# Patient Record
Sex: FEMALE | Race: WHITE | NOT HISPANIC OR LATINO | Employment: FULL TIME | URBAN - METROPOLITAN AREA
[De-identification: names, ages, dates, MRNs, and addresses within clinical notes are randomized per-mention and may not be internally consistent; named-entity substitution may affect disease eponyms.]

---

## 2018-03-12 ENCOUNTER — TRANSCRIBE ORDERS (OUTPATIENT)
Dept: ADMINISTRATIVE | Facility: HOSPITAL | Age: 53
End: 2018-03-12

## 2018-03-12 DIAGNOSIS — Z12.31 VISIT FOR SCREENING MAMMOGRAM: Primary | ICD-10-CM

## 2018-05-31 ENCOUNTER — HOSPITAL ENCOUNTER (OUTPATIENT)
Dept: MAMMOGRAPHY | Facility: HOSPITAL | Age: 53
Discharge: HOME | End: 2018-05-31
Attending: OBSTETRICS & GYNECOLOGY | Admitting: OBSTETRICS & GYNECOLOGY

## 2018-05-31 DIAGNOSIS — Z12.31 VISIT FOR SCREENING MAMMOGRAM: ICD-10-CM

## 2018-05-31 PROCEDURE — 77063 BREAST TOMOSYNTHESIS BI: CPT

## 2018-05-31 PROCEDURE — 77067 SCR MAMMO BI INCL CAD: CPT | Performed by: RADIOLOGY

## 2018-05-31 PROCEDURE — 77063 BREAST TOMOSYNTHESIS BI: CPT | Performed by: RADIOLOGY

## 2018-05-31 PROCEDURE — 77067 SCR MAMMO BI INCL CAD: CPT

## 2018-06-12 ENCOUNTER — OFFICE VISIT (OUTPATIENT)
Dept: INTERNAL MEDICINE | Facility: CLINIC | Age: 53
End: 2018-06-12

## 2018-06-12 VITALS
HEIGHT: 67 IN | TEMPERATURE: 98.4 F | OXYGEN SATURATION: 98 % | DIASTOLIC BLOOD PRESSURE: 98 MMHG | HEART RATE: 71 BPM | WEIGHT: 248 LBS | BODY MASS INDEX: 38.92 KG/M2 | SYSTOLIC BLOOD PRESSURE: 152 MMHG

## 2018-06-12 DIAGNOSIS — R03.0 BLOOD PRESSURE ELEVATED WITHOUT HISTORY OF HTN: ICD-10-CM

## 2018-06-12 DIAGNOSIS — E78.1 HIGH BLOOD TRIGLYCERIDES: ICD-10-CM

## 2018-06-12 DIAGNOSIS — Z83.71 FAMILY HISTORY OF POLYPS IN THE COLON: ICD-10-CM

## 2018-06-12 DIAGNOSIS — E55.9 VITAMIN D DEFICIENCY: ICD-10-CM

## 2018-06-12 DIAGNOSIS — Z12.11 SCREENING FOR COLON CANCER: Primary | ICD-10-CM

## 2018-06-12 PROBLEM — Z83.719 FAMILY HISTORY OF POLYPS IN THE COLON: Status: ACTIVE | Noted: 2018-06-12

## 2018-06-12 LAB
25(OH)D3 SERPL-MCNC: 17.1 NG/ML
ALBUMIN SERPL-MCNC: 4.38 G/DL (ref 3.2–4.8)
ALBUMIN/GLOB SERPL: 1.7 G/DL (ref 1.5–2.5)
ALP SERPL-CCNC: 97 U/L (ref 25–100)
ALT SERPL W P-5'-P-CCNC: 23 U/L (ref 7–40)
ANION GAP SERPL CALCULATED.3IONS-SCNC: 7 MMOL/L (ref 3–11)
ARTICHOKE IGE QN: 95 MG/DL (ref 0–130)
AST SERPL-CCNC: 18 U/L (ref 0–33)
BASOPHILS # BLD AUTO: 0.04 10*3/MM3 (ref 0–0.2)
BASOPHILS NFR BLD AUTO: 0.5 % (ref 0–1)
BILIRUB SERPL-MCNC: 0.5 MG/DL (ref 0.3–1.2)
BUN BLD-MCNC: 13 MG/DL (ref 9–23)
BUN/CREAT SERPL: 18.8 (ref 7–25)
CALCIUM SPEC-SCNC: 9.4 MG/DL (ref 8.7–10.4)
CHLORIDE SERPL-SCNC: 103 MMOL/L (ref 99–109)
CHOLEST SERPL-MCNC: 248 MG/DL (ref 0–200)
CO2 SERPL-SCNC: 29 MMOL/L (ref 20–31)
CREAT BLD-MCNC: 0.69 MG/DL (ref 0.6–1.3)
DEPRECATED RDW RBC AUTO: 40.9 FL (ref 37–54)
EOSINOPHIL # BLD AUTO: 0.19 10*3/MM3 (ref 0–0.3)
EOSINOPHIL NFR BLD AUTO: 2.3 % (ref 0–3)
ERYTHROCYTE [DISTWIDTH] IN BLOOD BY AUTOMATED COUNT: 13.5 % (ref 11.3–14.5)
GFR SERPL CREATININE-BSD FRML MDRD: 89 ML/MIN/1.73
GLOBULIN UR ELPH-MCNC: 2.5 GM/DL
GLUCOSE BLD-MCNC: 92 MG/DL (ref 70–100)
HCT VFR BLD AUTO: 43.3 % (ref 34.5–44)
HDLC SERPL-MCNC: 65 MG/DL (ref 40–60)
HGB BLD-MCNC: 14.2 G/DL (ref 11.5–15.5)
IMM GRANULOCYTES # BLD: 0.01 10*3/MM3 (ref 0–0.03)
IMM GRANULOCYTES NFR BLD: 0.1 % (ref 0–0.6)
LYMPHOCYTES # BLD AUTO: 1.85 10*3/MM3 (ref 0.6–4.8)
LYMPHOCYTES NFR BLD AUTO: 22.3 % (ref 24–44)
MCH RBC QN AUTO: 27.5 PG (ref 27–31)
MCHC RBC AUTO-ENTMCNC: 32.8 G/DL (ref 32–36)
MCV RBC AUTO: 83.9 FL (ref 80–99)
MONOCYTES # BLD AUTO: 0.43 10*3/MM3 (ref 0–1)
MONOCYTES NFR BLD AUTO: 5.2 % (ref 0–12)
NEUTROPHILS # BLD AUTO: 5.78 10*3/MM3 (ref 1.5–8.3)
NEUTROPHILS NFR BLD AUTO: 69.7 % (ref 41–71)
PLATELET # BLD AUTO: 211 10*3/MM3 (ref 150–450)
PMV BLD AUTO: 11.2 FL (ref 6–12)
POTASSIUM BLD-SCNC: 4.8 MMOL/L (ref 3.5–5.5)
PROT SERPL-MCNC: 6.9 G/DL (ref 5.7–8.2)
RBC # BLD AUTO: 5.16 10*6/MM3 (ref 3.89–5.14)
SODIUM BLD-SCNC: 139 MMOL/L (ref 132–146)
TRIGL SERPL-MCNC: 219 MG/DL (ref 0–150)
TSH SERPL DL<=0.05 MIU/L-ACNC: 1.74 MIU/ML (ref 0.35–5.35)
WBC NRBC COR # BLD: 8.29 10*3/MM3 (ref 3.5–10.8)

## 2018-06-12 PROCEDURE — 80061 LIPID PANEL: CPT | Performed by: FAMILY MEDICINE

## 2018-06-12 PROCEDURE — 80053 COMPREHEN METABOLIC PANEL: CPT | Performed by: FAMILY MEDICINE

## 2018-06-12 PROCEDURE — 85025 COMPLETE CBC W/AUTO DIFF WBC: CPT | Performed by: FAMILY MEDICINE

## 2018-06-12 PROCEDURE — 82306 VITAMIN D 25 HYDROXY: CPT | Performed by: FAMILY MEDICINE

## 2018-06-12 PROCEDURE — 99213 OFFICE O/P EST LOW 20 MIN: CPT | Performed by: FAMILY MEDICINE

## 2018-06-12 PROCEDURE — 84443 ASSAY THYROID STIM HORMONE: CPT | Performed by: FAMILY MEDICINE

## 2018-06-12 NOTE — PROGRESS NOTES
"Subjective   Tiarra Becerra is a 53 y.o. female.     Chief Complaint   Patient presents with   • Establish Care     pt states she wants to discuss heart palpitations       Visit Vitals  /98   Pulse 71   Temp 98.4 °F (36.9 °C) (Temporal Artery )   Ht 170.2 cm (67\")   Wt 112 kg (248 lb)   LMP 01/02/2018   SpO2 98%   BMI 38.84 kg/m²         Hypertension   This is a new problem. The current episode started today. The problem is unchanged. The problem is uncontrolled. Associated symptoms include headaches, malaise/fatigue and palpitations. Pertinent negatives include no anxiety, blurred vision, chest pain, neck pain, orthopnea, peripheral edema, PND, shortness of breath or sweats. There are no associated agents to hypertension. Risk factors for coronary artery disease include family history, obesity and post-menopausal state. Current antihypertension treatment includes nothing. The current treatment provides no improvement. There is no history of angina, kidney disease, CAD/MI, CVA, heart failure, left ventricular hypertrophy, PVD or retinopathy. There is no history of sleep apnea or a thyroid problem.        The following portions of the patient's history were reviewed and updated as appropriate: allergies, current medications, past family history, past medical history, past social history, past surgical history and problem list.    Past Medical History:   Diagnosis Date   • Family history of polyps in the colon 6/12/2018    father      Past Surgical History:   Procedure Laterality Date   • TUBAL ABDOMINAL LIGATION  2006      Family History   Problem Relation Age of Onset   • Breast cancer Cousin         50's   • Arthritis Mother    • Heart attack Mother    • Cancer Father    • Heart attack Father    • Hypertension Father    • Stroke Father    • Diabetes Sister         type 1   • Diabetes Brother         type 1   • Stroke Paternal Grandmother    • Ovarian cancer Neg Hx       Social History     Social History   • " Marital status:      Spouse name: N/A   • Number of children: N/A   • Years of education: N/A     Occupational History   • Not on file.     Social History Main Topics   • Smoking status: Former Smoker     Packs/day: 0.75     Years: 12.00     Types: Cigarettes     Quit date: 1994   • Smokeless tobacco: Never Used   • Alcohol use No   • Drug use: No   • Sexual activity: Defer     Other Topics Concern   • Not on file     Social History Narrative   • No narrative on file        Review of Systems   Constitutional: Positive for fatigue and malaise/fatigue. Negative for chills, diaphoresis and fever.   HENT: Positive for rhinorrhea and sinus pressure. Negative for ear pain, nosebleeds, postnasal drip, sneezing and sore throat.    Eyes: Negative.  Negative for blurred vision, redness and itching.   Respiratory: Negative.  Negative for cough, shortness of breath and wheezing.    Cardiovascular: Positive for palpitations. Negative for chest pain, orthopnea, leg swelling and PND.   Gastrointestinal: Negative.  Negative for abdominal pain, constipation, diarrhea, nausea and vomiting.   Endocrine: Negative.  Negative for cold intolerance and heat intolerance.   Genitourinary: Negative.  Negative for dysuria, frequency, hematuria and urgency.   Musculoskeletal: Positive for arthralgias (knees, left hip). Negative for back pain and neck pain.   Skin: Negative.  Negative for color change and rash.   Allergic/Immunologic: Positive for environmental allergies.   Neurological: Positive for headaches. Negative for dizziness, syncope and light-headedness.   Hematological: Negative.  Negative for adenopathy. Does not bruise/bleed easily.   Psychiatric/Behavioral: Negative.  Negative for dysphoric mood. The patient is not nervous/anxious.        Objective   Physical Exam   Constitutional: She is oriented to person, place, and time. She appears well-developed.   HENT:   Head: Normocephalic.   Right Ear: External ear normal.   Left  Ear: External ear normal.   Nose: Nose normal.   Eyes: Conjunctivae, EOM and lids are normal. Pupils are equal, round, and reactive to light.   Neck: Trachea normal and normal range of motion. Neck supple. Carotid bruit is not present. No thyroid mass and no thyromegaly present.   Cardiovascular: Normal rate and regular rhythm.    No murmur heard.  Pulmonary/Chest: Effort normal and breath sounds normal. No respiratory distress. She has no decreased breath sounds. She has no wheezes. She has no rhonchi. She has no rales. She exhibits no tenderness.   Abdominal: Soft. Bowel sounds are normal. There is no tenderness.   Musculoskeletal: Normal range of motion.   Neurological: She is alert and oriented to person, place, and time.   Skin: Skin is warm and dry.   Psychiatric: She has a normal mood and affect. Her behavior is normal.   Nursing note and vitals reviewed.      Assessment/Plan   Tiarra was seen today for establish care.    Diagnoses and all orders for this visit:    Screening for colon cancer  -     Ambulatory Referral For Screening Colonoscopy    Family history of polyps in the colon  -     Ambulatory Referral For Screening Colonoscopy    High blood triglycerides  -     Lipid Panel    Blood pressure elevated without history of HTN  -     Comprehensive Metabolic Panel  -     Lipid Panel  -     CBC & Differential  -     TSH    Vitamin D deficiency  -     Vitamin D 25 Hydroxy            Handout Dash diet           Current Outpatient Prescriptions:   •  cetirizine (ZyrTEC) 10 MG tablet, Take 10 mg by mouth daily., Disp: , Rfl:   •  Cholecalciferol (VITAMIN D-3) 1000 UNITS capsule, Take 1,000 Units by mouth daily., Disp: , Rfl:   •  estradiol (ESTRACE) 0.5 MG tablet, Take 0.5 mg by mouth daily., Disp: , Rfl:   •  progesterone (PROMETRIUM) 200 MG capsule, Take 200 mg by mouth daily., Disp: , Rfl:     Return in about 4 weeks (around 7/10/2018), or if symptoms worsen or fail to improve, for Recheck BP with nurse 1-2  weeks.

## 2018-06-12 NOTE — PATIENT INSTRUCTIONS
Exercising to Stay Healthy  Exercising regularly is important. It has many health benefits, such as:  · Improving your overall fitness, flexibility, and endurance.  · Increasing your bone density.  · Helping with weight control.  · Decreasing your body fat.  · Increasing your muscle strength.  · Reducing stress and tension.  · Improving your overall health.  In order to become healthy and stay healthy, it is recommended that you do moderate-intensity and vigorous-intensity exercise. You can tell that you are exercising at a moderate intensity if you have a higher heart rate and faster breathing, but you are still able to hold a conversation. You can tell that you are exercising at a vigorous intensity if you are breathing much harder and faster and cannot hold a conversation while exercising.  How often should I exercise?  Choose an activity that you enjoy and set realistic goals. Your health care provider can help you to make an activity plan that works for you. Exercise regularly as directed by your health care provider. This may include:  · Doing resistance training twice each week, such as:  ¨ Push-ups.  ¨ Sit-ups.  ¨ Lifting weights.  ¨ Using resistance bands.  · Doing a given intensity of exercise for a given amount of time. Choose from these options:  ¨ 150 minutes of moderate-intensity exercise every week.  ¨ 75 minutes of vigorous-intensity exercise every week.  ¨ A mix of moderate-intensity and vigorous-intensity exercise every week.  Children, pregnant women, people who are out of shape, people who are overweight, and older adults may need to consult a health care provider for individual recommendations. If you have any sort of medical condition, be sure to consult your health care provider before starting a new exercise program.  What are some exercise ideas?  Some moderate-intensity exercise ideas include:  · Walking at a rate of 1 mile in 15 minutes.  · Biking.  · Hiking.  · Golfing.  · Dancing.  Some  "vigorous-intensity exercise ideas include:  · Walking at a rate of at least 4.5 miles per hour.  · Jogging or running at a rate of 5 miles per hour.  · Biking at a rate of at least 10 miles per hour.  · Lap swimming.  · Roller-skating or in-line skating.  · Cross-country skiing.  · Vigorous competitive sports, such as football, basketball, and soccer.  · Jumping rope.  · Aerobic dancing.  What are some everyday activities that can help me to get exercise?  · Yard work, such as:  ¨ Pushing a .  ¨ Raking and bagging leaves.  · Washing and waxing your car.  · Pushing a stroller.  · Shoveling snow.  · Gardening.  · Washing windows or floors.  How can I be more active in my day-to-day activities?  · Use the stairs instead of the elevator.  · Take a walk during your lunch break.  · If you drive, park your car farther away from work or school.  · If you take public transportation, get off one stop early and walk the rest of the way.  · Make all of your phone calls while standing up and walking around.  · Get up, stretch, and walk around every 30 minutes throughout the day.  What guidelines should I follow while exercising?  · Do not exercise so much that you hurt yourself, feel dizzy, or get very short of breath.  · Consult your health care provider before starting a new exercise program.  · Wear comfortable clothes and shoes with good support.  · Drink plenty of water while you exercise to prevent dehydration or heat stroke. Body water is lost during exercise and must be replaced.  · Work out until you breathe faster and your heart beats faster.  This information is not intended to replace advice given to you by your health care provider. Make sure you discuss any questions you have with your health care provider.  Document Released: 01/20/2012 Document Revised: 05/25/2017 Document Reviewed: 05/21/2015  [employer] Interactive Patient Education © 2017 [employer]  DASH Eating Plan  DASH stands for \"Dietary " "Approaches to Stop Hypertension.\" The DASH eating plan is a healthy eating plan that has been shown to reduce high blood pressure (hypertension). It may also reduce your risk for type 2 diabetes, heart disease, and stroke. The DASH eating plan may also help with weight loss.  What are tips for following this plan?  General guidelines   · Avoid eating more than 2,300 mg (milligrams) of salt (sodium) a day. If you have hypertension, you may need to reduce your sodium intake to 1,500 mg a day.  · Limit alcohol intake to no more than 1 drink a day for nonpregnant women and 2 drinks a day for men. One drink equals 12 oz of beer, 5 oz of wine, or 1½ oz of hard liquor.  · Work with your health care provider to maintain a healthy body weight or to lose weight. Ask what an ideal weight is for you.  · Get at least 30 minutes of exercise that causes your heart to beat faster (aerobic exercise) most days of the week. Activities may include walking, swimming, or biking.  · Work with your health care provider or diet and nutrition specialist (dietitian) to adjust your eating plan to your individual calorie needs.  Reading food labels   · Check food labels for the amount of sodium per serving. Choose foods with less than 5 percent of the Daily Value of sodium. Generally, foods with less than 300 mg of sodium per serving fit into this eating plan.  · To find whole grains, look for the word \"whole\" as the first word in the ingredient list.  Shopping   · Buy products labeled as \"low-sodium\" or \"no salt added.\"  · Buy fresh foods. Avoid canned foods and premade or frozen meals.  Cooking   · Avoid adding salt when cooking. Use salt-free seasonings or herbs instead of table salt or sea salt. Check with your health care provider or pharmacist before using salt substitutes.  · Do not hazel foods. Cook foods using healthy methods such as baking, boiling, grilling, and broiling instead.  · Cook with heart-healthy oils, such as olive, canola, " soybean, or sunflower oil.  Meal planning     · Eat a balanced diet that includes:  ¨ 5 or more servings of fruits and vegetables each day. At each meal, try to fill half of your plate with fruits and vegetables.  ¨ Up to 6-8 servings of whole grains each day.  ¨ Less than 6 oz of lean meat, poultry, or fish each day. A 3-oz serving of meat is about the same size as a deck of cards. One egg equals 1 oz.  ¨ 2 servings of low-fat dairy each day.  ¨ A serving of nuts, seeds, or beans 5 times each week.  ¨ Heart-healthy fats. Healthy fats called Omega-3 fatty acids are found in foods such as flaxseeds and coldwater fish, like sardines, salmon, and mackerel.  · Limit how much you eat of the following:  ¨ Canned or prepackaged foods.  ¨ Food that is high in trans fat, such as fried foods.  ¨ Food that is high in saturated fat, such as fatty meat.  ¨ Sweets, desserts, sugary drinks, and other foods with added sugar.  ¨ Full-fat dairy products.  · Do not salt foods before eating.  · Try to eat at least 2 vegetarian meals each week.  · Eat more home-cooked food and less restaurant, buffet, and fast food.  · When eating at a restaurant, ask that your food be prepared with less salt or no salt, if possible.  What foods are recommended?  The items listed may not be a complete list. Talk with your dietitian about what dietary choices are best for you.  Grains   Whole-grain or whole-wheat bread. Whole-grain or whole-wheat pasta. Brown rice. Oatmeal. Quinoa. Bulgur. Whole-grain and low-sodium cereals. Promise bread. Low-fat, low-sodium crackers. Whole-wheat flour tortillas.  Vegetables   Fresh or frozen vegetables (raw, steamed, roasted, or grilled). Low-sodium or reduced-sodium tomato and vegetable juice. Low-sodium or reduced-sodium tomato sauce and tomato paste. Low-sodium or reduced-sodium canned vegetables.  Fruits   All fresh, dried, or frozen fruit. Canned fruit in natural juice (without added sugar).  Meat and other protein  foods   Skinless chicken or turkey. Ground chicken or turkey. Pork with fat trimmed off. Fish and seafood. Egg whites. Dried beans, peas, or lentils. Unsalted nuts, nut butters, and seeds. Unsalted canned beans. Lean cuts of beef with fat trimmed off. Low-sodium, lean deli meat.  Dairy   Low-fat (1%) or fat-free (skim) milk. Fat-free, low-fat, or reduced-fat cheeses. Nonfat, low-sodium ricotta or cottage cheese. Low-fat or nonfat yogurt. Low-fat, low-sodium cheese.  Fats and oils   Soft margarine without trans fats. Vegetable oil. Low-fat, reduced-fat, or light mayonnaise and salad dressings (reduced-sodium). Canola, safflower, olive, soybean, and sunflower oils. Avocado.  Seasoning and other foods   Herbs. Spices. Seasoning mixes without salt. Unsalted popcorn and pretzels. Fat-free sweets.  What foods are not recommended?  The items listed may not be a complete list. Talk with your dietitian about what dietary choices are best for you.  Grains   Baked goods made with fat, such as croissants, muffins, or some breads. Dry pasta or rice meal packs.  Vegetables   Creamed or fried vegetables. Vegetables in a cheese sauce. Regular canned vegetables (not low-sodium or reduced-sodium). Regular canned tomato sauce and paste (not low-sodium or reduced-sodium). Regular tomato and vegetable juice (not low-sodium or reduced-sodium). Pickles. Olives.  Fruits   Canned fruit in a light or heavy syrup. Fried fruit. Fruit in cream or butter sauce.  Meat and other protein foods   Fatty cuts of meat. Ribs. Fried meat. Solis. Sausage. Bologna and other processed lunch meats. Salami. Fatback. Hotdogs. Bratwurst. Salted nuts and seeds. Canned beans with added salt. Canned or smoked fish. Whole eggs or egg yolks. Chicken or turkey with skin.  Dairy   Whole or 2% milk, cream, and half-and-half. Whole or full-fat cream cheese. Whole-fat or sweetened yogurt. Full-fat cheese. Nondairy creamers. Whipped toppings. Processed cheese and cheese  spreads.  Fats and oils   Butter. Stick margarine. Lard. Shortening. Ghee. Solis fat. Tropical oils, such as coconut, palm kernel, or palm oil.  Seasoning and other foods   Salted popcorn and pretzels. Onion salt, garlic salt, seasoned salt, table salt, and sea salt. [facility] sauce. Tartar sauce. Barbecue sauce. Teriyaki sauce. Soy sauce, including reduced-sodium. Steak sauce. Canned and packaged gravies. Fish sauce. Oyster sauce. Cocktail sauce. Horseradish that you find on the shelf. Ketchup. Mustard. Meat flavorings and tenderizers. Bouillon cubes. Hot sauce and Tabasco sauce. Premade or packaged marinades. Premade or packaged taco seasonings. Relishes. Regular salad dressings.  Where to find more information:  · National Heart, Lung, and Blood [place]: www.nhlbi.nih.gov  · American Heart Association: www.heart.org  Summary  · The DASH eating plan is a healthy eating plan that has been shown to reduce high blood pressure (hypertension). It may also reduce your risk for type 2 diabetes, heart disease, and stroke.  · With the DASH eating plan, you should limit salt (sodium) intake to 2,300 mg a day. If you have hypertension, you may need to reduce your sodium intake to 1,500 mg a day.  · When on the DASH eating plan, aim to eat more fresh fruits and vegetables, whole grains, lean proteins, low-fat dairy, and heart-healthy fats.  · Work with your health care provider or diet and nutrition specialist (dietitian) to adjust your eating plan to your individual calorie needs.  This information is not intended to replace advice given to you by your health care provider. Make sure you discuss any questions you have with your health care provider.  Document Released: 12/06/2012 Document Revised: 12/11/2017 Document Reviewed: 12/11/2017  [employer] Interactive Patient Education © 2017 [employer]

## 2018-06-26 ENCOUNTER — CLINICAL SUPPORT (OUTPATIENT)
Dept: INTERNAL MEDICINE | Facility: CLINIC | Age: 53
End: 2018-06-26

## 2018-06-26 VITALS — SYSTOLIC BLOOD PRESSURE: 140 MMHG | DIASTOLIC BLOOD PRESSURE: 96 MMHG

## 2018-06-27 ENCOUNTER — TELEPHONE (OUTPATIENT)
Dept: INTERNAL MEDICINE | Facility: CLINIC | Age: 53
End: 2018-06-27

## 2018-06-27 RX ORDER — LISINOPRIL 10 MG/1
10 TABLET ORAL DAILY
Qty: 30 TABLET | Refills: 1 | Status: SHIPPED | OUTPATIENT
Start: 2018-06-27 | End: 2018-08-06 | Stop reason: SDUPTHER

## 2018-06-27 NOTE — TELEPHONE ENCOUNTER
----- Message from Avani Mixon MD sent at 6/27/2018  8:06 AM EDT -----  Since it has stayed high, I will send in [id number] 1 a day to start. It looks like she has a f/u in July w/ Dr Cope too  ----- Message -----  From: Rakel Khan MA  Sent: 6/26/2018   1:29 PM  To: Avani Mixon MD    Pt of Dr Cope's present today for a BP check. It was 140/96. I was unsure if I needed to send a message on it or not, but I did want to let a provider know. Thanks!!

## 2018-08-06 ENCOUNTER — OFFICE VISIT (OUTPATIENT)
Dept: INTERNAL MEDICINE | Facility: CLINIC | Age: 53
End: 2018-08-06

## 2018-08-06 VITALS
HEART RATE: 71 BPM | BODY MASS INDEX: 38.3 KG/M2 | DIASTOLIC BLOOD PRESSURE: 82 MMHG | HEIGHT: 67 IN | WEIGHT: 244 LBS | SYSTOLIC BLOOD PRESSURE: 124 MMHG | OXYGEN SATURATION: 99 % | TEMPERATURE: 98.4 F

## 2018-08-06 DIAGNOSIS — I10 ESSENTIAL HYPERTENSION: Primary | ICD-10-CM

## 2018-08-06 DIAGNOSIS — E78.1 HIGH BLOOD TRIGLYCERIDES: ICD-10-CM

## 2018-08-06 DIAGNOSIS — Z83.71 FAMILY HISTORY OF POLYPS IN THE COLON: ICD-10-CM

## 2018-08-06 DIAGNOSIS — Z12.11 SCREEN FOR COLON CANCER: ICD-10-CM

## 2018-08-06 LAB
ALBUMIN SERPL-MCNC: 4.57 G/DL (ref 3.2–4.8)
ALBUMIN/GLOB SERPL: 1.9 G/DL (ref 1.5–2.5)
ALP SERPL-CCNC: 96 U/L (ref 25–100)
ALT SERPL W P-5'-P-CCNC: 29 U/L (ref 7–40)
ANION GAP SERPL CALCULATED.3IONS-SCNC: 8 MMOL/L (ref 3–11)
ARTICHOKE IGE QN: 72 MG/DL (ref 0–130)
AST SERPL-CCNC: 20 U/L (ref 0–33)
BILIRUB SERPL-MCNC: 0.5 MG/DL (ref 0.3–1.2)
BUN BLD-MCNC: 12 MG/DL (ref 9–23)
BUN/CREAT SERPL: 15.8 (ref 7–25)
CALCIUM SPEC-SCNC: 9.5 MG/DL (ref 8.7–10.4)
CHLORIDE SERPL-SCNC: 101 MMOL/L (ref 99–109)
CHOLEST SERPL-MCNC: 195 MG/DL (ref 0–200)
CO2 SERPL-SCNC: 31 MMOL/L (ref 20–31)
CREAT BLD-MCNC: 0.76 MG/DL (ref 0.6–1.3)
GFR SERPL CREATININE-BSD FRML MDRD: 79 ML/MIN/1.73
GLOBULIN UR ELPH-MCNC: 2.4 GM/DL
GLUCOSE BLD-MCNC: 91 MG/DL (ref 70–100)
HDLC SERPL-MCNC: 68 MG/DL (ref 40–60)
POTASSIUM BLD-SCNC: 4.2 MMOL/L (ref 3.5–5.5)
PROT SERPL-MCNC: 7 G/DL (ref 5.7–8.2)
SODIUM BLD-SCNC: 140 MMOL/L (ref 132–146)
TRIGL SERPL-MCNC: 231 MG/DL (ref 0–150)

## 2018-08-06 PROCEDURE — 80061 LIPID PANEL: CPT | Performed by: FAMILY MEDICINE

## 2018-08-06 PROCEDURE — 99214 OFFICE O/P EST MOD 30 MIN: CPT | Performed by: FAMILY MEDICINE

## 2018-08-06 PROCEDURE — 80053 COMPREHEN METABOLIC PANEL: CPT | Performed by: FAMILY MEDICINE

## 2018-08-06 RX ORDER — LISINOPRIL 10 MG/1
10 TABLET ORAL DAILY
Qty: 30 TABLET | Refills: 2 | Status: SHIPPED | OUTPATIENT
Start: 2018-08-06 | End: 2018-11-27

## 2018-08-06 NOTE — PROGRESS NOTES
"Subjective   Tiarra Becerra is a 53 y.o. female.     Chief Complaint   Patient presents with   • 4 week follow up   • Med Refill       Visit Vitals  /82   Pulse 71   Temp 98.4 °F (36.9 °C) (Temporal Artery )   Ht 170.2 cm (67\")   Wt 111 kg (244 lb)   LMP 01/02/2018   SpO2 99%   BMI 38.22 kg/m²         Hypertension   This is a chronic problem. The current episode started more than 1 month ago. The problem is unchanged. The problem is controlled. Pertinent negatives include no anxiety, blurred vision, chest pain, headaches, malaise/fatigue, neck pain, orthopnea, palpitations, peripheral edema, PND, shortness of breath or sweats. There are no associated agents to hypertension. Risk factors for coronary artery disease include dyslipidemia, post-menopausal state and obesity. Current antihypertension treatment includes ACE inhibitors. The current treatment provides significant improvement. There are no compliance problems.  There is no history of angina, kidney disease, CAD/MI, CVA, heart failure, left ventricular hypertrophy, PVD or retinopathy. There is no history of chronic renal disease, sleep apnea or a thyroid problem.   Hyperlipidemia   This is a chronic problem. The current episode started more than 1 month ago. Condition status: pended. Exacerbating diseases include obesity. She has no history of chronic renal disease, diabetes, hypothyroidism, liver disease or nephrotic syndrome. There are no known factors aggravating her hyperlipidemia. Pertinent negatives include no chest pain, focal sensory loss, focal weakness, leg pain or shortness of breath. Current antihyperlipidemic treatment includes diet change. Improvement on treatment: pending. There are no compliance problems.  Risk factors for coronary artery disease include dyslipidemia, hypertension, family history, obesity and post-menopausal.        The following portions of the patient's history were reviewed and updated as appropriate: allergies, current " medications, past family history, past medical history, past social history, past surgical history and problem list.    Past Medical History:   Diagnosis Date   • Essential hypertension 8/6/2018   • Family history of polyps in the colon 6/12/2018    father      Past Surgical History:   Procedure Laterality Date   • TUBAL ABDOMINAL LIGATION  2006      Family History   Problem Relation Age of Onset   • Breast cancer Cousin         50's   • Arthritis Mother    • Heart attack Mother    • Cancer Father    • Heart attack Father    • Hypertension Father    • Stroke Father    • Diabetes Sister         type 1   • Diabetes Brother         type 1   • Stroke Paternal Grandmother    • Ovarian cancer Neg Hx       Social History     Social History   • Marital status:      Spouse name: N/A   • Number of children: N/A   • Years of education: N/A     Occupational History   • Not on file.     Social History Main Topics   • Smoking status: Former Smoker     Packs/day: 0.75     Years: 12.00     Types: Cigarettes     Quit date: 1994   • Smokeless tobacco: Never Used   • Alcohol use No   • Drug use: No   • Sexual activity: Defer     Other Topics Concern   • Not on file     Social History Narrative   • No narrative on file        Review of Systems   Constitutional: Negative.  Negative for chills, diaphoresis, fatigue, fever and malaise/fatigue.   HENT: Negative.  Negative for ear pain, nosebleeds, postnasal drip, rhinorrhea, sinus pressure, sneezing and sore throat.    Eyes: Negative.  Negative for blurred vision, redness and itching.   Respiratory: Negative.  Negative for cough, shortness of breath and wheezing.    Cardiovascular: Negative.  Negative for chest pain, palpitations, orthopnea and PND.   Gastrointestinal: Negative.  Negative for abdominal pain, constipation, diarrhea, nausea and vomiting.   Endocrine: Negative.  Negative for cold intolerance and heat intolerance.   Genitourinary: Negative.  Negative for dysuria,  frequency, hematuria and urgency.   Musculoskeletal: Negative.  Negative for arthralgias, back pain and neck pain.   Skin: Negative.  Negative for color change and rash.   Allergic/Immunologic: Negative.  Negative for environmental allergies.   Neurological: Negative.  Negative for dizziness, focal weakness, syncope, weakness, light-headedness and headaches.   Hematological: Negative.  Negative for adenopathy. Does not bruise/bleed easily.   Psychiatric/Behavioral: Negative.  Negative for dysphoric mood. The patient is not nervous/anxious.        Objective   Physical Exam   Constitutional: She is oriented to person, place, and time. She appears well-developed.   HENT:   Head: Normocephalic.   Right Ear: External ear normal.   Left Ear: External ear normal.   Nose: Nose normal.   Eyes: Pupils are equal, round, and reactive to light. Conjunctivae, EOM and lids are normal.   Neck: Trachea normal and normal range of motion. Neck supple. Carotid bruit is not present. No thyroid mass and no thyromegaly present.   Cardiovascular: Normal rate and regular rhythm.    No murmur heard.  Pulmonary/Chest: Effort normal and breath sounds normal. No respiratory distress. She has no decreased breath sounds. She has no wheezes. She has no rhonchi. She has no rales. She exhibits no tenderness.   Abdominal: Soft. Bowel sounds are normal. There is no tenderness.   Musculoskeletal: Normal range of motion.   Neurological: She is alert and oriented to person, place, and time.   Skin: Skin is warm and dry.   Psychiatric: She has a normal mood and affect. Her behavior is normal.   Nursing note and vitals reviewed.      Assessment/Plan   Tiarra was seen today for 4 week follow up and med refill.    Diagnoses and all orders for this visit:    Essential hypertension  -     lisinopril (PRINIVIL,ZESTRIL) 10 MG tablet; Take 1 tablet by mouth Daily.  -     Comprehensive Metabolic Panel  -     Lipid Panel    High blood triglycerides  -      Comprehensive Metabolic Panel  -     Lipid Panel    Family history of polyps in the colon  -     Ambulatory Referral For Screening Colonoscopy    Screen for colon cancer  -     Ambulatory Referral For Screening Colonoscopy        Discussed Shingrix vaccine with pt,  that is currently available at the pharmacy.              Current Outpatient Prescriptions:   •  cetirizine (ZyrTEC) 10 MG tablet, Take 10 mg by mouth daily., Disp: , Rfl:   •  Cholecalciferol (VITAMIN D-3) 1000 UNITS capsule, Take 1,000 Units by mouth daily., Disp: , Rfl:   •  estradiol (ESTRACE) 0.5 MG tablet, Take 0.5 mg by mouth daily., Disp: , Rfl:   •  lisinopril (PRINIVIL,ZESTRIL) 10 MG tablet, Take 1 tablet by mouth Daily., Disp: 30 tablet, Rfl: 2  •  progesterone (PROMETRIUM) 200 MG capsule, Take 200 mg by mouth daily., Disp: , Rfl:     Return in about 3 months (around 11/6/2018), or if symptoms worsen or fail to improve, for Recheck, Annual.

## 2018-08-08 ENCOUNTER — TELEPHONE (OUTPATIENT)
Dept: INTERNAL MEDICINE | Facility: CLINIC | Age: 53
End: 2018-08-08

## 2018-08-08 NOTE — TELEPHONE ENCOUNTER
Pt notified of results. States she is not following diet or taking fish oil. PT want to try following low fat, low sugar, low alcohol diet and add OTC fish oil before adding additional medication.

## 2018-08-08 NOTE — TELEPHONE ENCOUNTER
----- Message -----  From: Fallon Cope MD  Sent: 8/6/2018  11:20 PM  To: Rebecca Rossi    Please call the patient regarding her abnormal result. Triglycerides staying high, if she is following diet(low fat, low sugar, low alcohol)  and taking fish oil we can add additional medication

## 2018-11-02 ENCOUNTER — APPOINTMENT (OUTPATIENT)
Dept: PREADMISSION TESTING | Facility: HOSPITAL | Age: 53
End: 2018-11-02

## 2018-11-02 LAB
B-HCG UR QL: NEGATIVE
BACTERIA UR QL AUTO: ABNORMAL /HPF
BILIRUB UR QL STRIP: NEGATIVE
CLARITY UR: CLEAR
COLOR UR: YELLOW
DEPRECATED RDW RBC AUTO: 39.6 FL (ref 37–54)
ERYTHROCYTE [DISTWIDTH] IN BLOOD BY AUTOMATED COUNT: 13.2 % (ref 11.3–14.5)
GLUCOSE UR STRIP-MCNC: NEGATIVE MG/DL
HCT VFR BLD AUTO: 39 % (ref 34.5–44)
HGB BLD-MCNC: 12.6 G/DL (ref 11.5–15.5)
HGB UR QL STRIP.AUTO: ABNORMAL
HYALINE CASTS UR QL AUTO: ABNORMAL /LPF
KETONES UR QL STRIP: NEGATIVE
LEUKOCYTE ESTERASE UR QL STRIP.AUTO: NEGATIVE
MCH RBC QN AUTO: 27 PG (ref 27–31)
MCHC RBC AUTO-ENTMCNC: 32.3 G/DL (ref 32–36)
MCV RBC AUTO: 83.5 FL (ref 80–99)
NITRITE UR QL STRIP: NEGATIVE
PH UR STRIP.AUTO: <=5 [PH] (ref 5–8)
PLATELET # BLD AUTO: 247 10*3/MM3 (ref 150–450)
PMV BLD AUTO: 10.6 FL (ref 6–12)
POTASSIUM BLD-SCNC: 3.7 MMOL/L (ref 3.5–5.5)
PROT UR QL STRIP: NEGATIVE
RBC # BLD AUTO: 4.67 10*6/MM3 (ref 3.89–5.14)
RBC # UR: ABNORMAL /HPF
REF LAB TEST METHOD: ABNORMAL
SP GR UR STRIP: 1.01 (ref 1–1.03)
SQUAMOUS #/AREA URNS HPF: ABNORMAL /HPF
T4 SERPL-MCNC: 8.4 MCG/DL (ref 4.7–11.4)
TSH SERPL DL<=0.05 MIU/L-ACNC: 1.99 MIU/ML (ref 0.35–5.35)
UROBILINOGEN UR QL STRIP: ABNORMAL
WBC NRBC COR # BLD: 8.14 10*3/MM3 (ref 3.5–10.8)
WBC UR QL AUTO: ABNORMAL /HPF

## 2018-11-02 PROCEDURE — 36415 COLL VENOUS BLD VENIPUNCTURE: CPT

## 2018-11-02 PROCEDURE — 84436 ASSAY OF TOTAL THYROXINE: CPT | Performed by: OBSTETRICS & GYNECOLOGY

## 2018-11-02 PROCEDURE — 84443 ASSAY THYROID STIM HORMONE: CPT | Performed by: OBSTETRICS & GYNECOLOGY

## 2018-11-02 PROCEDURE — 81025 URINE PREGNANCY TEST: CPT | Performed by: OBSTETRICS & GYNECOLOGY

## 2018-11-02 PROCEDURE — 85027 COMPLETE CBC AUTOMATED: CPT | Performed by: OBSTETRICS & GYNECOLOGY

## 2018-11-02 PROCEDURE — 93005 ELECTROCARDIOGRAM TRACING: CPT

## 2018-11-02 PROCEDURE — 81001 URINALYSIS AUTO W/SCOPE: CPT | Performed by: OBSTETRICS & GYNECOLOGY

## 2018-11-02 PROCEDURE — 84132 ASSAY OF SERUM POTASSIUM: CPT | Performed by: OBSTETRICS & GYNECOLOGY

## 2018-11-02 PROCEDURE — 93010 ELECTROCARDIOGRAM REPORT: CPT | Performed by: INTERNAL MEDICINE

## 2018-11-05 ENCOUNTER — LAB REQUISITION (OUTPATIENT)
Dept: LAB | Facility: HOSPITAL | Age: 53
End: 2018-11-05

## 2018-11-05 DIAGNOSIS — N92.0 EXCESSIVE AND FREQUENT MENSTRUATION WITH REGULAR CYCLE: ICD-10-CM

## 2018-11-05 PROCEDURE — 88305 TISSUE EXAM BY PATHOLOGIST: CPT | Performed by: OBSTETRICS & GYNECOLOGY

## 2018-11-07 LAB
CYTO UR: NORMAL
LAB AP CASE REPORT: NORMAL
LAB AP CLINICAL INFORMATION: NORMAL
LAB AP DIAGNOSIS COMMENT: NORMAL
PATH REPORT.FINAL DX SPEC: NORMAL
PATH REPORT.GROSS SPEC: NORMAL

## 2018-11-27 ENCOUNTER — TELEPHONE (OUTPATIENT)
Dept: INTERNAL MEDICINE | Facility: CLINIC | Age: 53
End: 2018-11-27

## 2018-11-27 DIAGNOSIS — I10 ESSENTIAL HYPERTENSION: ICD-10-CM

## 2018-11-27 RX ORDER — LISINOPRIL 10 MG/1
10 TABLET ORAL DAILY
Qty: 14 TABLET | Refills: 0 | Status: SHIPPED | OUTPATIENT
Start: 2018-11-27 | End: 2018-12-07

## 2018-11-28 ENCOUNTER — HOSPITAL ENCOUNTER (OUTPATIENT)
Dept: GENERAL RADIOLOGY | Facility: HOSPITAL | Age: 53
Discharge: HOME | End: 2018-11-28
Admitting: OBSTETRICS & GYNECOLOGY

## 2018-11-28 ENCOUNTER — OFFICE VISIT (OUTPATIENT)
Dept: GYNECOLOGIC ONCOLOGY | Facility: CLINIC | Age: 53
End: 2018-11-28

## 2018-11-28 ENCOUNTER — PREP FOR SURGERY (OUTPATIENT)
Dept: GYNECOLOGIC ONCOLOGY | Facility: CLINIC | Age: 53
End: 2018-11-28

## 2018-11-28 ENCOUNTER — APPOINTMENT (OUTPATIENT)
Dept: PREADMISSION TESTING | Facility: HOSPITAL | Age: 53
End: 2018-11-28

## 2018-11-28 ENCOUNTER — PATIENT EDUCATION (SURGERY INSTRUCTIONS) (OUTPATIENT)
Dept: GYNECOLOGIC ONCOLOGY | Facility: CLINIC | Age: 53
End: 2018-11-28

## 2018-11-28 VITALS
SYSTOLIC BLOOD PRESSURE: 149 MMHG | HEART RATE: 73 BPM | OXYGEN SATURATION: 97 % | TEMPERATURE: 98.1 F | RESPIRATION RATE: 18 BRPM | WEIGHT: 243 LBS | HEIGHT: 67 IN | BODY MASS INDEX: 38.14 KG/M2 | DIASTOLIC BLOOD PRESSURE: 84 MMHG

## 2018-11-28 VITALS — WEIGHT: 244.05 LBS | BODY MASS INDEX: 38.3 KG/M2 | HEIGHT: 67 IN

## 2018-11-28 DIAGNOSIS — C54.1 ENDOMETRIAL CANCER (HCC): ICD-10-CM

## 2018-11-28 DIAGNOSIS — C54.1 ENDOMETRIAL CANCER (HCC): Primary | ICD-10-CM

## 2018-11-28 LAB
ABO GROUP BLD: NORMAL
ALBUMIN SERPL-MCNC: 4.62 G/DL (ref 3.2–4.8)
ALBUMIN/GLOB SERPL: 2 G/DL (ref 1.5–2.5)
ALP SERPL-CCNC: 99 U/L (ref 25–100)
ALT SERPL W P-5'-P-CCNC: 25 U/L (ref 7–40)
ANION GAP SERPL CALCULATED.3IONS-SCNC: 1 MMOL/L (ref 3–11)
AST SERPL-CCNC: 21 U/L (ref 0–33)
BASOPHILS # BLD AUTO: 0.03 10*3/MM3 (ref 0–0.2)
BASOPHILS NFR BLD AUTO: 0.4 % (ref 0–1)
BILIRUB SERPL-MCNC: 0.4 MG/DL (ref 0.3–1.2)
BLD GP AB SCN SERPL QL: NEGATIVE
BUN BLD-MCNC: 13 MG/DL (ref 9–23)
BUN/CREAT SERPL: 18.8 (ref 7–25)
CALCIUM SPEC-SCNC: 9.4 MG/DL (ref 8.7–10.4)
CHLORIDE SERPL-SCNC: 107 MMOL/L (ref 99–109)
CO2 SERPL-SCNC: 30 MMOL/L (ref 20–31)
CREAT BLD-MCNC: 0.69 MG/DL (ref 0.6–1.3)
DEPRECATED RDW RBC AUTO: 38.6 FL (ref 37–54)
EOSINOPHIL # BLD AUTO: 0.27 10*3/MM3 (ref 0–0.3)
EOSINOPHIL NFR BLD AUTO: 3.6 % (ref 0–3)
ERYTHROCYTE [DISTWIDTH] IN BLOOD BY AUTOMATED COUNT: 12.8 % (ref 11.3–14.5)
GFR SERPL CREATININE-BSD FRML MDRD: 89 ML/MIN/1.73
GLOBULIN UR ELPH-MCNC: 2.3 GM/DL
GLUCOSE BLD-MCNC: 93 MG/DL (ref 70–100)
HCT VFR BLD AUTO: 39.7 % (ref 34.5–44)
HGB BLD-MCNC: 12.5 G/DL (ref 11.5–15.5)
IMM GRANULOCYTES # BLD: 0.01 10*3/MM3 (ref 0–0.03)
IMM GRANULOCYTES NFR BLD: 0.1 % (ref 0–0.6)
LYMPHOCYTES # BLD AUTO: 1.58 10*3/MM3 (ref 0.6–4.8)
LYMPHOCYTES NFR BLD AUTO: 20.9 % (ref 24–44)
MCH RBC QN AUTO: 25.9 PG (ref 27–31)
MCHC RBC AUTO-ENTMCNC: 31.5 G/DL (ref 32–36)
MCV RBC AUTO: 82.4 FL (ref 80–99)
MONOCYTES # BLD AUTO: 0.28 10*3/MM3 (ref 0–1)
MONOCYTES NFR BLD AUTO: 3.7 % (ref 0–12)
NEUTROPHILS # BLD AUTO: 5.41 10*3/MM3 (ref 1.5–8.3)
NEUTROPHILS NFR BLD AUTO: 71.4 % (ref 41–71)
PLATELET # BLD AUTO: 265 10*3/MM3 (ref 150–450)
PMV BLD AUTO: 10 FL (ref 6–12)
POTASSIUM BLD-SCNC: 4.5 MMOL/L (ref 3.5–5.5)
PROT SERPL-MCNC: 6.9 G/DL (ref 5.7–8.2)
RBC # BLD AUTO: 4.82 10*6/MM3 (ref 3.89–5.14)
RH BLD: NEGATIVE
SODIUM BLD-SCNC: 138 MMOL/L (ref 132–146)
T&S EXPIRATION DATE: NORMAL
WBC NRBC COR # BLD: 7.57 10*3/MM3 (ref 3.5–10.8)

## 2018-11-28 PROCEDURE — 86901 BLOOD TYPING SEROLOGIC RH(D): CPT | Performed by: OBSTETRICS & GYNECOLOGY

## 2018-11-28 PROCEDURE — 85025 COMPLETE CBC W/AUTO DIFF WBC: CPT | Performed by: OBSTETRICS & GYNECOLOGY

## 2018-11-28 PROCEDURE — 80053 COMPREHEN METABOLIC PANEL: CPT | Performed by: OBSTETRICS & GYNECOLOGY

## 2018-11-28 PROCEDURE — 36415 COLL VENOUS BLD VENIPUNCTURE: CPT

## 2018-11-28 PROCEDURE — 86900 BLOOD TYPING SEROLOGIC ABO: CPT | Performed by: OBSTETRICS & GYNECOLOGY

## 2018-11-28 PROCEDURE — 71046 X-RAY EXAM CHEST 2 VIEWS: CPT

## 2018-11-28 PROCEDURE — 86850 RBC ANTIBODY SCREEN: CPT | Performed by: OBSTETRICS & GYNECOLOGY

## 2018-11-28 PROCEDURE — 99205 OFFICE O/P NEW HI 60 MIN: CPT | Performed by: OBSTETRICS & GYNECOLOGY

## 2018-11-28 RX ORDER — CELECOXIB 100 MG/1
200 CAPSULE ORAL ONCE
Status: CANCELLED | OUTPATIENT
Start: 2018-11-28

## 2018-11-28 RX ORDER — PREGABALIN 25 MG/1
150 CAPSULE ORAL ONCE
Status: CANCELLED | OUTPATIENT
Start: 2018-11-28

## 2018-11-28 RX ORDER — SODIUM CHLORIDE, SODIUM LACTATE, POTASSIUM CHLORIDE, CALCIUM CHLORIDE 600; 310; 30; 20 MG/100ML; MG/100ML; MG/100ML; MG/100ML
100 INJECTION, SOLUTION INTRAVENOUS CONTINUOUS
Status: CANCELLED | OUTPATIENT
Start: 2018-11-28

## 2018-11-28 RX ORDER — ACETAMINOPHEN 325 MG/1
650 TABLET ORAL ONCE
Status: CANCELLED | OUTPATIENT
Start: 2018-11-28

## 2018-11-28 NOTE — PATIENT INSTRUCTIONS
Gynecologic Oncology  Inpatient Pre-op Patient Education  *See checked boxes for your instructions*    Patient Name:  Tiarra Becerra  [id number]  1965    Surgeon:  Dr. Jaffe    Appointment  [x]  1. Your surgery has been scheduled on 11-30-18. You will need to be at the second floor surgery registration of the [facility] hospital on that day at 6:00 am.  Enter the campus grounds through entrance #2, park in [facility], walk up the hill into the hospital and follow that freedman until you see elevators on right, use that elevator to go to the 2nd floor, when the door opens, you will see an arrow to direct you to registration.     [x] 2.  You have a pre-admission testing (PAT) appointment for labs and possibly chest xray and EKG, on 11-28-18 at .  You will need to be at hospital registration on the first floor, 10 minutes before that time.     [x] 3.  The hospital registration department is located in the long hallway between the [facility] and [address] buildings.     The Day(s) Before Surgery  [x] 1. On 11-29-18, the day prior to surgery, eat lightly.  No solid food after midnight on 11-29-18, including NO MILK, CREAM, OR ORANGE JUICE.  You may have sips of clear fluids up until two-three hours prior to your arrival to the hospital on the morning of surgery.     [] 2.  You may need to use a stool softener, the day prior to surgery to help with existing constipation and to clean out your bowels.  You can purchase Miralax over-the-counter at the pharmacy and follow the directions on the back.  (Do not do this step unless the box is checked).   [x] 3.  Do not take vitamins or full dose aspirin one week before surgery.  If you normally take a blood thinning medication such as Warfarin, Eliquis, or Xarelto, we will give you specific instructions regarding these medications and we may need to talk with your other doctors.   [x] 4.  On the morning of your surgery, you may likely take your routine prescription medications with a sip of  water as reviewed with you by your surgeon.  Bring your home medications with you to the hospital as we may need to reference these.  In particular be sure to bring any inhalers.     Post-surgery Instructions  [x] 1.  The length of stay for your type of surgery is typically one hospital night, however it is also possible that you could be discharged home in the evening on the same day depending on the nature of your surgery.  All rooms are private, so family member may stay with you.     [x] 2.  Do not take your own home prescription medication while you are in the hospital unless otherwise instructed.  These will be provided to you.         Comments:  Please remove all finger nail polish.

## 2018-11-28 NOTE — PROGRESS NOTES
Tiarra Becerra  [id number]  1965      Reason for visit: newly diagnosed endometrial cancer, h/o hypertension, obesity    Consultation:  Patient is being seen at the request of Dr. Ammy Davis     History of present illness:  The patient is a 53 y.o. year old female who presents today for treatment and evaluation of the above issues.    52yo  who had been amenorrheic for 8 months and began to have vaginal bleeding in 2018. She had her annual well-woman examination 10/2018. TVUS was performed and showed a large polyp or fibroid in the endocercial canal measruing 3.8 x 3.4 x 3.8 cm. She underwent an operative hysteroscopy with Myosure. Pathology results were significant for FIGO Grade 1, endometrioid adenocarcinoma arising in surrounding complex atypical hyperplasia, with areas of definite myoinnvasion. She no longer has any vaginal bleeding.     Her hypertension is being addressed by Dr. Fallon Cope with Lisinopril. Her BP is 149/89 today.     Her BMI is 38.2 kg/m2. She does not have healthy diet/exercise habits, and lives a relatively sedentary lifestyle.     For new patients, PFSH intake form from 18 was reviewed and confirmed today.    OBGYN History:  She is a .  She did use HRT (Estrace/Progesterone), but this has been discontinued. She does have a history of abnormal pap smears.      Oncologic History:   No history exists.         Past Medical History:   Diagnosis Date   • Endometrial cancer (CMS/HCC) 2018   • Essential hypertension 2018   • Family history of polyps in the colon 2018    father       Past Surgical History:   Procedure Laterality Date   • D&C HYSTEROSCOPY MYOSURE  2018   • TUBAL ABDOMINAL LIGATION         MEDICATIONS: The current medication list was reviewed with the patient and updated in the EMR this date per the Medical Assistant. Medication dosages and frequencies were confirmed to be accurate.      Allergies:  has No Known  Allergies.    Social History:   Social History     Socioeconomic History   • Marital status:      Spouse name: Not on file   • Number of children: Not on file   • Years of education: Not on file   • Highest education level: Not on file   Social Needs   • Financial resource strain: Not on file   • Food insecurity - worry: Not on file   • Food insecurity - inability: Not on file   • Transportation needs - medical: Not on file   • Transportation needs - non-medical: Not on file   Occupational History   • Not on file   Tobacco Use   • Smoking status: Former Smoker     Packs/day: 0.75     Years: 12.00     Pack years: 9.00     Types: Cigarettes     Last attempt to quit:      Years since quittin.9   • Smokeless tobacco: Never Used   Substance and Sexual Activity   • Alcohol use: No   • Drug use: No   • Sexual activity: Yes     Partners: Male   Other Topics Concern   • Not on file   Social History Narrative   • Not on file       Family History:    Family History   Problem Relation Age of Onset   • Breast cancer Cousin 55        50's   • Arthritis Mother    • Heart attack Mother    • Cancer Father         prostate   • Heart attack Father    • Hypertension Father    • Stroke Father    • Diabetes Sister         type 1   • Diabetes Brother         type 1   • Stroke Paternal Grandmother    • Ovarian cancer Neg Hx        Health Maintenance:    Health Maintenance   Topic Date Due   • COLONOSCOPY  2016   • ANNUAL PHYSICAL  2017   • PAP SMEAR  2018   • ZOSTER VACCINE (2 of 2) 2018   • LIPID PANEL  2019   • MAMMOGRAM  2020   • TDAP/TD VACCINES (2 - Td) 2021   • HEPATITIS C SCREENING  Completed   • INFLUENZA VACCINE  Completed         Review of Systems   Constitutional: Positive for fatigue. Negative for activity change, appetite change, chills, diaphoresis, fever and unexpected weight change.   HENT: Negative for congestion, ear pain, hearing loss and sore throat.    Eyes:  "Negative for photophobia, discharge, redness, itching and visual disturbance.   Respiratory: Negative for apnea, cough, shortness of breath and wheezing.    Cardiovascular: Negative for chest pain, palpitations and leg swelling.   Gastrointestinal: Positive for abdominal pain (heartburn) and diarrhea. Negative for abdominal distention, blood in stool, constipation, nausea and vomiting.   Endocrine: Negative for cold intolerance and heat intolerance.   Genitourinary: Positive for difficulty urinating (incontinence, nocturia). Negative for dyspareunia, dysuria, frequency, genital sores, hematuria, pelvic pain, urgency, vaginal bleeding, vaginal discharge and vaginal pain.   Musculoskeletal: Negative for arthralgias, back pain, gait problem and joint swelling.   Skin: Negative for rash and wound.   Allergic/Immunologic: Negative for food allergies and immunocompromised state.   Neurological: Negative for dizziness, seizures, syncope, weakness, light-headedness, numbness and headaches.   Hematological: Negative for adenopathy. Does not bruise/bleed easily.   Psychiatric/Behavioral: Negative for confusion, dysphoric mood and sleep disturbance. The patient is not nervous/anxious.          Vitals:    11/28/18 0940   BP: 149/84   Pulse: 73   Resp: 18   Temp: 98.1 °F (36.7 °C)   TempSrc: Temporal   SpO2: 97%   Weight: 110 kg (243 lb)   Height: 170.2 cm (67\")     Body mass index is 38.06 kg/m².    Wt Readings from Last 3 Encounters:   11/28/18 110 kg (243 lb)   08/06/18 111 kg (244 lb)   06/12/18 112 kg (248 lb)       GENERAL: Alert, well-appearing female appearing her stated age who is in no apparent distress.   HEENT: Sclera anicteric. Head normocephalic, atraumatic. Mucus membranes moist.   NECK: Trachea midline, supple, without masses.  No thyromegaly.   BREASTS: Deferred  CARDIOVASCULAR: Normal rate, regular rhythm, no murmurs, rubs, or gallops.  No peripheral edema.  RESPIRATORY: Clear to auscultation bilaterally, " normal respiratory effort  BACK:  No CVA tenderness, no vertebral tenderness on palpation  GASTROINTESTINAL:  Abdomen is soft, non-tender, non-distended, no rebound or guarding, no masses, or hernias. No HSM.  Obese abdomen.  SKIN:  Warm, dry, well-perfused.  All visible areas intact.  No rashes, lesions, ulcers.  PSYCHIATRIC: AO x3, with appropriate affect, normal thought processes.  NEUROLOGIC: No focal deficits.  Moves extremities well.  MUSCULOSKELETAL: Normal gait and station.   EXTREMITIES:   No cyanosis, clubbing, symmetric.  LYMPHATICS:  No cervical or inguinal adenopathy noted.     PELVIC exam:    Vagina and vulva are normal;  no discharge is noted.  Cervix normal without lesions. Uterus anteverted and mobile, normal in size and shape without tenderness.  Adnexa normal in size without masses or tenderness. Pap Smear - is up to date. Exam chaperoned by female assistant.    ECOG PS 0    PROCEDURES:  none    Diagnostic Data:      Xr Chest 2 View    Result Date: 11/28/2018  No acute cardiopulmonary process.  D:  11/28/2018 E:  11/28/2018  This report was finalized on 11/28/2018 2:08 PM by Dr. Shamir Hopson.        Lab Results   Component Value Date    WBC 7.57 11/28/2018    HGB 12.5 11/28/2018    HCT 39.7 11/28/2018    MCV 82.4 11/28/2018     11/28/2018    NEUTROABS 5.41 11/28/2018    GLUCOSE 93 11/28/2018    BUN 13 11/28/2018    CREATININE 0.69 11/28/2018    EGFRIFNONA 89 11/28/2018     11/28/2018    K 4.5 11/28/2018     11/28/2018    CO2 30.0 11/28/2018    CALCIUM 9.4 11/28/2018    ALBUMIN 4.62 11/28/2018    AST 21 11/28/2018    ALT 25 11/28/2018    BILITOT 0.4 11/28/2018     No results found for:       Assessment/Plan   This is a 53 y.o. woman with newly diagnosed FIGO Gr 1, endometrioid adenocarcinoma of the uterus; HTN and obesity.     1. Endometrial cancer  - FIGO Grade 1, endometrioid, with areas of myoinvasion  - Patient was consented for robotic assisted total laparoscopic  hysterectomy, bilateral salpingo-oophorectomy, possible lymph node dissection.    Risks and benefits of surgery were discussed.  This included, but was not limited to, infection and bleeding like when the skin is cut; damage to surrounding structures; and incisional complications.  Risk of DVT was addressed for major surgeries.  Standard of care efforts to minimize these risks were reviewed.  Typical hospital stay and recovery were discussed as well as post-procedure precautions.  Surgical implications of chronic illnesses on recovery and surgical outcome were reviewed. Risks and benefits of lymph node dissection were further discussed.  This included lymphocyst, hematoma, lymphedema, vascular injury, and nerve injury.  Patient verbalized understanding of the plan including the risks and benefits.  Appropriate perioperative testing including laboratory evaluation, EKG as clinically indicated, chest x-ray as clinically indicated, and preadmission evaluation were all ordered as a part of this patient's care.  2. HTN  - continues enalapril  3. Obesity  - BMI = 38  - complicates all aspects of care  - discussed cancer-related cancers, and obesity related health effects. Will continue to  on healthy lifestyle changes.     No orders of the defined types were placed in this encounter.    FOLLOW UP: surgery    Note: Speech recognition transcription software was used to dictate portions of this document.  An attempt at proofreading has been made though minor errors in transcription may still be present.  Please do not hesitate to call our office with any questions.    Electronically Signed by: Cheng Hu MD  Date: 11/28/2018         I personally spent 60 minutes face-to-face with the patient of which >50% was spent performing counseling/coordiantion of care.    Patient was seen and examined with Dr. Hu,  resident, who performed portions of the examination and documentation for this patient's care  under my direct supervision.  I agree with the above documentation and plan.    April Jaffe MD  11/28/18  5:14 PM

## 2018-11-29 ENCOUNTER — ANESTHESIA EVENT (OUTPATIENT)
Dept: PERIOP | Facility: HOSPITAL | Age: 53
End: 2018-11-29

## 2018-11-29 RX ORDER — FAMOTIDINE 10 MG/ML
20 INJECTION, SOLUTION INTRAVENOUS ONCE
Status: CANCELLED | OUTPATIENT
Start: 2018-11-29 | End: 2018-11-29

## 2018-11-29 RX ORDER — SODIUM CHLORIDE 0.9 % (FLUSH) 0.9 %
3-10 SYRINGE (ML) INJECTION AS NEEDED
Status: CANCELLED | OUTPATIENT
Start: 2018-11-29

## 2018-11-29 RX ORDER — SODIUM CHLORIDE 0.9 % (FLUSH) 0.9 %
3 SYRINGE (ML) INJECTION EVERY 12 HOURS SCHEDULED
Status: CANCELLED | OUTPATIENT
Start: 2018-11-29

## 2018-11-30 ENCOUNTER — HOSPITAL ENCOUNTER (OUTPATIENT)
Facility: HOSPITAL | Age: 53
Discharge: HOME | End: 2018-12-01
Attending: OBSTETRICS & GYNECOLOGY | Admitting: OBSTETRICS & GYNECOLOGY

## 2018-11-30 ENCOUNTER — TELEPHONE (OUTPATIENT)
Dept: GYNECOLOGIC ONCOLOGY | Facility: CLINIC | Age: 53
End: 2018-11-30

## 2018-11-30 ENCOUNTER — ANESTHESIA (OUTPATIENT)
Dept: PERIOP | Facility: HOSPITAL | Age: 53
End: 2018-11-30

## 2018-11-30 DIAGNOSIS — C54.1 ENDOMETRIAL CANCER (HCC): ICD-10-CM

## 2018-11-30 PROBLEM — C53.0 MALIGNANT NEOPLASM OF ENDOCERVIX (HCC): Status: ACTIVE | Noted: 2018-11-30

## 2018-11-30 LAB
ABO GROUP BLD: NORMAL
B-HCG UR QL: NEGATIVE
INTERNAL NEGATIVE CONTROL: NEGATIVE
INTERNAL POSITIVE CONTROL: POSITIVE
Lab: NORMAL
RH BLD: NEGATIVE

## 2018-11-30 PROCEDURE — 88360 TUMOR IMMUNOHISTOCHEM/MANUAL: CPT | Performed by: OBSTETRICS & GYNECOLOGY

## 2018-11-30 PROCEDURE — 88331 PATH CONSLTJ SURG 1 BLK 1SPC: CPT | Performed by: PATHOLOGY

## 2018-11-30 PROCEDURE — G0378 HOSPITAL OBSERVATION PER HR: HCPCS

## 2018-11-30 PROCEDURE — 86901 BLOOD TYPING SEROLOGIC RH(D): CPT

## 2018-11-30 PROCEDURE — 25010000002 CEFOXITIN: Performed by: OBSTETRICS & GYNECOLOGY

## 2018-11-30 PROCEDURE — 51702 INSERT TEMP BLADDER CATH: CPT

## 2018-11-30 PROCEDURE — 25010000002 FENTANYL CITRATE (PF) 100 MCG/2ML SOLUTION: Performed by: NURSE ANESTHETIST, CERTIFIED REGISTERED

## 2018-11-30 PROCEDURE — 88305 TISSUE EXAM BY PATHOLOGIST: CPT | Performed by: OBSTETRICS & GYNECOLOGY

## 2018-11-30 PROCEDURE — 25010000002 PROPOFOL 10 MG/ML EMULSION: Performed by: NURSE ANESTHETIST, CERTIFIED REGISTERED

## 2018-11-30 PROCEDURE — 88341 IMHCHEM/IMCYTCHM EA ADD ANTB: CPT | Performed by: OBSTETRICS & GYNECOLOGY

## 2018-11-30 PROCEDURE — 58571 TLH W/T/O 250 G OR LESS: CPT | Performed by: OBSTETRICS & GYNECOLOGY

## 2018-11-30 PROCEDURE — 25010000002 ONDANSETRON PER 1 MG: Performed by: NURSE ANESTHETIST, CERTIFIED REGISTERED

## 2018-11-30 PROCEDURE — 25010000002 DEXAMETHASONE PER 1 MG: Performed by: NURSE ANESTHETIST, CERTIFIED REGISTERED

## 2018-11-30 PROCEDURE — 88307 TISSUE EXAM BY PATHOLOGIST: CPT | Performed by: OBSTETRICS & GYNECOLOGY

## 2018-11-30 PROCEDURE — 38572 LAPAROSCOPY LYMPHADENECTOMY: CPT | Performed by: OBSTETRICS & GYNECOLOGY

## 2018-11-30 PROCEDURE — 81025 URINE PREGNANCY TEST: CPT | Performed by: ANESTHESIOLOGY

## 2018-11-30 PROCEDURE — 86900 BLOOD TYPING SEROLOGIC ABO: CPT

## 2018-11-30 RX ORDER — BUPIVACAINE HYDROCHLORIDE AND EPINEPHRINE 5; 5 MG/ML; UG/ML
INJECTION, SOLUTION PERINEURAL AS NEEDED
Status: DISCONTINUED | OUTPATIENT
Start: 2018-11-30 | End: 2018-11-30 | Stop reason: HOSPADM

## 2018-11-30 RX ORDER — PREGABALIN 150 MG/1
150 CAPSULE ORAL ONCE
Status: COMPLETED | OUTPATIENT
Start: 2018-11-30 | End: 2018-11-30

## 2018-11-30 RX ORDER — ONDANSETRON 4 MG/1
8 TABLET, ORALLY DISINTEGRATING ORAL EVERY 6 HOURS
Qty: 10 TABLET | Refills: 3 | OUTPATIENT
Start: 2018-11-30

## 2018-11-30 RX ORDER — MAGNESIUM HYDROXIDE 1200 MG/15ML
LIQUID ORAL AS NEEDED
Status: DISCONTINUED | OUTPATIENT
Start: 2018-11-30 | End: 2018-11-30 | Stop reason: HOSPADM

## 2018-11-30 RX ORDER — LABETALOL HYDROCHLORIDE 5 MG/ML
INJECTION, SOLUTION INTRAVENOUS AS NEEDED
Status: DISCONTINUED | OUTPATIENT
Start: 2018-11-30 | End: 2018-11-30 | Stop reason: SURG

## 2018-11-30 RX ORDER — CELECOXIB 200 MG/1
200 CAPSULE ORAL ONCE
Status: COMPLETED | OUTPATIENT
Start: 2018-11-30 | End: 2018-11-30

## 2018-11-30 RX ORDER — ZOLPIDEM TARTRATE 5 MG/1
5 TABLET ORAL NIGHTLY PRN
Status: DISCONTINUED | OUTPATIENT
Start: 2018-11-30 | End: 2018-12-01 | Stop reason: HOSPADM

## 2018-11-30 RX ORDER — PROPOFOL 10 MG/ML
VIAL (ML) INTRAVENOUS CONTINUOUS PRN
Status: DISCONTINUED | OUTPATIENT
Start: 2018-11-30 | End: 2018-11-30 | Stop reason: SURG

## 2018-11-30 RX ORDER — SODIUM CHLORIDE 9 MG/ML
75 INJECTION, SOLUTION INTRAVENOUS CONTINUOUS
Status: DISCONTINUED | OUTPATIENT
Start: 2018-11-30 | End: 2018-12-01 | Stop reason: HOSPADM

## 2018-11-30 RX ORDER — PROMETHAZINE HYDROCHLORIDE 25 MG/1
25 SUPPOSITORY RECTAL ONCE AS NEEDED
Status: DISCONTINUED | OUTPATIENT
Start: 2018-11-30 | End: 2018-11-30 | Stop reason: HOSPADM

## 2018-11-30 RX ORDER — FENTANYL CITRATE 50 UG/ML
50 INJECTION, SOLUTION INTRAMUSCULAR; INTRAVENOUS
Status: DISCONTINUED | OUTPATIENT
Start: 2018-11-30 | End: 2018-11-30 | Stop reason: HOSPADM

## 2018-11-30 RX ORDER — DEXAMETHASONE SODIUM PHOSPHATE 4 MG/ML
INJECTION, SOLUTION INTRA-ARTICULAR; INTRALESIONAL; INTRAMUSCULAR; INTRAVENOUS; SOFT TISSUE AS NEEDED
Status: DISCONTINUED | OUTPATIENT
Start: 2018-11-30 | End: 2018-11-30 | Stop reason: SURG

## 2018-11-30 RX ORDER — ONDANSETRON 4 MG/1
4 TABLET, FILM COATED ORAL EVERY 6 HOURS PRN
Status: DISCONTINUED | OUTPATIENT
Start: 2018-11-30 | End: 2018-12-01 | Stop reason: HOSPADM

## 2018-11-30 RX ORDER — GLYCOPYRROLATE 0.2 MG/ML
INJECTION INTRAMUSCULAR; INTRAVENOUS AS NEEDED
Status: DISCONTINUED | OUTPATIENT
Start: 2018-11-30 | End: 2018-11-30 | Stop reason: SURG

## 2018-11-30 RX ORDER — ONDANSETRON 2 MG/ML
4 INJECTION INTRAMUSCULAR; INTRAVENOUS EVERY 6 HOURS PRN
Status: DISCONTINUED | OUTPATIENT
Start: 2018-11-30 | End: 2018-12-01 | Stop reason: HOSPADM

## 2018-11-30 RX ORDER — IBUPROFEN 600 MG/1
600 TABLET ORAL EVERY 6 HOURS SCHEDULED
Status: DISCONTINUED | OUTPATIENT
Start: 2018-11-30 | End: 2018-12-01 | Stop reason: HOSPADM

## 2018-11-30 RX ORDER — DOCUSATE SODIUM 100 MG/1
200 CAPSULE, LIQUID FILLED ORAL 2 TIMES DAILY
Qty: 60 CAPSULE | Refills: 3 | OUTPATIENT
Start: 2018-11-30

## 2018-11-30 RX ORDER — SODIUM CHLORIDE, SODIUM LACTATE, POTASSIUM CHLORIDE, CALCIUM CHLORIDE 600; 310; 30; 20 MG/100ML; MG/100ML; MG/100ML; MG/100ML
INJECTION, SOLUTION INTRAVENOUS CONTINUOUS PRN
Status: DISCONTINUED | OUTPATIENT
Start: 2018-11-30 | End: 2018-11-30 | Stop reason: SURG

## 2018-11-30 RX ORDER — FAMOTIDINE 20 MG/1
20 TABLET, FILM COATED ORAL ONCE
Status: COMPLETED | OUTPATIENT
Start: 2018-11-30 | End: 2018-11-30

## 2018-11-30 RX ORDER — PROMETHAZINE HYDROCHLORIDE 25 MG/ML
6.25 INJECTION, SOLUTION INTRAMUSCULAR; INTRAVENOUS ONCE AS NEEDED
Status: DISCONTINUED | OUTPATIENT
Start: 2018-11-30 | End: 2018-11-30 | Stop reason: HOSPADM

## 2018-11-30 RX ORDER — ATRACURIUM BESYLATE 10 MG/ML
INJECTION, SOLUTION INTRAVENOUS AS NEEDED
Status: DISCONTINUED | OUTPATIENT
Start: 2018-11-30 | End: 2018-11-30 | Stop reason: SURG

## 2018-11-30 RX ORDER — LIDOCAINE HYDROCHLORIDE 10 MG/ML
0.5 INJECTION, SOLUTION EPIDURAL; INFILTRATION; INTRACAUDAL; PERINEURAL ONCE AS NEEDED
Status: DISCONTINUED | OUTPATIENT
Start: 2018-11-30 | End: 2018-11-30

## 2018-11-30 RX ORDER — EPHEDRINE SULFATE 50 MG/ML
5 INJECTION, SOLUTION INTRAVENOUS ONCE AS NEEDED
Status: DISCONTINUED | OUTPATIENT
Start: 2018-11-30 | End: 2018-11-30 | Stop reason: HOSPADM

## 2018-11-30 RX ORDER — ONDANSETRON 2 MG/ML
INJECTION INTRAMUSCULAR; INTRAVENOUS AS NEEDED
Status: DISCONTINUED | OUTPATIENT
Start: 2018-11-30 | End: 2018-11-30 | Stop reason: SURG

## 2018-11-30 RX ORDER — SODIUM CHLORIDE, SODIUM LACTATE, POTASSIUM CHLORIDE, CALCIUM CHLORIDE 600; 310; 30; 20 MG/100ML; MG/100ML; MG/100ML; MG/100ML
9 INJECTION, SOLUTION INTRAVENOUS CONTINUOUS
Status: DISCONTINUED | OUTPATIENT
Start: 2018-11-30 | End: 2018-11-30

## 2018-11-30 RX ORDER — IBUPROFEN 600 MG/1
600 TABLET ORAL EVERY 6 HOURS PRN
Qty: 20 TABLET | Refills: 3 | OUTPATIENT
Start: 2018-11-30

## 2018-11-30 RX ORDER — OXYCODONE HYDROCHLORIDE 5 MG/1
TABLET ORAL
Qty: 10 TABLET | Refills: 0
Start: 2018-11-30

## 2018-11-30 RX ORDER — FAMOTIDINE 20 MG/1
20 TABLET, FILM COATED ORAL 2 TIMES DAILY
Status: DISCONTINUED | OUTPATIENT
Start: 2018-11-30 | End: 2018-12-01 | Stop reason: HOSPADM

## 2018-11-30 RX ORDER — PROMETHAZINE HYDROCHLORIDE 25 MG/ML
12.5 INJECTION, SOLUTION INTRAMUSCULAR; INTRAVENOUS EVERY 6 HOURS PRN
Status: DISCONTINUED | OUTPATIENT
Start: 2018-11-30 | End: 2018-12-01 | Stop reason: HOSPADM

## 2018-11-30 RX ORDER — ACETAMINOPHEN 325 MG/1
650 TABLET ORAL EVERY 4 HOURS
Status: DISCONTINUED | OUTPATIENT
Start: 2018-11-30 | End: 2018-12-01 | Stop reason: HOSPADM

## 2018-11-30 RX ORDER — ACETAMINOPHEN 325 MG/1
650 TABLET ORAL EVERY 4 HOURS PRN
Qty: 20 TABLET | Refills: 3 | OUTPATIENT
Start: 2018-11-30 | End: 2019-11-30

## 2018-11-30 RX ORDER — SODIUM CHLORIDE 0.9 % (FLUSH) 0.9 %
3-10 SYRINGE (ML) INJECTION AS NEEDED
Status: DISCONTINUED | OUTPATIENT
Start: 2018-11-30 | End: 2018-11-30

## 2018-11-30 RX ORDER — LISINOPRIL 10 MG/1
10 TABLET ORAL DAILY
Status: DISCONTINUED | OUTPATIENT
Start: 2018-11-30 | End: 2018-12-01 | Stop reason: HOSPADM

## 2018-11-30 RX ORDER — LIDOCAINE HYDROCHLORIDE 10 MG/ML
0.5 INJECTION, SOLUTION EPIDURAL; INFILTRATION; INTRACAUDAL; PERINEURAL ONCE AS NEEDED
Status: COMPLETED | OUTPATIENT
Start: 2018-11-30 | End: 2018-11-30

## 2018-11-30 RX ORDER — SODIUM CHLORIDE 0.9 % (FLUSH) 0.9 %
3 SYRINGE (ML) INJECTION EVERY 12 HOURS SCHEDULED
Status: DISCONTINUED | OUTPATIENT
Start: 2018-11-30 | End: 2018-11-30

## 2018-11-30 RX ORDER — PROMETHAZINE HYDROCHLORIDE 12.5 MG/1
12.5 SUPPOSITORY RECTAL EVERY 6 HOURS PRN
Status: DISCONTINUED | OUTPATIENT
Start: 2018-11-30 | End: 2018-12-01 | Stop reason: HOSPADM

## 2018-11-30 RX ORDER — NALOXONE HCL 0.4 MG/ML
0.1 VIAL (ML) INJECTION
Status: DISCONTINUED | OUTPATIENT
Start: 2018-11-30 | End: 2018-12-01 | Stop reason: HOSPADM

## 2018-11-30 RX ORDER — PROPOFOL 10 MG/ML
VIAL (ML) INTRAVENOUS AS NEEDED
Status: DISCONTINUED | OUTPATIENT
Start: 2018-11-30 | End: 2018-11-30 | Stop reason: SURG

## 2018-11-30 RX ORDER — LIDOCAINE HYDROCHLORIDE 10 MG/ML
INJECTION, SOLUTION INFILTRATION; PERINEURAL AS NEEDED
Status: DISCONTINUED | OUTPATIENT
Start: 2018-11-30 | End: 2018-11-30 | Stop reason: SURG

## 2018-11-30 RX ORDER — ACETAMINOPHEN 325 MG/1
650 TABLET ORAL ONCE
Status: COMPLETED | OUTPATIENT
Start: 2018-11-30 | End: 2018-11-30

## 2018-11-30 RX ORDER — HYDROMORPHONE HYDROCHLORIDE 1 MG/ML
0.5 INJECTION, SOLUTION INTRAMUSCULAR; INTRAVENOUS; SUBCUTANEOUS
Status: DISCONTINUED | OUTPATIENT
Start: 2018-11-30 | End: 2018-11-30 | Stop reason: HOSPADM

## 2018-11-30 RX ORDER — CALCIUM CARBONATE 200(500)MG
2 TABLET,CHEWABLE ORAL 3 TIMES DAILY PRN
Status: DISCONTINUED | OUTPATIENT
Start: 2018-11-30 | End: 2018-12-01 | Stop reason: HOSPADM

## 2018-11-30 RX ORDER — SODIUM CHLORIDE 9 MG/ML
INJECTION, SOLUTION INTRAVENOUS AS NEEDED
Status: DISCONTINUED | OUTPATIENT
Start: 2018-11-30 | End: 2018-11-30 | Stop reason: HOSPADM

## 2018-11-30 RX ORDER — PROMETHAZINE HYDROCHLORIDE 12.5 MG/1
12.5 TABLET ORAL EVERY 6 HOURS PRN
Status: DISCONTINUED | OUTPATIENT
Start: 2018-11-30 | End: 2018-12-01 | Stop reason: HOSPADM

## 2018-11-30 RX ORDER — FENTANYL CITRATE 50 UG/ML
INJECTION, SOLUTION INTRAMUSCULAR; INTRAVENOUS AS NEEDED
Status: DISCONTINUED | OUTPATIENT
Start: 2018-11-30 | End: 2018-11-30 | Stop reason: SURG

## 2018-11-30 RX ORDER — CETIRIZINE HYDROCHLORIDE 10 MG/1
10 TABLET ORAL EVERY OTHER DAY
Status: DISCONTINUED | OUTPATIENT
Start: 2018-11-30 | End: 2018-12-01 | Stop reason: HOSPADM

## 2018-11-30 RX ORDER — PROMETHAZINE HYDROCHLORIDE 25 MG/1
25 TABLET ORAL ONCE AS NEEDED
Status: DISCONTINUED | OUTPATIENT
Start: 2018-11-30 | End: 2018-11-30 | Stop reason: HOSPADM

## 2018-11-30 RX ORDER — NEOSTIGMINE METHYLSULFATE 5 MG/5 ML
SYRINGE (ML) INTRAVENOUS AS NEEDED
Status: DISCONTINUED | OUTPATIENT
Start: 2018-11-30 | End: 2018-11-30 | Stop reason: SURG

## 2018-11-30 RX ORDER — OXYCODONE HYDROCHLORIDE 5 MG/1
5 TABLET ORAL EVERY 4 HOURS PRN
Status: DISCONTINUED | OUTPATIENT
Start: 2018-11-30 | End: 2018-12-01 | Stop reason: HOSPADM

## 2018-11-30 RX ORDER — ESMOLOL HYDROCHLORIDE 10 MG/ML
INJECTION INTRAVENOUS AS NEEDED
Status: DISCONTINUED | OUTPATIENT
Start: 2018-11-30 | End: 2018-11-30 | Stop reason: SURG

## 2018-11-30 RX ORDER — DOCUSATE SODIUM 100 MG/1
200 CAPSULE, LIQUID FILLED ORAL 2 TIMES DAILY
Status: DISCONTINUED | OUTPATIENT
Start: 2018-11-30 | End: 2018-12-01 | Stop reason: HOSPADM

## 2018-11-30 RX ORDER — HYDROMORPHONE HYDROCHLORIDE 1 MG/ML
0.5 INJECTION, SOLUTION INTRAMUSCULAR; INTRAVENOUS; SUBCUTANEOUS
Status: DISCONTINUED | OUTPATIENT
Start: 2018-11-30 | End: 2018-12-01 | Stop reason: HOSPADM

## 2018-11-30 RX ADMIN — FAMOTIDINE 20 MG: 20 TABLET, FILM COATED ORAL at 06:44

## 2018-11-30 RX ADMIN — FAMOTIDINE 20 MG: 20 TABLET ORAL at 21:33

## 2018-11-30 RX ADMIN — PREGABALIN 150 MG: 150 CAPSULE ORAL at 06:44

## 2018-11-30 RX ADMIN — CELECOXIB 200 MG: 200 CAPSULE ORAL at 07:35

## 2018-11-30 RX ADMIN — ACETAMINOPHEN 650 MG: 325 TABLET ORAL at 21:32

## 2018-11-30 RX ADMIN — PROPOFOL 150 MG: 10 INJECTION, EMULSION INTRAVENOUS at 08:05

## 2018-11-30 RX ADMIN — LIDOCAINE HYDROCHLORIDE 50 MG: 10 INJECTION, SOLUTION INFILTRATION; PERINEURAL at 08:05

## 2018-11-30 RX ADMIN — ATRACURIUM BESYLATE 10 MG: 10 INJECTION, SOLUTION INTRAVENOUS at 09:17

## 2018-11-30 RX ADMIN — FENTANYL CITRATE 50 MCG: 50 INJECTION, SOLUTION INTRAMUSCULAR; INTRAVENOUS at 08:05

## 2018-11-30 RX ADMIN — GLYCOPYRROLATE 0.4 MG: 0.2 INJECTION, SOLUTION INTRAMUSCULAR; INTRAVENOUS at 11:07

## 2018-11-30 RX ADMIN — SODIUM CHLORIDE, POTASSIUM CHLORIDE, SODIUM LACTATE AND CALCIUM CHLORIDE 9 ML/HR: 600; 310; 30; 20 INJECTION, SOLUTION INTRAVENOUS at 06:32

## 2018-11-30 RX ADMIN — PROPOFOL 25 MCG/KG/MIN: 10 INJECTION, EMULSION INTRAVENOUS at 08:15

## 2018-11-30 RX ADMIN — CEFOXITIN 2 G: 2 INJECTION, POWDER, FOR SOLUTION INTRAVENOUS at 08:00

## 2018-11-30 RX ADMIN — Medication 4 MG: at 11:07

## 2018-11-30 RX ADMIN — ATRACURIUM BESYLATE 40 MG: 10 INJECTION, SOLUTION INTRAVENOUS at 08:05

## 2018-11-30 RX ADMIN — DEXAMETHASONE SODIUM PHOSPHATE 8 MG: 4 INJECTION, SOLUTION INTRAMUSCULAR; INTRAVENOUS at 08:05

## 2018-11-30 RX ADMIN — ATRACURIUM BESYLATE 10 MG: 10 INJECTION, SOLUTION INTRAVENOUS at 08:53

## 2018-11-30 RX ADMIN — ATRACURIUM BESYLATE 10 MG: 10 INJECTION, SOLUTION INTRAVENOUS at 10:13

## 2018-11-30 RX ADMIN — ATRACURIUM BESYLATE 10 MG: 10 INJECTION, SOLUTION INTRAVENOUS at 10:45

## 2018-11-30 RX ADMIN — LABETALOL HYDROCHLORIDE 5 MG: 5 INJECTION, SOLUTION INTRAVENOUS at 09:25

## 2018-11-30 RX ADMIN — LISINOPRIL 10 MG: 10 TABLET ORAL at 21:33

## 2018-11-30 RX ADMIN — ACETAMINOPHEN 650 MG: 325 TABLET, FILM COATED ORAL at 06:44

## 2018-11-30 RX ADMIN — SODIUM CHLORIDE, POTASSIUM CHLORIDE, SODIUM LACTATE AND CALCIUM CHLORIDE: 600; 310; 30; 20 INJECTION, SOLUTION INTRAVENOUS at 08:00

## 2018-11-30 RX ADMIN — FENTANYL CITRATE 50 MCG: 50 INJECTION, SOLUTION INTRAMUSCULAR; INTRAVENOUS at 11:08

## 2018-11-30 RX ADMIN — ONDANSETRON 4 MG: 2 INJECTION INTRAMUSCULAR; INTRAVENOUS at 11:04

## 2018-11-30 RX ADMIN — SODIUM CHLORIDE 75 ML/HR: 9 INJECTION, SOLUTION INTRAVENOUS at 17:00

## 2018-11-30 RX ADMIN — ESMOLOL HYDROCHLORIDE 50 MG: 10 INJECTION INTRAVENOUS at 08:05

## 2018-11-30 RX ADMIN — IBUPROFEN 600 MG: 600 TABLET ORAL at 17:08

## 2018-11-30 RX ADMIN — LIDOCAINE HYDROCHLORIDE 0.5 ML: 10 INJECTION, SOLUTION EPIDURAL; INFILTRATION; INTRACAUDAL; PERINEURAL at 06:32

## 2018-11-30 RX ADMIN — DOCUSATE SODIUM 200 MG: 100 CAPSULE, LIQUID FILLED ORAL at 21:32

## 2018-11-30 NOTE — TELEPHONE ENCOUNTER
Phoned Dr. Davis's office, spoke with Naty, requested latest pap result be faxed per Dr. Jaffe request.  Naty to fax one from October 2018.

## 2018-12-01 VITALS
RESPIRATION RATE: 16 BRPM | HEIGHT: 67 IN | HEART RATE: 74 BPM | SYSTOLIC BLOOD PRESSURE: 138 MMHG | BODY MASS INDEX: 38.61 KG/M2 | TEMPERATURE: 98 F | WEIGHT: 246 LBS | OXYGEN SATURATION: 96 % | DIASTOLIC BLOOD PRESSURE: 72 MMHG

## 2018-12-01 LAB
ANION GAP SERPL CALCULATED.3IONS-SCNC: 5 MMOL/L (ref 3–11)
BUN BLD-MCNC: 8 MG/DL (ref 9–23)
BUN/CREAT SERPL: 11.8 (ref 7–25)
CALCIUM SPEC-SCNC: 8.5 MG/DL (ref 8.7–10.4)
CHLORIDE SERPL-SCNC: 100 MMOL/L (ref 99–109)
CO2 SERPL-SCNC: 30 MMOL/L (ref 20–31)
CREAT BLD-MCNC: 0.68 MG/DL (ref 0.6–1.3)
DEPRECATED RDW RBC AUTO: 39.3 FL (ref 37–54)
ERYTHROCYTE [DISTWIDTH] IN BLOOD BY AUTOMATED COUNT: 13 % (ref 11.3–14.5)
GFR SERPL CREATININE-BSD FRML MDRD: 90 ML/MIN/1.73
GLUCOSE BLD-MCNC: 99 MG/DL (ref 70–100)
HCT VFR BLD AUTO: 33.6 % (ref 34.5–44)
HGB BLD-MCNC: 10.7 G/DL (ref 11.5–15.5)
MCH RBC QN AUTO: 26 PG (ref 27–31)
MCHC RBC AUTO-ENTMCNC: 31.8 G/DL (ref 32–36)
MCV RBC AUTO: 81.8 FL (ref 80–99)
PLATELET # BLD AUTO: 219 10*3/MM3 (ref 150–450)
PMV BLD AUTO: 10.2 FL (ref 6–12)
POTASSIUM BLD-SCNC: 3.8 MMOL/L (ref 3.5–5.5)
RBC # BLD AUTO: 4.11 10*6/MM3 (ref 3.89–5.14)
SODIUM BLD-SCNC: 135 MMOL/L (ref 132–146)
WBC NRBC COR # BLD: 8.36 10*3/MM3 (ref 3.5–10.8)

## 2018-12-01 PROCEDURE — G0378 HOSPITAL OBSERVATION PER HR: HCPCS

## 2018-12-01 PROCEDURE — 85027 COMPLETE CBC AUTOMATED: CPT | Performed by: OBSTETRICS & GYNECOLOGY

## 2018-12-01 PROCEDURE — 80048 BASIC METABOLIC PNL TOTAL CA: CPT | Performed by: OBSTETRICS & GYNECOLOGY

## 2018-12-01 RX ORDER — IBUPROFEN 600 MG/1
600 TABLET ORAL EVERY 6 HOURS SCHEDULED
Qty: 30 TABLET | Refills: 2 | Status: SHIPPED | OUTPATIENT
Start: 2018-12-01 | End: 2018-12-19

## 2018-12-01 RX ORDER — ACETAMINOPHEN 325 MG/1
650 TABLET ORAL EVERY 6 HOURS PRN
Qty: 60 TABLET | Refills: 0 | Status: SHIPPED | OUTPATIENT
Start: 2018-12-01 | End: 2018-12-19

## 2018-12-01 RX ORDER — ONDANSETRON 4 MG/1
4 TABLET, FILM COATED ORAL EVERY 6 HOURS PRN
Qty: 10 TABLET | Refills: 0 | Status: SHIPPED | OUTPATIENT
Start: 2018-12-01 | End: 2018-12-19

## 2018-12-01 RX ORDER — DOCUSATE SODIUM 250 MG
1 CAPSULE ORAL 2 TIMES DAILY
Qty: 60 CAPSULE | Refills: 3 | Status: SHIPPED | OUTPATIENT
Start: 2018-12-01 | End: 2018-12-19

## 2018-12-01 RX ADMIN — SODIUM CHLORIDE 75 ML/HR: 9 INJECTION, SOLUTION INTRAVENOUS at 05:38

## 2018-12-01 RX ADMIN — LISINOPRIL 10 MG: 10 TABLET ORAL at 08:19

## 2018-12-01 RX ADMIN — ACETAMINOPHEN 650 MG: 325 TABLET ORAL at 08:19

## 2018-12-01 RX ADMIN — IBUPROFEN 600 MG: 600 TABLET ORAL at 12:59

## 2018-12-01 RX ADMIN — FAMOTIDINE 20 MG: 20 TABLET ORAL at 08:19

## 2018-12-01 RX ADMIN — IBUPROFEN 600 MG: 600 TABLET ORAL at 00:18

## 2018-12-01 RX ADMIN — ACETAMINOPHEN 650 MG: 325 TABLET ORAL at 00:18

## 2018-12-01 RX ADMIN — IBUPROFEN 600 MG: 600 TABLET ORAL at 05:38

## 2018-12-01 RX ADMIN — ACETAMINOPHEN 650 MG: 325 TABLET ORAL at 12:59

## 2018-12-01 RX ADMIN — ACETAMINOPHEN 650 MG: 325 TABLET ORAL at 04:31

## 2018-12-01 RX ADMIN — DOCUSATE SODIUM 200 MG: 100 CAPSULE, LIQUID FILLED ORAL at 08:19

## 2018-12-01 NOTE — PROGRESS NOTES
Gynecologic Oncology   Daily Progress Note    Chief Complaint: post op    Subjective   Patient did well overnight.  Her pain is controlled.  She is not having nausea or emesis.  She is tolerating a regular diet.  She is ambulating.  She is using her incentive spirometer. She has passed a voiding trial, but does not have a large amount of urine output.  She and her  have further questions about surgery, plan, discharge.      Objective   Temp:  [97.4 °F (36.3 °C)-99.3 °F (37.4 °C)] 98.2 °F (36.8 °C)  Heart Rate:  [] 77  Resp:  [16] 16  BP: (117-178)/(66-96) 127/69  Vitals:    12/01/18 0810   BP: 127/69   Pulse: 77   Resp: 16   Temp: 98.2 °F (36.8 °C)   SpO2:      I/O last 3 completed shifts:  In: 2895 [I.V.:2695; Other:200]  Out: 3500 [Urine:3400; Blood:100]     GENERAL: Alert, well-appearing female in no apparent distress.    CARDIOVASCULAR: Normal rate, regular rhythm, no murmurs, rubs, or gallops.    RESPIRATORY: Clear to auscultation bilaterally, normal respiratory effort  GASTROINTESTINAL:  Soft, appropriately tender, non-distended, no rebound or guarding.  Positive bowel sounds.  Incision(s) c/d/i.  GENITOURINARY: valerio previously removed.    SKIN:  Warm, dry, well-perfused.    PSYCHIATRIC: AO x3, with appropriate affect, normal thought processes  EXREMITIES: Symmetric. No peripheral edema.    Lab Results   Component Value Date    WBC 8.36 12/01/2018    HGB 10.7 (L) 12/01/2018    HCT 33.6 (L) 12/01/2018    MCV 81.8 12/01/2018     12/01/2018    NEUTROABS 5.41 11/28/2018    GLUCOSE 99 12/01/2018    BUN 8 (L) 12/01/2018    CREATININE 0.68 12/01/2018    EGFRIFNONA 90 12/01/2018     12/01/2018    K 3.8 12/01/2018     12/01/2018    CO2 30.0 12/01/2018    CALCIUM 8.5 (L) 12/01/2018         Assessment/Plan   Tiarra Becerra is a 53 y.o. female POD#1 s/p robotic assisted total laparoscopic hysterectomy, bilateral salpingo-oophorectomy, left radical parametrectomy with ureteral lysis, which no  biopsy, pelvic lymph node sampling.   Kept in hospital for acute postoperative urinary retention.    1.  Post-operative care  -Routine care (encourage ambulation, IS use, advance diet as tolerated, saline lock IV, bowel regimen, VTE prophylaxis)    2.  Comorbidities include hypertension, obesity, and allergies    3.  Disposition  -Anticipate discharge home today.  Grant catheter will be re-anchored if indicated.  Discharge precautions of been discussed multiple times.  Patient is to follow-up 12/3/2018 for voiding trial if she is unable to void appropriately today.  Otherwise, I like to see her back 12/5/2018 or 12/6/2018 for discussion of pathology.         April Jaffe MD  12/01/18  9:30 AM

## 2018-12-01 NOTE — PLAN OF CARE
Problem: Patient Care Overview  Goal: Plan of Care Review  Outcome: Ongoing (interventions implemented as appropriate)   12/01/18 0459   Coping/Psychosocial   Plan of Care Reviewed With patient   Plan of Care Review   Progress no change   OTHER   Outcome Summary VSS, UOP-WNL bladder trial to be done this a.m. and d/c f/c. pain controlled with scheduled meds. Pt slept well. will continue to monitor.      Goal: Individualization and Mutuality  Outcome: Ongoing (interventions implemented as appropriate)    Goal: Discharge Needs Assessment  Outcome: Ongoing (interventions implemented as appropriate)    Goal: Interprofessional Rounds/Family Conf  Outcome: Ongoing (interventions implemented as appropriate)      Problem: Hysterectomy (Adult)  Goal: Signs and Symptoms of Listed Potential Problems Will be Absent, Minimized or Managed (Hysterectomy)  Outcome: Ongoing (interventions implemented as appropriate)    Goal: Anesthesia/Sedation Recovery  Outcome: Ongoing (interventions implemented as appropriate)

## 2018-12-01 NOTE — DISCHARGE SUMMARY
Gynecologic Oncology   [facility]   Discharge Summary    Date of Admission: 11/30/2018    Date of Discharge:     Admission Diagnoses:    Endometrial cancer  Hypertension  Obesity    Discharge Diagnoses:   Endometrial cancer  Newly diagnosed cervical cancer  Hypertension  Obesity  Acute postoperative urinary retention  Postoperative state      Hospital Course:  Tiarra Becerra is a 53 y.o. female who presented with pathology proven grade 1 endometrioid adenocarcinoma in a background of atypical hyperplasia with a normal Pap smear.      On 11/30/2018 she was admitted and underwent robotic-assisted total laparoscopic hysterectomy, bilateral salpingo-oophorectomy, radical left parametrectomy, peritoneal biopsy, and pelvic lymph node sampling.  Please refer to dictated operative note for further details.  Of interest, 2 separate cancers were noted on frozen section leading to the noted procedure.  Post-operatively she did well.  However, she had acute postoperative day 0 urinary retention and plan was made for 23 hour observation.  Her diet was advanced.  Nonsurgical medical illnesses were appropriately managed during her hospital stay.  Her POD1 labs showed anticipated decrease in H/H.  Chemistries were unremarkable.  By POD1 she was tolerating a general diet,  having her pain controlled with oral medications, and otherwise meeting criteria for discharge and she was discharged home in stable condition.  She underwent a voiding trial postoperative day 1 and pass the voiding trial.  She is undergoing observation until mid afternoon to determine if she is truly emptying her bladder adequately.  If she is not, Grant will be re-anchored and patient will follow-up 12/3/2018 for voiding trial.  Otherwise she is to follow-up 12/5 or 12/6 for discussion of final pathology and treatment plan.    Discharge Medications:     Discharge Medications      New Medications      Instructions Start Date   acetaminophen 325 MG  tablet  Commonly known as:  TYLENOL   650 mg, Oral, Every 6 Hours PRN      docusate sodium 250 MG capsule   200 mg, Oral, 2 Times Daily      ibuprofen 600 MG tablet  Commonly known as:  ADVIL,MOTRIN   600 mg, Oral, Every 6 Hours Scheduled      ondansetron 4 MG tablet  Commonly known as:  ZOFRAN   4 mg, Oral, Every 6 Hours PRN      oxyCODONE 5 MG immediate release tablet  Commonly known as:  ROXICODONE   5-10 mg, Oral         Continue These Medications      Instructions Start Date   cetirizine 10 MG tablet  Commonly known as:  zyrTEC  Notes to patient:  Every other day   10 mg, Oral, Every Other Day      lisinopril 10 MG tablet  Commonly known as:  PRINIVIL,ZESTRIL   10 mg, Oral, Daily      VITAMIN D-3 PO   2,000 Units, Oral, Daily             Discharge Instructions:  She was instructed to call or return to medical attention for fever, severe pain, persistent nausea and vomiting, questions regarding medications, concerns regarding her incisions, excessive vaginal bleeding or discharge, or for any other acute concerns.  She was also instructed not to drive while taking narcotic pain medications, to abide by pelvic rest, avoid tub baths, and to avoid heavy lifting for at least 6 weeks.  Patient was educated regarding constipation prevention.      Condition at Discharge: Stable    Discharge Destination: Home    Results pending at time of discharge: Final surgical pathology     Follow Up: As noted above    Electronically Signed by: April Jaffe MD  Date: 12/1/2018      Time:  9:57 AM

## 2018-12-01 NOTE — PLAN OF CARE
Problem: Patient Care Overview  Goal: Plan of Care Review  Outcome: Ongoing (interventions implemented as appropriate)   12/01/18 0800 12/01/18 1343   Coping/Psychosocial   Plan of Care Reviewed With patient;spouse --    Plan of Care Review   Progress --  improving   OTHER   Outcome Summary --  VSS pt able to void spontaiously no issues pain controlled with po meds ambulating independent in room and hallway ready for d/c home today       Problem: Hysterectomy (Adult)  Goal: Signs and Symptoms of Listed Potential Problems Will be Absent, Minimized or Managed (Hysterectomy)  Outcome: Ongoing (interventions implemented as appropriate)   11/30/18 1532   Goal/Outcome Evaluation   Problems Assessed (Hysterectomy) bleeding;infection;pain;situational response;postoperative urinary retention   Problems Present (Hysterectomy) postoperative urinary retention;pain

## 2018-12-03 ENCOUNTER — TRANSITIONAL CARE MANAGEMENT TELEPHONE ENCOUNTER (OUTPATIENT)
Dept: INTERNAL MEDICINE | Facility: CLINIC | Age: 53
End: 2018-12-03

## 2018-12-03 NOTE — OUTREACH NOTE
CARLOS call completed. Please see flow sheet for additional details.  Pt says she's doing well. Denies fever, d/c. Taking stool softener.  Mild nausea after ibuprofen, but no emesis: will try taking it w/ food.  Eating/drinking well.  Pain controlled w/ ibuprofen. Confirmed appt dr Jaffe 12/6/18 and Dr Anatoliy GONZALEZ 12/17/18

## 2018-12-04 ENCOUNTER — DOCUMENTATION (OUTPATIENT)
Dept: GYNECOLOGIC ONCOLOGY | Facility: CLINIC | Age: 53
End: 2018-12-04

## 2018-12-04 ENCOUNTER — TELEPHONE (OUTPATIENT)
Dept: GYNECOLOGIC ONCOLOGY | Facility: CLINIC | Age: 53
End: 2018-12-04

## 2018-12-04 NOTE — TELEPHONE ENCOUNTER
Phoned insurance company since had not received anything on [employer] for authorization for pt's surgery submitted online 11-28-18 for pt's surgery 11-30-18, spoke with Radha, who spoke nurse, determination is being worked on at present, will fax when ready, I informed her of pt's surgery being performed already and her and of incidental Cervical cancer being found, I would fax operative report and pathology.

## 2018-12-04 NOTE — PROGRESS NOTES
[facility] paperwork today.     Forms filled out and placed in Dr. Jaffe's box for signature.

## 2018-12-06 ENCOUNTER — TELEPHONE (OUTPATIENT)
Dept: GYNECOLOGIC ONCOLOGY | Facility: CLINIC | Age: 53
End: 2018-12-06

## 2018-12-06 NOTE — TELEPHONE ENCOUNTER
I called pt.  Pt reports doing well.  Path pending outside review (per Dr. Torre).  Cancel today appt and reschedule 2 wks from now.

## 2018-12-07 ENCOUNTER — OFFICE VISIT (OUTPATIENT)
Dept: INTERNAL MEDICINE | Facility: CLINIC | Age: 53
End: 2018-12-07

## 2018-12-07 VITALS
BODY MASS INDEX: 38.3 KG/M2 | TEMPERATURE: 97.7 F | HEART RATE: 68 BPM | WEIGHT: 244 LBS | OXYGEN SATURATION: 99 % | HEIGHT: 67 IN | SYSTOLIC BLOOD PRESSURE: 140 MMHG | DIASTOLIC BLOOD PRESSURE: 80 MMHG

## 2018-12-07 DIAGNOSIS — R30.0 DYSURIA: ICD-10-CM

## 2018-12-07 DIAGNOSIS — I10 ESSENTIAL HYPERTENSION: ICD-10-CM

## 2018-12-07 DIAGNOSIS — E78.1 HIGH BLOOD TRIGLYCERIDES: ICD-10-CM

## 2018-12-07 DIAGNOSIS — R82.90 ABNORMAL URINALYSIS: ICD-10-CM

## 2018-12-07 DIAGNOSIS — C53.0 MALIGNANT NEOPLASM OF ENDOCERVIX (HCC): ICD-10-CM

## 2018-12-07 DIAGNOSIS — C54.1 ENDOMETRIAL CANCER (HCC): Primary | ICD-10-CM

## 2018-12-07 LAB
BILIRUB BLD-MCNC: NEGATIVE MG/DL
CLARITY, POC: ABNORMAL
COLOR UR: YELLOW
GLUCOSE UR STRIP-MCNC: NEGATIVE MG/DL
KETONES UR QL: NEGATIVE
LEUKOCYTE EST, POC: ABNORMAL
NITRITE UR-MCNC: NEGATIVE MG/ML
PH UR: 6 [PH] (ref 5–8)
PROT UR STRIP-MCNC: NEGATIVE MG/DL
RBC # UR STRIP: ABNORMAL /UL
SP GR UR: 1.02 (ref 1–1.03)
UROBILINOGEN UR QL: NORMAL

## 2018-12-07 PROCEDURE — 81003 URINALYSIS AUTO W/O SCOPE: CPT | Performed by: FAMILY MEDICINE

## 2018-12-07 PROCEDURE — 99496 TRANSJ CARE MGMT HIGH F2F 7D: CPT | Performed by: FAMILY MEDICINE

## 2018-12-07 RX ORDER — LOSARTAN POTASSIUM 25 MG/1
25 TABLET ORAL DAILY
Qty: 30 TABLET | Refills: 1 | Status: SHIPPED | OUTPATIENT
Start: 2018-12-07 | End: 2019-02-07

## 2018-12-07 RX ORDER — NITROFURANTOIN 25; 75 MG/1; MG/1
100 CAPSULE ORAL 2 TIMES DAILY
Qty: 20 CAPSULE | Refills: 0 | Status: SHIPPED | OUTPATIENT
Start: 2018-12-07 | End: 2018-12-19

## 2018-12-07 NOTE — PROGRESS NOTES
"Transitional Care Follow Up Visit  Subjective     Tiarra Becerra is a 53 y.o. female who presents for a transitional care management visit.    Within 48 business hours after discharge our office contacted her via telephone to coordinate her care and needs.      I reviewed and discussed the details of that call along with the discharge summary, hospital problems, inpatient lab results, inpatient diagnostic studies, and consultation reports with Tiarra.     Current outpatient and discharge medications have been reconciled for the patient.    Visit Vitals  /80 (BP Location: Left arm, Patient Position: Sitting)   Pulse 68   Temp 97.7 °F (36.5 °C)   Ht 170.2 cm (67\")   Wt 111 kg (244 lb)   LMP 01/02/2018   SpO2 99%   BMI 38.22 kg/m²        Date of TCM Phone Call 12/3/2018   [facility]   Date of Admission 11/30/2018   Date of Discharge 12/1/2018   Discharge Disposition Home or Self Care     Risk for Readmission (LACE) Score: 3 (12/1/2018  6:00 AM)      Hypertension   This is a chronic problem. The current episode started more than 1 year ago. The problem is unchanged. The problem is controlled. Pertinent negatives include no anxiety, blurred vision, chest pain, headaches, malaise/fatigue, neck pain, orthopnea, palpitations, peripheral edema, PND, shortness of breath or sweats. There are no associated agents to hypertension. Risk factors for coronary artery disease include obesity and post-menopausal state. Past treatments include ACE inhibitors. Current antihypertension treatment includes angiotensin blockers (changing to ARB). The current treatment provides significant improvement. There are no compliance problems.  There is no history of angina, kidney disease, CAD/MI, CVA, heart failure, left ventricular hypertrophy, PVD or retinopathy. There is no history of renovascular disease or sleep apnea.      Course During Hospital Stay:  Gynecologic Oncology   [facility]   Discharge " Summary     Date of Admission: 11/30/2018     Date of Discharge:      Admission Diagnoses:    Endometrial cancer  Hypertension  Obesity     Discharge Diagnoses:   Endometrial cancer  Newly diagnosed cervical cancer  Hypertension  Obesity  Acute postoperative urinary retention  Postoperative state        Hospital Course:  Tiarra Becerra is a 53 y.o. female who presented with pathology proven grade 1 endometrioid adenocarcinoma in a background of atypical hyperplasia with a normal Pap smear.        On 11/30/2018 she was admitted and underwent robotic-assisted total laparoscopic hysterectomy, bilateral salpingo-oophorectomy, radical left parametrectomy, peritoneal biopsy, and pelvic lymph node sampling.  Please refer to dictated operative note for further details.  Of interest, 2 separate cancers were noted on frozen section leading to the noted procedure.  Post-operatively she did well.  However, she had acute postoperative day 0 urinary retention and plan was made for 23 hour observation.  Her diet was advanced.  Nonsurgical medical illnesses were appropriately managed during her hospital stay.  Her POD1 labs showed anticipated decrease in H/H.  Chemistries were unremarkable.  By POD1 she was tolerating a general diet,  having her pain controlled with oral medications, and otherwise meeting criteria for discharge and she was discharged home in stable condition.  She underwent a voiding trial postoperative day 1 and pass the voiding trial.  She is undergoing observation until mid afternoon to determine if she is truly emptying her bladder adequately.  If she is not, Grant will be re-anchored and patient will follow-up 12/3/2018 for voiding trial.  Otherwise she is to follow-up 12/5 or 12/6 for discussion of final pathology and treatment plan.  ______________________  Pt is here with her .  Pt has had dysuria the past 2 days.    Pt needs refill of BP meds. There was an article in NEJM that pt read that lisinopril  increases slightly the risk of lung cancer.  Pt quit smoking .  Pt would like to change BP meds.   Pt is taking just Ibuprofen and tylenol for pain and would like to decrease her dosage.   The following portions of the patient's history were reviewed and updated as appropriate: allergies, current medications, past family history, past medical history, past social history, past surgical history and problem list.    Past Medical History:   Diagnosis Date   • Endometrial cancer (CMS/HCC) 2018   • Essential hypertension 2018      Past Surgical History:   Procedure Laterality Date   • D&C HYSTEROSCOPY MYOSURE  2018   • TOTAL LAPAROSCOPIC HYSTERECTOMY WITH DAVINCI ROBOT  2018    laproscopic with BSO, lymph node dissection, left parametrectomy, lysis ureters, Dr Jaffe. Cancer   • TUBAL ABDOMINAL LIGATION        Family History   Problem Relation Age of Onset   • Breast cancer Cousin 55        50's   • Arthritis Mother    • Heart attack Mother    • Cancer Father         prostate   • Heart attack Father    • Hypertension Father    • Stroke Father    • Diabetes Sister         type 1   • Diabetes Brother         type 1   • Stroke Paternal Grandmother    • Ovarian cancer Neg Hx       Social History     Socioeconomic History   • Marital status:      Spouse name: Not on file   • Number of children: Not on file   • Years of education: Not on file   • Highest education level: Not on file   Social Needs   • Financial resource strain: Not on file   • Food insecurity - worry: Not on file   • Food insecurity - inability: Not on file   • Transportation needs - medical: Not on file   • Transportation needs - non-medical: Not on file   Occupational History   • Not on file   Tobacco Use   • Smoking status: Former Smoker     Packs/day: 0.75     Years: 12.00     Pack years: 9.00     Types: Cigarettes     Last attempt to quit:      Years since quittin.9   • Smokeless tobacco: Never Used    Substance and Sexual Activity   • Alcohol use: No   • Drug use: No   • Sexual activity: Yes     Partners: Male   Other Topics Concern   • Not on file   Social History Narrative   • Not on file        Review of Systems   Constitutional: Negative.  Negative for chills, diaphoresis, fatigue, fever and malaise/fatigue.   HENT: Negative.  Negative for ear pain, nosebleeds, postnasal drip, rhinorrhea, sinus pressure, sneezing and sore throat.    Eyes: Negative.  Negative for blurred vision, redness and itching.   Respiratory: Negative.  Negative for cough, shortness of breath and wheezing.    Cardiovascular: Negative.  Negative for chest pain, palpitations, orthopnea and PND.   Gastrointestinal: Positive for abdominal pain (post surgical mild) and diarrhea. Negative for constipation, nausea and vomiting.   Endocrine: Negative.  Negative for cold intolerance and heat intolerance.   Genitourinary: Positive for dysuria and frequency (this am). Negative for hematuria and urgency.   Musculoskeletal: Negative.  Negative for arthralgias, back pain and neck pain.   Skin: Negative.  Negative for color change and rash.   Allergic/Immunologic: Negative.  Negative for environmental allergies.   Neurological: Positive for dizziness. Negative for syncope, light-headedness and headaches.   Hematological: Negative.  Negative for adenopathy. Does not bruise/bleed easily.   Psychiatric/Behavioral: Negative.  Negative for dysphoric mood. The patient is not nervous/anxious.        Objective   Physical Exam   Constitutional: She is oriented to person, place, and time. She appears well-developed.   HENT:   Head: Normocephalic.   Right Ear: External ear normal.   Left Ear: External ear normal.   Nose: Nose normal.   Eyes: Conjunctivae, EOM and lids are normal. Pupils are equal, round, and reactive to light.   Neck: Trachea normal and normal range of motion. Neck supple. Carotid bruit is not present. No thyroid mass and no thyromegaly present.    Cardiovascular: Normal rate and regular rhythm.   No murmur heard.  Pulmonary/Chest: Effort normal and breath sounds normal. No respiratory distress. She has no decreased breath sounds. She has no wheezes. She has no rhonchi. She has no rales. She exhibits no tenderness.   Abdominal: Soft. Bowel sounds are normal. There is no tenderness.       Musculoskeletal: Normal range of motion.   Neurological: She is alert and oriented to person, place, and time.   Skin: Skin is warm and dry.   Psychiatric: She has a normal mood and affect. Her behavior is normal.   Nursing note and vitals reviewed.      Assessment/Plan   Tiarra was seen today for transitional care management.    Diagnoses and all orders for this visit:    Endometrial cancer (CMS/HCC)    Essential hypertension  -     losartan (COZAAR) 25 MG tablet; Take 1 tablet by mouth Daily.  -     Comprehensive Metabolic Panel; Future  -     Lipid Panel; Future  -     TSH; Future  -     CBC & Differential; Future    Malignant neoplasm of endocervix (CMS/HCC)    Dysuria  -     POC Urinalysis Dipstick, Automated  -     Urine Culture - Urine, Urine, Clean Catch    High blood triglycerides  -     Comprehensive Metabolic Panel; Future  -     Lipid Panel; Future    Abnormal urinalysis  -     Urine Culture - Urine, Urine, Clean Catch    Other orders  -     nitrofurantoin, macrocrystal-monohydrate, (MACROBID) 100 MG capsule; Take 1 capsule by mouth 2 (Two) Times a Day.     change lisinopril to losartan.   Pt's and 's questions were answered    Current Outpatient Medications:   •  acetaminophen (TYLENOL) 325 MG tablet, Take 2 tablets by mouth Every 6 (Six) Hours As Needed for Mild Pain ., Disp: 60 tablet, Rfl: 0  •  cetirizine (ZyrTEC) 10 MG tablet, Take 10 mg by mouth Every Other Day., Disp: , Rfl:   •  Cholecalciferol (VITAMIN D-3 PO), Take 2,000 Units by mouth Daily., Disp: , Rfl:   •  docusate sodium (COLACE) 250 MG capsule, Take 1 capsule by mouth 2 (Two) Times a Day.,  Disp: 60 capsule, Rfl: 3  •  ibuprofen (ADVIL,MOTRIN) 600 MG tablet, Take 1 tablet by mouth Every 6 (Six) Hours., Disp: 30 tablet, Rfl: 2  •  ondansetron (ZOFRAN) 4 MG tablet, Take 1 tablet by mouth Every 6 (Six) Hours As Needed for Nausea or Vomiting., Disp: 10 tablet, Rfl: 0  •  oxyCODONE (ROXICODONE) 5 MG immediate release tablet, Take 1-2 tablets by mouth every 4-6 hours as needed for pain, Disp: 10 tablet, Rfl: 0  •  losartan (COZAAR) 25 MG tablet, Take 1 tablet by mouth Daily., Disp: 30 tablet, Rfl: 1  •  nitrofurantoin, macrocrystal-monohydrate, (MACROBID) 100 MG capsule, Take 1 capsule by mouth 2 (Two) Times a Day., Disp: 20 capsule, Rfl: 0        Return in about 4 weeks (around 1/4/2019), or if symptoms worsen or fail to improve, for Recheck BP with nurse 1-2 weeks.    Recent Results (from the past 168 hour(s))   CBC (No Diff)    Collection Time: 12/01/18  5:31 AM   Result Value Ref Range    WBC 8.36 3.50 - 10.80 10*3/mm3    RBC 4.11 3.89 - 5.14 10*6/mm3    Hemoglobin 10.7 (L) 11.5 - 15.5 g/dL    Hematocrit 33.6 (L) 34.5 - 44.0 %    MCV 81.8 80.0 - 99.0 fL    MCH 26.0 (L) 27.0 - 31.0 pg    MCHC 31.8 (L) 32.0 - 36.0 g/dL    RDW 13.0 11.3 - 14.5 %    RDW-SD 39.3 37.0 - 54.0 fl    MPV 10.2 6.0 - 12.0 fL    Platelets 219 150 - 450 10*3/mm3   Basic Metabolic Panel    Collection Time: 12/01/18  5:31 AM   Result Value Ref Range    Glucose 99 70 - 100 mg/dL    BUN 8 (L) 9 - 23 mg/dL    Creatinine 0.68 0.60 - 1.30 mg/dL    Sodium 135 132 - 146 mmol/L    Potassium 3.8 3.5 - 5.5 mmol/L    Chloride 100 99 - 109 mmol/L    CO2 30.0 20.0 - 31.0 mmol/L    Calcium 8.5 (L) 8.7 - 10.4 mg/dL    eGFR Non African Amer 90 >60 mL/min/1.73    BUN/Creatinine Ratio 11.8 7.0 - 25.0    Anion Gap 5.0 3.0 - 11.0 mmol/L   POC Urinalysis Dipstick, Automated    Collection Time: 12/07/18 12:06 PM   Result Value Ref Range    Color Yellow Yellow, Straw, Dark Yellow, Sushila    Clarity, UA Cloudy (A) Clear    Specific Gravity  1.020 1.005 - 1.030     pH, Urine 6.0 5.0 - 8.0    Leukocytes Small (1+) (A) Negative    Nitrite, UA Negative Negative    Protein, POC Negative Negative mg/dL    Glucose, UA Negative Negative, 1000 mg/dL (3+) mg/dL    Ketones, UA Negative Negative    Urobilinogen, UA Normal Normal    Bilirubin Negative Negative    Blood, UA Trace (A) Negative

## 2018-12-18 ENCOUNTER — TELEPHONE (OUTPATIENT)
Dept: GYNECOLOGIC ONCOLOGY | Facility: CLINIC | Age: 53
End: 2018-12-18

## 2018-12-18 NOTE — TELEPHONE ENCOUNTER
I called pt to discuss pathology.  Endometrial cancer - st IB    Will need EBRT + vaginal brachy therapy.      I called pt to notify her and left VM.  She can call to discuss.   I encouraged post op visit 12/19 or 12/20 so we can discuss face to face.    Patient called back and we discussed.  F/U 12/19.

## 2018-12-19 ENCOUNTER — OFFICE VISIT (OUTPATIENT)
Dept: GYNECOLOGIC ONCOLOGY | Facility: CLINIC | Age: 53
End: 2018-12-19

## 2018-12-19 VITALS
SYSTOLIC BLOOD PRESSURE: 166 MMHG | TEMPERATURE: 98.3 F | RESPIRATION RATE: 18 BRPM | HEART RATE: 84 BPM | BODY MASS INDEX: 37.9 KG/M2 | OXYGEN SATURATION: 98 % | WEIGHT: 242 LBS | DIASTOLIC BLOOD PRESSURE: 77 MMHG

## 2018-12-19 DIAGNOSIS — C54.1 ENDOMETRIAL CANCER (HCC): Primary | ICD-10-CM

## 2018-12-19 PROCEDURE — 99213 OFFICE O/P EST LOW 20 MIN: CPT | Performed by: OBSTETRICS & GYNECOLOGY

## 2018-12-19 NOTE — PROGRESS NOTES
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 woman who presents for a post operative evaluation status post TOTAL LAPAROSCOPIC HYSTERECTOMY WITH DAVINCI ROBOT, BILATERAL SALPINGO OOPHORECTOMY, WITH RADICAL LEFT PARAMETRECTOMY, LEFT UTERETAL LYSIS, PELVIC LYMPH NODE SAMPLING performed on November 30, 2018.      Surgery and hospital course were uncomplicated.  Today, patient notes normal bowel and bladder function.  Her pain is well controlled. She has questions about resuming normal activities.     Past Medical History, Past Surgical History, Social History, Family History have been reviewed and are without significant changes except as mentioned.    Review of Systems   All other systems were reviewed and are negative except as mentioned above.    Medications:  The current medication list was reviewed in the EMR    ALLERGIES:  No Known Allergies        /77   Pulse 84   Temp 98.3 °F (36.8 °C) (Temporal)   Resp 18   Wt 110 kg (242 lb)   LMP 01/02/2018   SpO2 98%   BMI 37.90 kg/m²        Physical Exam  Constitutional:  Patient is a pleasant woman in no acute distress.  Gastrointestinal: Abdomen is soft and appropriately tender.  There is no mass palpated.  There is no rebound or guarding.  Incisions is clean, dry and intact.  Extremities:  Bilateral lower extremities are non-tender.  Gynecologic:GYNECOLOGIC:  External genitalia are free from lesion. On speculum examination, the vaginal cuff was intact and no lesions were appreciated.  On bimanual examination, no fullness was appreciated.  Uterus, cervix and adnexa were absent.  There was no significant tenderness.  Rectovaginal exam was deferred.      PATHOLOGY:  The working history is endometrial cancer.   Final Diagnosis    1. UTERUS WITH  CERVIX, BILATERAL TUBES AND OVARIES:  Infiltrating endometrioid adenocarcinoma, moderate to poorly differentiated with primary involvement of lower uterine segment.  Tumor size 3.0 x 3.0 x 2.0 cm.  1.1 cm of invasion with 1.4 cm of total myometrial thickness.  Focal endometrial involvement with exophytic tumor measuring 0.6 x 0.3 x 0.3 cm.  Myometrial adenomyosis and small leiomyoma.  Chronic cervicitis.  Right parasalpingeal and left ovarian endometriosis.  Ovarian physiologic changes right and left fallopian tube prior ligation.  Right and left parametrial tissues negative for tumor.   2. POSTERIOR CUL-DE-SAC BIOPSIES:  Endometriosis, no tumor seen.   3. LEFT PARAMETRIUM:  Fibrovascular connective tissue, no tumor seen   4. LEFT PARAMETRIAL LYMPH NODE, EXCISION:  Lymph nodes x2 mild sinus histiocytosis, no granulomas or metastatic tumor identified.   5. LEFT PELVIC LYMPH NODE, EXCISION:  Lymph nodes x 5, no evidence of metastatic tumor, benign endosalpingiotic rest involving one lymph node.   6. ADDITIONAL POSTERIOR VAGINAL TISSUE:  Focal endometriosis, no tumor identified.           UTERUS ENDOMETRIUM TEMPLATE:  SPECIMEN TYPE/ PROCEDURE:  Hysterectomy and bilateral salpingo-oophorectomy.  LYMPH NODE SAMPLING:  Yes.  SPECIMEN INTEGRITY:   Intact.  TUMOR SIZE (greatest dimension):  3.0 x 3.0 x 2.0 cm.  HISTOLOGIC TYPE:  Endometrioid adenocarcinoma.  HISTOLOGIC GRADE:  Predominately grade 2 with small focus of grade 3 identified.  MYOMETRIAL INVASION (Present/Not identified):  Present.               DEPTH OF INVASION (mm): 11 mm.               MYOMETRIAL THICKNESS (mm): 14 mm.               PERCENTAGE OF MYOMETRIAL INVASION: Greater than 50%.  INVOLVEMENT OF CERVICAL STROMA:  Absent.  EXTENT OF INVOLVEMENT OF OTHER TISSUE/ORGANS:  None.  UTERINE SEROSA INVOLVEMENT: Absent.  MARGINS:  Negative.  PELVIC LYMPH NODES (SUBMITTED/NONE): Left parametrial lymph node and left pelvic lymph node, total 7 lymph nodes.  NUMBER  "OF PELVIC LYMPH NODES EXAMINED: 7.  NUMBER OF PELVIC SENTINEL NODES EXAMINED: 0.  LATERALITY OF PELVIC LYMPH NODES EXAMINED: Left.  NUMBER OF PELVIC LYMPH NODES WITH MACROMETASTASIS: 0.  NUMBER OF PELVIC LYMPH NODES WITH MICROMETASTASIS: 0.  NUMBER OF PELVIC LYMPH NODES WITH ITC’S: 0.  LATERALITY OF PELVIC NODES WITH TUMOR: Not applicable.  PARA-AORTIC LYMPH NODES (SUBMITTED/NONE): None.  NUMBER OF PARA-AORTIC LYMPH NODES EXAMINED: None.  LATERALITY OF PARA-AORTIC LYMPH NODES EXAMINED: Not applicable.  NUMBER OF PARA-AORTIC LYMPH NODES EXAMINED: none submitted  NUMBER OF PARA-AORTIC LYMPH NODES WITH MACROMETASTASIS: Not applicable.  NUMBER OF PARA-AORTIC LYMPH NODES WITH MICROMETASTASIS: Not applicable.  NUMBER OF PARA-AORTIC LYMPH NODES WITH ITC’S: Not applicable.  LATERALITY OF PARA-AORTIC LYMPH NODES WITH TUMOR: Not applicable.  LYMPHVASCULAR INVASION:  Not identified.  MSI TESTING (under age 60):   Pending.    ADDITIONAL PATHOLOGIC FINDINGS:  Chronic cervicitis, leiomyoma, multi-focal endometriosis.  ANCILLARY STUDIES:  Available on request  AJCC PATHOLOGIC STAGE:  (COMPLETED BY PATHOLOGIST, BASED ONLY ON TISSUE FINDINGS, MORE EXTENSIVE DISEASE MAY NOT BE KNOWN TO THE PATHOLOGIST)  pT=  1-b.  pN=    0.  AJCC PATHOLOGIC STAGE:  I-B.     DGD/dlb         Electronically signed by Keon Torre MD on 12/18/2018 at 1006   Intraoperative Consultation    Frozen section: Verbal report given to Dr. Fuller in person in Froz section suite on 11/30/2018 at 9:48 AM.  FROZEN SECTION DIAGNOSIS: Spec\"A\" - uterus tubes and ovaries  FS1a - endometrioid adenocarcinoma and complex atyp hyperplasia in endometrium of lower uterine segment  FS1b - concentric infiltrating adenocarcinoma of endocervical region, appears different than endometrioid adenocarcinoma present in FS1a. (DGD/PCC)       ASSESSMENT/PLAN:  Tiarra Becerra returns for a post-operative evaluation today.  All pathology reports were given to patient.      Overall, the " patient is very pleased with her care.  I recommended continuation of post operative precautions as discussed.     Regarding endometrial cancer, I recommend vaginal brachytherapy and/or external beam radiation therapy.  The reasons we discussed regarding consideration of EBRT included deep myometrial invasion at the lower uterine segment, multifocal disease, and focus of grade 3 cancer.  NCCN guidelines were reviewed with patient and her  and all questions were answered.    CT was ordered and referral was made to radiation oncology.  Will present at tumor board in near future.    I personally spent 15 minutes face-to-face with the patient of which >50% was spent performing counseling/coordiantion of care.    Note: Speech recognition transcription software was used to dictate portions of this document.  An attempt at proofreading has been made though minor errors in transcription may still be present.  Please do not hesitate to call our office with any questions.    April Jaffe MD

## 2018-12-21 ENCOUNTER — APPOINTMENT (OUTPATIENT)
Dept: CT IMAGING | Facility: HOSPITAL | Age: 53
End: 2018-12-21
Attending: OBSTETRICS & GYNECOLOGY

## 2018-12-21 ENCOUNTER — TELEPHONE (OUTPATIENT)
Dept: GYNECOLOGIC ONCOLOGY | Facility: CLINIC | Age: 53
End: 2018-12-21

## 2018-12-21 ENCOUNTER — CLINICAL SUPPORT (OUTPATIENT)
Dept: INTERNAL MEDICINE | Facility: CLINIC | Age: 53
End: 2018-12-21

## 2018-12-21 ENCOUNTER — TELEPHONE (OUTPATIENT)
Dept: INTERNAL MEDICINE | Facility: CLINIC | Age: 53
End: 2018-12-21

## 2018-12-21 ENCOUNTER — DOCUMENTATION (OUTPATIENT)
Dept: GYNECOLOGIC ONCOLOGY | Facility: CLINIC | Age: 53
End: 2018-12-21

## 2018-12-21 VITALS — SYSTOLIC BLOOD PRESSURE: 120 MMHG | DIASTOLIC BLOOD PRESSURE: 80 MMHG

## 2018-12-21 DIAGNOSIS — E78.1 HIGH BLOOD TRIGLYCERIDES: ICD-10-CM

## 2018-12-21 DIAGNOSIS — I10 ESSENTIAL HYPERTENSION: ICD-10-CM

## 2018-12-21 LAB
ALBUMIN SERPL-MCNC: 4.45 G/DL (ref 3.2–4.8)
ALBUMIN/GLOB SERPL: 2.3 G/DL (ref 1.5–2.5)
ALP SERPL-CCNC: 94 U/L (ref 25–100)
ALT SERPL W P-5'-P-CCNC: 23 U/L (ref 7–40)
ANION GAP SERPL CALCULATED.3IONS-SCNC: 8 MMOL/L (ref 3–11)
ARTICHOKE IGE QN: 90 MG/DL (ref 0–130)
AST SERPL-CCNC: 20 U/L (ref 0–33)
BASOPHILS # BLD AUTO: 0.07 10*3/MM3 (ref 0–0.2)
BASOPHILS NFR BLD AUTO: 0.9 % (ref 0–1)
BILIRUB SERPL-MCNC: 0.4 MG/DL (ref 0.3–1.2)
BUN BLD-MCNC: 12 MG/DL (ref 9–23)
BUN/CREAT SERPL: 17.4 (ref 7–25)
CALCIUM SPEC-SCNC: 9.2 MG/DL (ref 8.7–10.4)
CHLORIDE SERPL-SCNC: 103 MMOL/L (ref 99–109)
CHOLEST SERPL-MCNC: 235 MG/DL (ref 0–200)
CO2 SERPL-SCNC: 28 MMOL/L (ref 20–31)
CREAT BLD-MCNC: 0.69 MG/DL (ref 0.6–1.3)
DEPRECATED RDW RBC AUTO: 38.2 FL (ref 37–54)
EOSINOPHIL # BLD AUTO: 0.83 10*3/MM3 (ref 0–0.3)
EOSINOPHIL NFR BLD AUTO: 11 % (ref 0–3)
ERYTHROCYTE [DISTWIDTH] IN BLOOD BY AUTOMATED COUNT: 13.2 % (ref 11.3–14.5)
GFR SERPL CREATININE-BSD FRML MDRD: 89 ML/MIN/1.73
GLOBULIN UR ELPH-MCNC: 2 GM/DL
GLUCOSE BLD-MCNC: 91 MG/DL (ref 70–100)
HCT VFR BLD AUTO: 37.7 % (ref 34.5–44)
HDLC SERPL-MCNC: 65 MG/DL (ref 40–60)
HGB BLD-MCNC: 11.6 G/DL (ref 11.5–15.5)
IMM GRANULOCYTES # BLD AUTO: 0.01 10*3/MM3 (ref 0–0.03)
IMM GRANULOCYTES NFR BLD AUTO: 0.1 % (ref 0–0.6)
LYMPHOCYTES # BLD AUTO: 1.72 10*3/MM3 (ref 0.6–4.8)
LYMPHOCYTES NFR BLD AUTO: 22.9 % (ref 24–44)
MCH RBC QN AUTO: 24.7 PG (ref 27–31)
MCHC RBC AUTO-ENTMCNC: 30.8 G/DL (ref 32–36)
MCV RBC AUTO: 80.4 FL (ref 80–99)
MONOCYTES # BLD AUTO: 0.42 10*3/MM3 (ref 0–1)
MONOCYTES NFR BLD AUTO: 5.6 % (ref 0–12)
NEUTROPHILS # BLD AUTO: 4.48 10*3/MM3 (ref 1.5–8.3)
NEUTROPHILS NFR BLD AUTO: 59.6 % (ref 41–71)
PLATELET # BLD AUTO: 288 10*3/MM3 (ref 150–450)
PMV BLD AUTO: 10.5 FL (ref 6–12)
POTASSIUM BLD-SCNC: 4.5 MMOL/L (ref 3.5–5.5)
PROT SERPL-MCNC: 6.4 G/DL (ref 5.7–8.2)
RBC # BLD AUTO: 4.69 10*6/MM3 (ref 3.89–5.14)
SODIUM BLD-SCNC: 139 MMOL/L (ref 132–146)
TRIGL SERPL-MCNC: 214 MG/DL (ref 0–150)
TSH SERPL DL<=0.05 MIU/L-ACNC: 1.64 MIU/ML (ref 0.35–5.35)
WBC NRBC COR # BLD: 7.52 10*3/MM3 (ref 3.5–10.8)

## 2018-12-21 PROCEDURE — 84443 ASSAY THYROID STIM HORMONE: CPT | Performed by: FAMILY MEDICINE

## 2018-12-21 PROCEDURE — 80053 COMPREHEN METABOLIC PANEL: CPT | Performed by: FAMILY MEDICINE

## 2018-12-21 PROCEDURE — 85025 COMPLETE CBC W/AUTO DIFF WBC: CPT | Performed by: FAMILY MEDICINE

## 2018-12-21 PROCEDURE — 87086 URINE CULTURE/COLONY COUNT: CPT | Performed by: FAMILY MEDICINE

## 2018-12-21 PROCEDURE — 80061 LIPID PANEL: CPT | Performed by: FAMILY MEDICINE

## 2018-12-21 NOTE — TELEPHONE ENCOUNTER
----- Message from April Jaffe MD sent at 12/21/2018  7:20 AM EST -----  Please notify pt that her case was present at Tumor Board and the plan for EBRT + vaginal brachytherapy was agreed upon.  Dr. Vincent, the radiation oncologist she is scheduled to see, was present for the discussion.    12/20/18:  I called to inform pt of the above. Call went to voicemail.  I left message for her to call me back along with office hours.     3:55pm:  I tried pt's home and cell phone with no answer on either again.  I left another message for her to return call.

## 2018-12-21 NOTE — PROGRESS NOTES
I briefly presented pt at Tumor Board today.  Path and planned CT (not performed yet) were discussed.  Dr. Vincent present and agrees with plan for EBRT + vaginal brachy.  Will have staff notify pt.

## 2018-12-23 LAB — BACTERIA SPEC AEROBE CULT: NORMAL

## 2018-12-24 ENCOUNTER — TELEPHONE (OUTPATIENT)
Dept: GYNECOLOGIC ONCOLOGY | Facility: CLINIC | Age: 53
End: 2018-12-24

## 2018-12-24 NOTE — TELEPHONE ENCOUNTER
----- Message from Maia Welch sent at 12/24/2018  9:10 AM EST -----  Regarding: Elida Herrera returning Ananya's phone call   Contact: [phone]  Patient called said that she is returning Ananya's call from last week. Said she had a CT scan and was wondering if she was calling her for that.

## 2018-12-26 ENCOUNTER — HOSPITAL ENCOUNTER (OUTPATIENT)
Dept: CT IMAGING | Facility: HOSPITAL | Age: 53
Discharge: HOME | End: 2018-12-26
Attending: OBSTETRICS & GYNECOLOGY | Admitting: OBSTETRICS & GYNECOLOGY

## 2018-12-26 DIAGNOSIS — C54.1 ENDOMETRIAL CANCER (HCC): ICD-10-CM

## 2018-12-26 LAB
CYTO UR: NORMAL
LAB AP CASE REPORT: NORMAL
LAB AP CLINICAL INFORMATION: NORMAL
LAB AP SPECIAL STAINS: NORMAL
Lab: NORMAL
PATH REPORT.FINAL DX SPEC: NORMAL
PATH REPORT.GROSS SPEC: NORMAL

## 2018-12-26 PROCEDURE — 74177 CT ABD & PELVIS W/CONTRAST: CPT

## 2018-12-26 PROCEDURE — 25010000002 IOPAMIDOL 61 % SOLUTION: Performed by: OBSTETRICS & GYNECOLOGY

## 2018-12-26 RX ADMIN — IOPAMIDOL 90 ML: 612 INJECTION, SOLUTION INTRAVENOUS at 10:45

## 2018-12-26 RX ADMIN — BARIUM SULFATE 450 ML: 21 SUSPENSION ORAL at 09:15

## 2018-12-27 ENCOUNTER — TELEPHONE (OUTPATIENT)
Dept: GYNECOLOGIC ONCOLOGY | Facility: CLINIC | Age: 53
End: 2018-12-27

## 2018-12-27 NOTE — TELEPHONE ENCOUNTER
April Jaffe MD  [facility]             pls notify pt of negative CT - small LT lymphocyst, minimal post op changes    Previous Messages         Phoned pt, left message to call.  Pt returned call, informed her of results, pt v/u.

## 2018-12-31 ENCOUNTER — OFFICE VISIT (OUTPATIENT)
Dept: RADIATION ONCOLOGY | Facility: HOSPITAL | Age: 53
End: 2018-12-31

## 2018-12-31 ENCOUNTER — HOSPITAL ENCOUNTER (OUTPATIENT)
Dept: RADIATION ONCOLOGY | Facility: HOSPITAL | Age: 53
Setting detail: RADIATION/ONCOLOGY SERIES
Discharge: HOME | End: 2018-12-31

## 2018-12-31 VITALS
TEMPERATURE: 97.7 F | OXYGEN SATURATION: 98 % | SYSTOLIC BLOOD PRESSURE: 147 MMHG | WEIGHT: 240.5 LBS | BODY MASS INDEX: 37.67 KG/M2 | DIASTOLIC BLOOD PRESSURE: 100 MMHG | RESPIRATION RATE: 18 BRPM | HEART RATE: 84 BPM

## 2018-12-31 DIAGNOSIS — C54.1 ENDOMETRIAL CANCER (HCC): ICD-10-CM

## 2018-12-31 PROCEDURE — G0463 HOSPITAL OUTPT CLINIC VISIT: HCPCS | Performed by: RADIOLOGY

## 2018-12-31 NOTE — PROGRESS NOTES
CONSULTATION NOTE      :                                                          1965  DATE OF CONSULTATION:                       2018   REQUESTING PHYSICIAN:                   April Jaffe MD  REASON FOR CONSULTATION:            Cancer Staging  Endometrial cancer (CMS/[facility])  Staging form: Corpus Uteri - Carcinoma And Carcinosarcoma, AJCC 8th Edition  - Clinical stage from 2018: FIGO Stage I (cT1, cN0, cM0) - Signed by April Jaffe MD on 2018    Malignant neoplasm of endocervix (CMS/HCC)  Staging form: Cervix Uteri, AJCC 8th Edition  - Clinical stage from 2018: FIGO Stage IIB (cT2b, cN0, cM0) - Signed by April Jaffe MD on 2018    Thank you for requesting my services in evaluation of this pleasant individual.  I am seeing them in outpatient consultation regarding a diagnosis of endometrial cancer.     BRIEF HISTORY:  The patient is a very pleasant 53 y.o. female  with past medical history significant for hypertension who recently presented with abnormal uterine bleeding.  She had been amenorrheic since spring of this year, and when she developed bleeding this was concerning so she presented to her local gynecologist.  She subsequently had a   TVUS performed which showed a large polyp/fibroid in the endocervical canal measuring 3.8 x 3.4 x 3.8 cm.  A biopsy was performed which identified a grade 1 endometrioid adenocarcinoma arising in the setting of complex atypical hyperplasia.  She was then taken to the operating room on 2018, and underwent a total laparoscopic hysterectomy, bilateral salpingo-oophorectomy, and at the time of her surgery was found to have multifocal disease within the uterus with fragments of grade 3 carcinoma.  There is concern for multiple peritoneal deposits as well as left parametrial involvement therefore she underwent further staging and a left parametrectomy.  The final pathology revealed a grade 3 endometrioid  adenocarcinoma with multifocal disease within the myometrium and invasion into the outer one third there was no evidence of lymphovascular space invasion and instead of having peritoneal deposits of carcinoma, it was found that she had multifocal endometriosis the cervix was uninvolved as well as the left parametria.  Her case was discussed at the multidisciplinary tumor board and given the multifocal nature, the likelihood that this carcinoma is arising in the setting of endometriosis, grade 3 histology, and sandra[ myometrial invasion, it was recommended that she receive both adjuvant external radiation followed by intracavitary brachytherapy.  She is therefore presenting to my clinic today for discussion related to radiation therapy.  From a symptomatic standpoint, she is very pleased and reports that she is recovered well from her surgery.  She denies any difficulties with bowel, bladder, or other gynecologic complaints.    OBGYN History:  She is a .  She did use HRT (Estrace/Progesterone), but this has been discontinued. She does have a history of abnormal pap smears.  Total Laparoscopic Hysterectomy 2018        No Known Allergies    Social History     Socioeconomic History   • Marital status:      Spouse name: Not on file   • Number of children: Not on file   • Years of education: Not on file   • Highest education level: Not on file   Tobacco Use   • Smoking status: Former Smoker     Packs/day: 0.75     Years: 12.00     Pack years: 9.00     Types: Cigarettes     Last attempt to quit:      Years since quittin.0   • Smokeless tobacco: Never Used   Substance and Sexual Activity   • Alcohol use: No   • Drug use: No   • Sexual activity: Yes     Partners: Male       Past Medical History:   Diagnosis Date   • Endometrial cancer (CMS/HCC) 2018   • Essential hypertension 2018       family history includes Arthritis in her mother; Breast cancer (age of onset: 55) in her cousin; Cancer  in her father; Diabetes in her brother and sister; Heart attack in her father and mother; Hypertension in her father; Stroke in her father and paternal grandmother.     Past Surgical History:   Procedure Laterality Date   • D&C HYSTEROSCOPY MYOSURE  11/05/2018   • TOTAL LAPAROSCOPIC HYSTERECTOMY WITH DAVINCI ROBOT  11/30/2018    laproscopic with BSO, lymph node dissection, left parametrectomy, lysis ureters, Dr Jaffe. Cancer   • TOTAL LAPAROSCOPIC HYSTERECTOMY, LAPAROSCOPIC NODE DISSECTION N/A 11/30/2018    Procedure: TOTAL LAPAROSCOPIC HYSTERECTOMY WITH DAVINCI ROBOT, BILATERAL SALPINGO OOPHORECTOMY, WITH RADICAL LEFT PARAMETRECTOMY, BILATERAL UTERETAL LYSIS, PELVIC LYMPH NODE DISSECTION;  Surgeon: April Jaffe MD;  Location: [facility];  Service: DaVinc   • TUBAL ABDOMINAL LIGATION  2006        Review of Systems   Constitutional: Positive for fatigue.   HENT:   Positive for tinnitus (reports for 2 years ).    Endocrine: Positive for hot flashes.   Musculoskeletal: Positive for arthralgias and back pain.   Psychiatric/Behavioral: Positive for sleep disturbance.   All other systems reviewed and are negative.          Objective   VITAL SIGNS:   Vitals:    12/31/18 1114   BP: 147/100   Pulse: 84   Resp: 18   Temp: 97.7 °F (36.5 °C)   TempSrc: Temporal   SpO2: 98%   Weight: 109 kg (240 lb 8 oz)   PainSc: 0-No pain        Karnofsky score: 90        Physical Exam   Constitutional: She is oriented to person, place, and time. She appears well-developed and well-nourished. No distress.   obese   HENT:   Head: Normocephalic and atraumatic.   Mouth/Throat: Oropharynx is clear and moist.   Eyes: Conjunctivae and EOM are normal. Pupils are equal, round, and reactive to light.   Neck: Normal range of motion. Neck supple.   Cardiovascular: Normal rate and regular rhythm. Exam reveals no friction rub.   No murmur heard.  Pulmonary/Chest: Effort normal and breath sounds normal. She has no wheezes.   Abdominal: Soft. Bowel  sounds are normal. She exhibits no distension and no mass. There is no tenderness.   Multiple laparoscopic incisions which are well-healed   Genitourinary:   Genitourinary Comments: Well-healed vaginal cuff without abnormality present   Musculoskeletal: Normal range of motion. She exhibits no edema.   Lymphadenopathy:     She has no cervical adenopathy.   Neurological: She is alert and oriented to person, place, and time.   Skin: Skin is warm and dry.   Psychiatric: She has a normal mood and affect. Her behavior is normal. Judgment and thought content normal.   Nursing note and vitals reviewed.    IMAGING  I have personally reviewed the relevant imaging studies, as follows:  Xr Chest 2 View    Result Date: 11/28/2018  No acute cardiopulmonary process.  D:  11/28/2018 E:  11/28/2018  This report was finalized on 11/28/2018 2:08 PM by Dr. Shamir Hopson.      Ct Abdomen Pelvis With Contrast    Result Date: 12/26/2018  Cysts identified in the left adnexa suggesting an ovarian cyst. There is a small amount of fluid seen near the left vaginal vault. There are postsurgical changes seen within the pelvis with minimal physiologic free fluid seen within the pelvis. Remainder of the CT abdomen and pelvis is grossly unremarkable.  D:  12/26/2018 E:  12/26/2018   This report was finalized on 12/26/2018 3:55 PM by Dr. Lay Gibson MD.      PATHOLOGY  Total laparoscopic hysterectomy, bilateral salpingo-oophorectomy, and staging (11/30/2018):  1. UTERUS WITH CERVIX, BILATERAL TUBES AND OVARIES:  Infiltrating endometrioid adenocarcinoma, moderate to poorly differentiated with primary involvement of lower uterine segment.  Tumor size 3.0 x 3.0 x 2.0 cm.  1.1 cm of invasion with 1.4 cm of total myometrial thickness.  Focal endometrial involvement with exophytic tumor measuring 0.6 x 0.3 x 0.3 cm.  Myometrial adenomyosis and small leiomyoma.  Chronic cervicitis.  Right parasalpingeal and left ovarian endometriosis.  Ovarian  physiologic changes right and left fallopian tube prior ligation.  Right and left parametrial tissues negative for tumor.   2. POSTERIOR CUL-DE-SAC BIOPSIES:  Endometriosis, no tumor seen.   3. LEFT PARAMETRIUM:  Fibrovascular connective tissue, no tumor seen   4.  RIGHT PELVIC LYMPH NODE, EXCISION:  Lymph nodes x2 mild sinus histiocytosis, no granulomas or metastatic tumor identified.   5. LEFT PELVIC LYMPH NODE, EXCISION:  Lymph nodes x 5, no evidence of metastatic tumor, benign endosalpingiotic rest involving one lymph node.   6. ADDITIONAL POSTERIOR VAGINAL TISSUE:  Focal endometriosis, no tumor identified.    Endometrial Biopsy (11/5/2018):  1. ENDOMETRIAL CURETTAGE:  Endometrioid adenocarcinoma, FIGO grade 1, arising in the setting of extensive complex atypical hyperplasia.  Focal areas suggestive of myoinvasion (see comment).  2. ENDOMETRIUM, MYOSURE:  Focal areas of endometrioid adenocarcinoma, FIGO grade 1, and associated fragments of complex atypical hyperplasia.  Focal myoinvasion present.  Portions of benign proliferative pattern endometrium and benign smooth muscle also present.         The following portions of the patient's history were reviewed and updated as appropriate: allergies, current medications, past family history, past medical history, past social history, past surgical history and problem list.    Assessment  Mrs. Becerra is a 53-year-old with a new diagnosis of a FIGO 1B grade 3 endometrioid adenocarcinoma of the uterus.  She has multiple risk factors including multifocal disease, grade 3 histology, deep myometrial invasion of 79%, and the concern of having multifocal endometriosis with multiple pelvic deposits and a carcinoma that could be arising from her endometriosis.  As mentioned, the recommendation from the multidisciplinary tumor board was for her to receive a concomitant course of pelvic radiation therapy followed by intracavitary brachytherapy, and I fully agree with these  recommendations and I was present at the tumor board during the discussion.  After spending approximately 45 minutes today with Mrs. Becerra discussing the risks, benefits, and logistics of treatment, she was agreeable and willing to undergo radiation therapy.  She signed informed consent today and I scheduled her to return to my clinic on Friday, January 4, 2019 in order to undergo a radiation setup and simulation session.  I plan to treat her pelvis including the operative bed and draining lymphatics to a dose of 45 gray in 25 fractions.  This will then be followed by 2 intracavitary brachytherapy cylinders delivering a dose of 6 Gray to the vaginal surface each.  I anticipate that we should be ready to begin treatment within the next 2 weeks pending insurance authorization.    RECOMMENDATIONS:      Return in about 4 days (around 1/4/2019) for Radiation Simulation.  Tiarra was seen today for uterine cancer.    Diagnoses and all orders for this visit:    Endometrial cancer (CMS/[facility])    Thank you for allowing me to participate in the care of this individual.    Sincerely,     Brennan Vincent MD

## 2019-01-04 ENCOUNTER — HOSPITAL ENCOUNTER (OUTPATIENT)
Dept: RADIATION ONCOLOGY | Facility: HOSPITAL | Age: 54
Discharge: HOME | End: 2019-01-04

## 2019-01-04 ENCOUNTER — HOSPITAL ENCOUNTER (OUTPATIENT)
Dept: RADIATION ONCOLOGY | Facility: HOSPITAL | Age: 54
Setting detail: RADIATION/ONCOLOGY SERIES
Discharge: HOME | End: 2019-01-04

## 2019-01-07 ENCOUNTER — APPOINTMENT (OUTPATIENT)
Dept: RADIATION ONCOLOGY | Facility: HOSPITAL | Age: 54
End: 2019-01-07

## 2019-01-10 PROCEDURE — 77338 DESIGN MLC DEVICE FOR IMRT: CPT | Performed by: RADIOLOGY

## 2019-01-10 PROCEDURE — 77300 RADIATION THERAPY DOSE PLAN: CPT | Performed by: RADIOLOGY

## 2019-01-10 PROCEDURE — 77301 RADIOTHERAPY DOSE PLAN IMRT: CPT | Performed by: RADIOLOGY

## 2019-01-14 ENCOUNTER — HOSPITAL ENCOUNTER (OUTPATIENT)
Dept: RADIATION ONCOLOGY | Facility: HOSPITAL | Age: 54
Discharge: HOME | End: 2019-01-14

## 2019-01-15 ENCOUNTER — HOSPITAL ENCOUNTER (OUTPATIENT)
Dept: RADIATION ONCOLOGY | Facility: HOSPITAL | Age: 54
Discharge: HOME | End: 2019-01-15

## 2019-01-15 PROCEDURE — 77386: CPT | Performed by: RADIOLOGY

## 2019-01-16 ENCOUNTER — HOSPITAL ENCOUNTER (OUTPATIENT)
Dept: RADIATION ONCOLOGY | Facility: HOSPITAL | Age: 54
Discharge: HOME | End: 2019-01-16

## 2019-01-16 VITALS — BODY MASS INDEX: 38.45 KG/M2 | WEIGHT: 245.5 LBS

## 2019-01-16 PROCEDURE — 77386: CPT | Performed by: RADIOLOGY

## 2019-01-17 ENCOUNTER — HOSPITAL ENCOUNTER (OUTPATIENT)
Dept: RADIATION ONCOLOGY | Facility: HOSPITAL | Age: 54
Discharge: HOME | End: 2019-01-17

## 2019-01-17 PROCEDURE — 77336 RADIATION PHYSICS CONSULT: CPT | Performed by: RADIOLOGY

## 2019-01-17 PROCEDURE — 77386: CPT | Performed by: RADIOLOGY

## 2019-01-18 ENCOUNTER — HOSPITAL ENCOUNTER (OUTPATIENT)
Dept: RADIATION ONCOLOGY | Facility: HOSPITAL | Age: 54
Discharge: HOME | End: 2019-01-18

## 2019-01-18 PROCEDURE — 77386: CPT | Performed by: RADIOLOGY

## 2019-01-21 ENCOUNTER — HOSPITAL ENCOUNTER (OUTPATIENT)
Dept: RADIATION ONCOLOGY | Facility: HOSPITAL | Age: 54
Discharge: HOME | End: 2019-01-21

## 2019-01-21 PROCEDURE — 77386: CPT | Performed by: RADIOLOGY

## 2019-01-22 ENCOUNTER — HOSPITAL ENCOUNTER (OUTPATIENT)
Dept: RADIATION ONCOLOGY | Facility: HOSPITAL | Age: 54
Discharge: HOME | End: 2019-01-22

## 2019-01-22 VITALS — BODY MASS INDEX: 38.97 KG/M2 | WEIGHT: 248.8 LBS

## 2019-01-22 DIAGNOSIS — G57.02 NEUROPATHY OF LEFT SCIATIC NERVE: Primary | ICD-10-CM

## 2019-01-22 PROCEDURE — 77386: CPT | Performed by: RADIOLOGY

## 2019-01-22 RX ORDER — GABAPENTIN 300 MG/1
300 CAPSULE ORAL NIGHTLY
Qty: 30 CAPSULE | Refills: 0 | Status: SHIPPED | OUTPATIENT
Start: 2019-01-22 | End: 2019-03-25 | Stop reason: SINTOL

## 2019-01-23 ENCOUNTER — HOSPITAL ENCOUNTER (OUTPATIENT)
Dept: RADIATION ONCOLOGY | Facility: HOSPITAL | Age: 54
Discharge: HOME | End: 2019-01-23

## 2019-01-23 PROCEDURE — 77386: CPT | Performed by: RADIOLOGY

## 2019-01-24 ENCOUNTER — HOSPITAL ENCOUNTER (OUTPATIENT)
Dept: RADIATION ONCOLOGY | Facility: HOSPITAL | Age: 54
Discharge: HOME | End: 2019-01-24

## 2019-01-24 PROCEDURE — 77386: CPT | Performed by: RADIOLOGY

## 2019-01-24 PROCEDURE — 77336 RADIATION PHYSICS CONSULT: CPT | Performed by: RADIOLOGY

## 2019-01-25 ENCOUNTER — HOSPITAL ENCOUNTER (OUTPATIENT)
Dept: RADIATION ONCOLOGY | Facility: HOSPITAL | Age: 54
Discharge: HOME | End: 2019-01-25

## 2019-01-25 PROCEDURE — 77386: CPT | Performed by: RADIOLOGY

## 2019-01-28 ENCOUNTER — HOSPITAL ENCOUNTER (OUTPATIENT)
Dept: RADIATION ONCOLOGY | Facility: HOSPITAL | Age: 54
Discharge: HOME | End: 2019-01-28

## 2019-01-28 PROCEDURE — 77386: CPT | Performed by: RADIOLOGY

## 2019-01-29 ENCOUNTER — HOSPITAL ENCOUNTER (OUTPATIENT)
Dept: RADIATION ONCOLOGY | Facility: HOSPITAL | Age: 54
Discharge: HOME | End: 2019-01-29

## 2019-01-29 VITALS — WEIGHT: 246.9 LBS | BODY MASS INDEX: 38.67 KG/M2

## 2019-01-29 PROCEDURE — 77386: CPT | Performed by: RADIOLOGY

## 2019-01-30 ENCOUNTER — HOSPITAL ENCOUNTER (OUTPATIENT)
Dept: RADIATION ONCOLOGY | Facility: HOSPITAL | Age: 54
Discharge: HOME | End: 2019-01-30

## 2019-01-30 ENCOUNTER — TELEPHONE (OUTPATIENT)
Dept: GYNECOLOGIC ONCOLOGY | Facility: CLINIC | Age: 54
End: 2019-01-30

## 2019-01-30 PROCEDURE — 77386: CPT | Performed by: RADIOLOGY

## 2019-01-30 NOTE — TELEPHONE ENCOUNTER
I returned patient's call. She is inquiring about her MSI by IHC testing results. This has returned and found to be negative. No additional testing or referral to genetic counseling is needed at this time. She is pleased to hear this. She is currently undergoing radiation with Dr. Vincent, due to complete treatment at the end of February. She is encouraged to notify us when she completes radiation so she may be scheduled for follow-up with Dr. Jaffe and to begin survivorship/surveillance.

## 2019-01-30 NOTE — TELEPHONE ENCOUNTER
----- Message from Pretty Daigle MA sent at 1/30/2019 10:11 AM EST -----  Regarding: FW: TATE-TEST RESULTS  Contact: [phone]      ----- Message -----  From: Cathy Stover  Sent: 1/30/2019   9:35 AM  To: [facility]  Subject: TATE-TEST RESULTS                            Patient called and she wants to know the genetic test results? Please call.

## 2019-01-31 ENCOUNTER — HOSPITAL ENCOUNTER (OUTPATIENT)
Dept: RADIATION ONCOLOGY | Facility: HOSPITAL | Age: 54
Discharge: HOME | End: 2019-01-31

## 2019-01-31 PROCEDURE — 77336 RADIATION PHYSICS CONSULT: CPT | Performed by: RADIOLOGY

## 2019-01-31 PROCEDURE — 77386: CPT | Performed by: RADIOLOGY

## 2019-02-01 ENCOUNTER — HOSPITAL ENCOUNTER (OUTPATIENT)
Dept: RADIATION ONCOLOGY | Facility: HOSPITAL | Age: 54
Discharge: HOME | End: 2019-02-01

## 2019-02-01 ENCOUNTER — HOSPITAL ENCOUNTER (OUTPATIENT)
Dept: RADIATION ONCOLOGY | Facility: HOSPITAL | Age: 54
Setting detail: RADIATION/ONCOLOGY SERIES
Discharge: HOME | End: 2019-02-01

## 2019-02-01 PROCEDURE — 77386: CPT | Performed by: RADIOLOGY

## 2019-02-04 ENCOUNTER — APPOINTMENT (OUTPATIENT)
Dept: RADIATION ONCOLOGY | Facility: HOSPITAL | Age: 54
End: 2019-02-04

## 2019-02-04 ENCOUNTER — HOSPITAL ENCOUNTER (OUTPATIENT)
Dept: RADIATION ONCOLOGY | Facility: HOSPITAL | Age: 54
Discharge: HOME | End: 2019-02-04

## 2019-02-04 PROCEDURE — 77386: CPT | Performed by: RADIOLOGY

## 2019-02-05 ENCOUNTER — HOSPITAL ENCOUNTER (OUTPATIENT)
Dept: RADIATION ONCOLOGY | Facility: HOSPITAL | Age: 54
Discharge: HOME | End: 2019-02-05

## 2019-02-05 VITALS — WEIGHT: 246.5 LBS | BODY MASS INDEX: 38.61 KG/M2

## 2019-02-05 PROCEDURE — 77386: CPT | Performed by: RADIOLOGY

## 2019-02-06 ENCOUNTER — HOSPITAL ENCOUNTER (OUTPATIENT)
Dept: RADIATION ONCOLOGY | Facility: HOSPITAL | Age: 54
Discharge: HOME | End: 2019-02-06

## 2019-02-06 PROCEDURE — 77386: CPT | Performed by: RADIOLOGY

## 2019-02-07 ENCOUNTER — TELEPHONE (OUTPATIENT)
Dept: INTERNAL MEDICINE | Facility: CLINIC | Age: 54
End: 2019-02-07

## 2019-02-07 ENCOUNTER — HOSPITAL ENCOUNTER (OUTPATIENT)
Dept: RADIATION ONCOLOGY | Facility: HOSPITAL | Age: 54
Discharge: HOME | End: 2019-02-07

## 2019-02-07 ENCOUNTER — DOCUMENTATION (OUTPATIENT)
Dept: NUTRITION | Facility: HOSPITAL | Age: 54
End: 2019-02-07

## 2019-02-07 DIAGNOSIS — I10 ESSENTIAL HYPERTENSION: ICD-10-CM

## 2019-02-07 PROCEDURE — 77336 RADIATION PHYSICS CONSULT: CPT | Performed by: RADIOLOGY

## 2019-02-07 PROCEDURE — 77386: CPT | Performed by: RADIOLOGY

## 2019-02-07 RX ORDER — LOSARTAN POTASSIUM 25 MG/1
25 TABLET ORAL DAILY
Qty: 30 TABLET | Refills: 1 | Status: SHIPPED | OUTPATIENT
Start: 2019-02-07 | End: 2019-03-25 | Stop reason: DRUGHIGH

## 2019-02-07 NOTE — PROGRESS NOTES
ONC Nutrition     Diagnosis:  Endometrial cancer-Stage 1 / Grade 3  Surgery:  Total laparoscopic hysterectomy, bilateral salpingo-oophorectomy, left parametrectomy November 2018  Radiation:  Adjuvant external radiation followed by intracavitary brachytherapy - 45 gray in 25 fractions,  followed by 2 intracavitary brachytherapy cylinders delivering a dose of 6 Gray to the vaginal surface each    Patient states that she is doing well with progression of treatment; she is experiencing issues with diarrhea which she is currently managing with Immodium.  Discussed nutritional intervention and modification for controlling the diarrhea that she is experiencing;  Discussed the types of fiber, provided guidelines for the types of foods to avoid and soluble fibers to emphasize in her food choices.  Emphasized hydration.  Good understanding verbalized; will continue to follow.

## 2019-02-07 NOTE — TELEPHONE ENCOUNTER
Pt needs refill losartan she thinks bp med not sure if she needs appt to come in or will dr romero send in rx please call pt if she needs an appt

## 2019-02-08 PROCEDURE — 77386: CPT | Performed by: RADIOLOGY

## 2019-02-11 ENCOUNTER — HOSPITAL ENCOUNTER (OUTPATIENT)
Dept: RADIATION ONCOLOGY | Facility: HOSPITAL | Age: 54
Discharge: HOME | End: 2019-02-11

## 2019-02-11 PROCEDURE — 77386: CPT | Performed by: RADIOLOGY

## 2019-02-12 ENCOUNTER — HOSPITAL ENCOUNTER (OUTPATIENT)
Dept: RADIATION ONCOLOGY | Facility: HOSPITAL | Age: 54
Discharge: HOME | End: 2019-02-12

## 2019-02-12 VITALS — BODY MASS INDEX: 38.56 KG/M2 | WEIGHT: 246.2 LBS

## 2019-02-12 PROCEDURE — 77386: CPT | Performed by: RADIOLOGY

## 2019-02-13 ENCOUNTER — HOSPITAL ENCOUNTER (OUTPATIENT)
Dept: RADIATION ONCOLOGY | Facility: HOSPITAL | Age: 54
Discharge: HOME | End: 2019-02-13

## 2019-02-13 PROCEDURE — 77386: CPT | Performed by: RADIOLOGY

## 2019-02-14 ENCOUNTER — HOSPITAL ENCOUNTER (OUTPATIENT)
Dept: RADIATION ONCOLOGY | Facility: HOSPITAL | Age: 54
Discharge: HOME | End: 2019-02-14

## 2019-02-14 PROCEDURE — 77336 RADIATION PHYSICS CONSULT: CPT | Performed by: RADIOLOGY

## 2019-02-14 PROCEDURE — 77386: CPT | Performed by: RADIOLOGY

## 2019-02-15 ENCOUNTER — HOSPITAL ENCOUNTER (OUTPATIENT)
Dept: RADIATION ONCOLOGY | Facility: HOSPITAL | Age: 54
Discharge: HOME | End: 2019-02-15

## 2019-02-15 PROCEDURE — 77386: CPT | Performed by: RADIOLOGY

## 2019-02-18 ENCOUNTER — HOSPITAL ENCOUNTER (OUTPATIENT)
Dept: RADIATION ONCOLOGY | Facility: HOSPITAL | Age: 54
Discharge: HOME | End: 2019-02-18

## 2019-02-18 PROCEDURE — 77386: CPT | Performed by: RADIOLOGY

## 2019-02-21 ENCOUNTER — HOSPITAL ENCOUNTER (OUTPATIENT)
Dept: RADIATION ONCOLOGY | Facility: HOSPITAL | Age: 54
Discharge: HOME | End: 2019-02-21

## 2019-02-21 PROCEDURE — 77470 SPECIAL RADIATION TREATMENT: CPT | Performed by: RADIOLOGY

## 2019-02-21 PROCEDURE — 77290 THER RAD SIMULAJ FIELD CPLX: CPT | Performed by: RADIOLOGY

## 2019-02-22 PROCEDURE — 77316 BRACHYTX ISODOSE PLAN SIMPLE: CPT | Performed by: RADIOLOGY

## 2019-02-22 PROCEDURE — 77295 3-D RADIOTHERAPY PLAN: CPT | Performed by: RADIOLOGY

## 2019-02-26 ENCOUNTER — HOSPITAL ENCOUNTER (OUTPATIENT)
Dept: RADIATION ONCOLOGY | Facility: HOSPITAL | Age: 54
Discharge: HOME | End: 2019-02-26

## 2019-02-26 PROCEDURE — 57156 INS VAG BRACHYTX DEVICE: CPT | Performed by: RADIOLOGY

## 2019-02-26 PROCEDURE — 77770 HDR RDNCL NTRSTL/ICAV BRCHTX: CPT | Performed by: RADIOLOGY

## 2019-02-26 PROCEDURE — C1717 BRACHYTX, NON-STR,HDR IR-192: HCPCS | Performed by: RADIOLOGY

## 2019-02-26 NOTE — PROGRESS NOTES
02/26/2019  Tiarra Becerra    HDR#   1  Patient presents for cylinder brachytherapy.  She has no complaints of urinary discomfort, diarrhea or vaginal irritation.  The cylinder was inserted and treatment of 6 Gy to the surface of the upper vaginal mucosa was delivered. Patient tolerated procedure well with no complications.  Physics survey was negative with no residual radioactivity.  Discharged to home in good condition.  She will return for next treatment.

## 2019-02-28 ENCOUNTER — HOSPITAL ENCOUNTER (OUTPATIENT)
Dept: RADIATION ONCOLOGY | Facility: HOSPITAL | Age: 54
Discharge: HOME | End: 2019-02-28

## 2019-02-28 PROCEDURE — 77770 HDR RDNCL NTRSTL/ICAV BRCHTX: CPT | Performed by: RADIOLOGY

## 2019-02-28 PROCEDURE — 57156 INS VAG BRACHYTX DEVICE: CPT | Performed by: RADIOLOGY

## 2019-02-28 PROCEDURE — C1717 BRACHYTX, NON-STR,HDR IR-192: HCPCS | Performed by: RADIOLOGY

## 2019-03-07 NOTE — RADIATION COMPLETION NOTES
NAME    Tiarra Becerra  DATE OF BIRTH  1965  MRN    [id number]  REFERRING   April Jaffe MD  DIAGNOSIS   Uterine Cancer  Clinical: FIGO Stage I (cT1, cN0, cM0)  Pathologic: FIGO Stage IIB (cT2b, cN0, cM0)    DATE OF COMPLETION: 2/28/2019      Tiarra Becerra completed a course of radiation therapy in our department today.    In short, Tiarra Becerra has been diagnosed with a multifocal high grade uterine cancer, arising in the setting of endometriosis with multiple pelvic peritoneal implants of endometriosis.  She completed surgical staging which identified uterine confined disease with regards to at least the malignancy, but with several adverse risk features.  She completed a course of combined pelvic radiation therapy followed by intracavitary brachytherapy.  The details of her treatments are as detailed below:    DATES OF THERAPY: 1/14/2019 - 2/28/2019  TOTAL NUMBER OF ELAPSED DAYS: 43  TREATMENT SITE: Pelvis  TECHNIQUE:  External Beam Radiation Therapy  She received 45 Gy to the pelvis in 25 fractions of 180 cGy delivered daily.  Intensity modulated radiation therapy was used in an effort to spare her small intestines and bone marrow to reduce toxicity.    Brachytherapy  Intracavitary brachytherapy was performed, and she received 2 vaginal cylinder applicators delivering a dose of 6 Gy to the vaginal surface for an active length of 3cm.  These were performed beginning the week following completion of her external radiation treatments and were completed 2 days apart.    TREATMENT COURSE AND TOLERANCE: She developed the expected acute toxicities including fatigue, grade 1 loose stool/diarrhea that was managed with Imodium and dietary changes.  These were improving as she completed intracavitary brachytherapy.    FOLLOW-UP PLANS: 3 weeks    CC: April Jaffe MD

## 2019-03-22 ENCOUNTER — OFFICE VISIT (OUTPATIENT)
Dept: RADIATION ONCOLOGY | Facility: HOSPITAL | Age: 54
End: 2019-03-22

## 2019-03-22 ENCOUNTER — HOSPITAL ENCOUNTER (OUTPATIENT)
Dept: RADIATION ONCOLOGY | Facility: HOSPITAL | Age: 54
Setting detail: RADIATION/ONCOLOGY SERIES
Discharge: HOME | End: 2019-03-22

## 2019-03-22 VITALS
OXYGEN SATURATION: 98 % | WEIGHT: 242.5 LBS | HEART RATE: 80 BPM | TEMPERATURE: 97.2 F | BODY MASS INDEX: 37.98 KG/M2 | RESPIRATION RATE: 18 BRPM

## 2019-03-22 DIAGNOSIS — C54.1 ENDOMETRIAL CANCER (HCC): ICD-10-CM

## 2019-03-22 PROCEDURE — G0463 HOSPITAL OUTPT CLINIC VISIT: HCPCS | Performed by: RADIOLOGY

## 2019-03-22 NOTE — PROGRESS NOTES
FOLLOW UP NOTE    PATIENT:                                                      Tiarra Becerra  MEDICAL RECORD #:                        [id number]  :                                                          1965  COMPLETION DATE:    19  DIAGNOSIS:     Cancer Staging  Endometrial cancer (CMS/[facility])  Staging form: Corpus Uteri - Carcinoma And Carcinosarcoma, AJCC 8th Edition  - Clinical stage from 2018: FIGO Stage I (cT1, cN0, cM0) - Signed by April Jaffe MD on 2018    Malignant neoplasm of endocervix (CMS/HCC)  Staging form: Cervix Uteri, AJCC 8th Edition  - Clinical stage from 2018: FIGO Stage IIB (cT2b, cN0, cM0) - Signed by April Jaffe MD on 2018    BRIEF HISTORY:  Mrs. 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  She completed a course of pelvic radiation followed by intracavitary brachytherapy on .  She states she is doing very well, and denies any vaginal bleeding or discharge.  She also denies any genitourinary symptoms.  She has been slowly introducing a wide variety of foods back into her diet, and denies having any difficulty with diarrhea.    MEDICATIONS: Medication reconciliation for the patient was reviewed and confirmed in the electronic medical record.    Review of Systems   HENT:   Positive for tinnitus.    All other systems reviewed and are negative.      KPS 90%    Physical Exam   Constitutional: She is oriented to person, place, and time. She appears well-developed and well-nourished. No distress.   HENT:   Head: Normocephalic and atraumatic.   Mouth/Throat: Oropharynx is clear and moist.   Eyes: Conjunctivae and EOM are normal. Pupils are equal, round, and reactive to light.   Neck: Normal range of motion. Neck supple.   Cardiovascular: Normal rate and regular  rhythm. Exam reveals no friction rub.   No murmur heard.  Pulmonary/Chest: Effort normal and breath sounds normal. She has no wheezes.   Abdominal: Soft. Bowel sounds are normal. She exhibits no distension and no mass. There is no tenderness.   Musculoskeletal: Normal range of motion. She exhibits no edema.   Lymphadenopathy:     She has no cervical adenopathy.   Neurological: She is alert and oriented to person, place, and time.   Skin: Skin is warm and dry.   Psychiatric: She has a normal mood and affect. Her behavior is normal. Judgment and thought content normal.   Nursing note and vitals reviewed.      VITAL SIGNS:   Vitals:    03/22/19 0936   Pulse: 80   Resp: 18   Temp: 97.2 °F (36.2 °C)   TempSrc: Temporal   SpO2: 98%   Weight: 110 kg (242 lb 8 oz)   PainSc: 0-No pain       The following portions of the patient's history were reviewed and updated as appropriate: allergies, current medications, past family history, past medical history, past social history, past surgical history and problem list.         Tiarra was seen today for uterine cancer.    Diagnoses and all orders for this visit:    Endometrial cancer (CMS/[facility])         IMPRESSION: Mrs. Becerra is a 54-year-old female who was completed pelvic radiation and vaginal brachytherapy delivered as adjuvant treatment for her FIGO stage Ib endometrioid adenocarcinoma of the uterus, arising in the setting of endometriosis.  I am very pleased with how she is recovered and at this point she has only minimal residual symptoms including slightly increased sensitivity to foods.  I counseled her regarding use of a vaginal dilator and return to sexual activity, and discussed the typical follow-up plans.  She is scheduled to be seen by Dr. Jaffe in June 2019, and I scheduled her to return to my clinic in September 2019.    RECOMMENDATIONS:      Return in about 6 months (around 9/22/2019) for Office Visit.    Brennan Vincent MD    Errors in dictation may  reflect use of voice recognition software and not all errors in transcription may have been detected prior to signing.

## 2019-03-25 ENCOUNTER — OFFICE VISIT (OUTPATIENT)
Dept: INTERNAL MEDICINE | Facility: CLINIC | Age: 54
End: 2019-03-25

## 2019-03-25 VITALS
HEART RATE: 80 BPM | OXYGEN SATURATION: 99 % | TEMPERATURE: 98.5 F | BODY MASS INDEX: 38.92 KG/M2 | HEIGHT: 66 IN | DIASTOLIC BLOOD PRESSURE: 88 MMHG | SYSTOLIC BLOOD PRESSURE: 124 MMHG | WEIGHT: 242.2 LBS

## 2019-03-25 DIAGNOSIS — C54.1 ENDOMETRIAL CANCER (HCC): ICD-10-CM

## 2019-03-25 DIAGNOSIS — E78.1 HIGH BLOOD TRIGLYCERIDES: ICD-10-CM

## 2019-03-25 DIAGNOSIS — I10 ESSENTIAL HYPERTENSION: Primary | ICD-10-CM

## 2019-03-25 DIAGNOSIS — E55.9 VITAMIN D DEFICIENCY: ICD-10-CM

## 2019-03-25 LAB
25(OH)D3 SERPL-MCNC: 26.4 NG/ML
ALBUMIN SERPL-MCNC: 4.44 G/DL (ref 3.2–4.8)
ALBUMIN/GLOB SERPL: 2.3 G/DL (ref 1.5–2.5)
ALP SERPL-CCNC: 113 U/L (ref 25–100)
ALT SERPL W P-5'-P-CCNC: 23 U/L (ref 7–40)
ANION GAP SERPL CALCULATED.3IONS-SCNC: 10 MMOL/L (ref 3–11)
ARTICHOKE IGE QN: 89 MG/DL (ref 0–130)
AST SERPL-CCNC: 16 U/L (ref 0–33)
BASOPHILS # BLD AUTO: 0.02 10*3/MM3 (ref 0–0.2)
BASOPHILS NFR BLD AUTO: 0.4 % (ref 0–1)
BILIRUB SERPL-MCNC: 0.4 MG/DL (ref 0.3–1.2)
BUN BLD-MCNC: 13 MG/DL (ref 9–23)
BUN/CREAT SERPL: 17.8 (ref 7–25)
CALCIUM SPEC-SCNC: 9.6 MG/DL (ref 8.7–10.4)
CHLORIDE SERPL-SCNC: 102 MMOL/L (ref 99–109)
CHOLEST SERPL-MCNC: 269 MG/DL (ref 0–200)
CO2 SERPL-SCNC: 27 MMOL/L (ref 20–31)
CREAT BLD-MCNC: 0.73 MG/DL (ref 0.6–1.3)
DEPRECATED RDW RBC AUTO: 52 FL (ref 37–54)
EOSINOPHIL # BLD AUTO: 0.18 10*3/MM3 (ref 0–0.3)
EOSINOPHIL NFR BLD AUTO: 3.7 % (ref 0–3)
ERYTHROCYTE [DISTWIDTH] IN BLOOD BY AUTOMATED COUNT: 17.8 % (ref 11.3–14.5)
GFR SERPL CREATININE-BSD FRML MDRD: 83 ML/MIN/1.73
GLOBULIN UR ELPH-MCNC: 2 GM/DL
GLUCOSE BLD-MCNC: 106 MG/DL (ref 70–100)
HCT VFR BLD AUTO: 40.3 % (ref 34.5–44)
HDLC SERPL-MCNC: 65 MG/DL (ref 40–60)
HGB BLD-MCNC: 12.7 G/DL (ref 11.5–15.5)
IMM GRANULOCYTES # BLD AUTO: 0.01 10*3/MM3 (ref 0–0.05)
IMM GRANULOCYTES NFR BLD AUTO: 0.2 % (ref 0–0.6)
LYMPHOCYTES # BLD AUTO: 0.4 10*3/MM3 (ref 0.6–4.8)
LYMPHOCYTES NFR BLD AUTO: 8.2 % (ref 24–44)
MCH RBC QN AUTO: 25.1 PG (ref 27–31)
MCHC RBC AUTO-ENTMCNC: 31.5 G/DL (ref 32–36)
MCV RBC AUTO: 79.6 FL (ref 80–99)
MONOCYTES # BLD AUTO: 0.37 10*3/MM3 (ref 0–1)
MONOCYTES NFR BLD AUTO: 7.6 % (ref 0–12)
NEUTROPHILS # BLD AUTO: 3.9 10*3/MM3 (ref 1.5–8.3)
NEUTROPHILS NFR BLD AUTO: 80.1 % (ref 41–71)
PLATELET # BLD AUTO: 199 10*3/MM3 (ref 150–450)
PMV BLD AUTO: 10.7 FL (ref 6–12)
POTASSIUM BLD-SCNC: 4.2 MMOL/L (ref 3.5–5.5)
PROT SERPL-MCNC: 6.4 G/DL (ref 5.7–8.2)
RBC # BLD AUTO: 5.06 10*6/MM3 (ref 3.89–5.14)
SODIUM BLD-SCNC: 139 MMOL/L (ref 132–146)
TRIGL SERPL-MCNC: 290 MG/DL (ref 0–150)
TSH SERPL DL<=0.05 MIU/L-ACNC: 1.82 MIU/ML (ref 0.35–5.35)
WBC NRBC COR # BLD: 4.87 10*3/MM3 (ref 3.5–10.8)

## 2019-03-25 PROCEDURE — 85025 COMPLETE CBC W/AUTO DIFF WBC: CPT | Performed by: FAMILY MEDICINE

## 2019-03-25 PROCEDURE — 99213 OFFICE O/P EST LOW 20 MIN: CPT | Performed by: FAMILY MEDICINE

## 2019-03-25 PROCEDURE — 80061 LIPID PANEL: CPT | Performed by: FAMILY MEDICINE

## 2019-03-25 PROCEDURE — 84443 ASSAY THYROID STIM HORMONE: CPT | Performed by: FAMILY MEDICINE

## 2019-03-25 PROCEDURE — 82306 VITAMIN D 25 HYDROXY: CPT | Performed by: FAMILY MEDICINE

## 2019-03-25 PROCEDURE — 80053 COMPREHEN METABOLIC PANEL: CPT | Performed by: FAMILY MEDICINE

## 2019-03-25 RX ORDER — LOSARTAN POTASSIUM 25 MG/1
25 TABLET ORAL DAILY
Qty: 30 TABLET | Refills: 1 | Status: CANCELLED | OUTPATIENT
Start: 2019-03-25

## 2019-03-25 RX ORDER — LOSARTAN POTASSIUM 50 MG/1
50 TABLET ORAL DAILY
Qty: 30 TABLET | Refills: 1 | Status: SHIPPED | OUTPATIENT
Start: 2019-03-25 | End: 2019-05-30 | Stop reason: SDUPTHER

## 2019-03-25 NOTE — PROGRESS NOTES
"Subjective   Tiarra Becerra is a 54 y.o. female.     Chief Complaint   Patient presents with   • Hypertension     f/u       Visit Vitals  /88   Pulse 80   Temp 98.5 °F (36.9 °C)   Ht 167.6 cm (66\")   Wt 110 kg (242 lb 3.2 oz)   LMP 01/02/2018   SpO2 99%   BMI 39.09 kg/m²         Hypertension   This is a chronic problem. The current episode started more than 1 year ago. The problem has been waxing and waning since onset. The problem is uncontrolled. Associated symptoms include palpitations (few times per week). Pertinent negatives include no anxiety, blurred vision, chest pain, headaches, malaise/fatigue, neck pain, orthopnea, peripheral edema, PND, shortness of breath or sweats. There are no associated agents to hypertension. Risk factors for coronary artery disease include family history, obesity and post-menopausal state. Current antihypertension treatment includes angiotensin blockers. The current treatment provides moderate improvement. There are no compliance problems.  There is no history of angina, kidney disease, CAD/MI, CVA, heart failure, left ventricular hypertrophy, PVD or retinopathy. Identifiable causes of hypertension include a thyroid problem (left thyroid mass). There is no history of sleep apnea.      Pt had follow up appt with radiation oncology 182/102 . Home pressure was also elevated.   Last radiation for endometrial Ca was February 28th.    The following portions of the patient's history were reviewed and updated as appropriate: allergies, current medications, past family history, past medical history, past social history, past surgical history and problem list.    Past Medical History:   Diagnosis Date   • Endometrial cancer (CMS/HCC) 11/28/2018   • Essential hypertension 8/6/2018      Past Surgical History:   Procedure Laterality Date   • D&C HYSTEROSCOPY MYOSURE  11/05/2018   • TOTAL LAPAROSCOPIC HYSTERECTOMY WITH DAVINCI ROBOT  11/30/2018    laproscopic with BSO, lymph node dissection, " left parametrectomy, lysis ureters, Dr Jaffe. Cancer   • TOTAL LAPAROSCOPIC HYSTERECTOMY, LAPAROSCOPIC NODE DISSECTION N/A 2018    Procedure: TOTAL LAPAROSCOPIC HYSTERECTOMY WITH DAVINCI ROBOT, BILATERAL SALPINGO OOPHORECTOMY, WITH RADICAL LEFT PARAMETRECTOMY, BILATERAL UTERETAL LYSIS, PELVIC LYMPH NODE DISSECTION;  Surgeon: April Jaffe MD;  Location: [facility];  Service: DaVinci   • TUBAL ABDOMINAL LIGATION        Family History   Problem Relation Age of Onset   • Breast cancer Cousin 55        50's   • Arthritis Mother    • Heart attack Mother    • Cancer Father         prostate   • Heart attack Father    • Hypertension Father    • Stroke Father    • Diabetes Sister         type 1   • Diabetes Brother         type 1   • Stroke Paternal Grandmother    • Ovarian cancer Neg Hx       Social History     Socioeconomic History   • Marital status:      Spouse name: Not on file   • Number of children: Not on file   • Years of education: Not on file   • Highest education level: Not on file   Tobacco Use   • Smoking status: Former Smoker     Packs/day: 0.75     Years: 12.00     Pack years: 9.00     Types: Cigarettes     Last attempt to quit:      Years since quittin.2   • Smokeless tobacco: Never Used   Substance and Sexual Activity   • Alcohol use: No   • Drug use: No   • Sexual activity: Yes     Partners: Male      Allergies   Allergen Reactions   • Lisinopril Cough       Review of Systems   Constitutional: Negative.  Negative for chills, diaphoresis, fatigue, fever and malaise/fatigue.   HENT: Positive for postnasal drip. Negative for ear pain, nosebleeds, rhinorrhea, sinus pressure, sneezing and sore throat.    Eyes: Positive for visual disturbance. Negative for blurred vision, redness and itching.   Respiratory: Negative.  Negative for apnea, cough, shortness of breath and wheezing.    Cardiovascular: Positive for palpitations (few times per week). Negative for chest pain, orthopnea  and PND.   Gastrointestinal: Negative.  Negative for abdominal pain, constipation, diarrhea, nausea and vomiting.   Endocrine: Negative.  Negative for cold intolerance and heat intolerance.   Genitourinary: Positive for frequency. Negative for dysuria, hematuria and urgency.   Musculoskeletal: Negative.  Negative for arthralgias, back pain and neck pain.   Skin: Positive for rash (on abdomen). Negative for color change.   Allergic/Immunologic: Positive for environmental allergies.   Neurological: Negative.  Negative for dizziness, syncope, light-headedness and headaches.   Hematological: Negative.  Negative for adenopathy. Does not bruise/bleed easily.   Psychiatric/Behavioral: Positive for sleep disturbance (mild). Negative for dysphoric mood. The patient is not nervous/anxious.        Objective   Physical Exam   Constitutional: She is oriented to person, place, and time. She appears well-developed.   HENT:   Head: Normocephalic.   Right Ear: External ear normal.   Left Ear: External ear normal.   Nose: Nose normal.   Eyes: Conjunctivae, EOM and lids are normal. Pupils are equal, round, and reactive to light.   Neck: Trachea normal and normal range of motion. Neck supple. Carotid bruit is not present. Thyroid mass present. No thyromegaly present.       Cardiovascular: Normal rate and regular rhythm.   No murmur heard.  Pulmonary/Chest: Effort normal and breath sounds normal. No respiratory distress. She has no decreased breath sounds. She has no wheezes. She has no rhonchi. She has no rales. She exhibits no tenderness.   Abdominal: Soft. Bowel sounds are normal. There is no tenderness.   Musculoskeletal: Normal range of motion.   Neurological: She is alert and oriented to person, place, and time.   Skin: Skin is warm and dry.   Psychiatric: She has a normal mood and affect. Her behavior is normal.   Nursing note and vitals reviewed.      Assessment/Plan   Tiarra was seen today for hypertension.    Diagnoses and all  orders for this visit:    Essential hypertension  -     Comprehensive Metabolic Panel  -     Lipid Panel  -     TSH  -     CBC & Differential  -     losartan (COZAAR) 50 MG tablet; Take 1 tablet by mouth Daily.  -     CBC Auto Differential    Vitamin D deficiency  -     Vitamin D 25 Hydroxy    High blood triglycerides    Endometrial cancer (CMS/HCC)    Body mass index (BMI) 35.0-35.9, adult    Other orders  -     Cancel: losartan (COZAAR) 25 MG tablet; Take 1 tablet by mouth Daily.     increase losartan to 50 mg per day    Please follow a low animal fat diet that is also low in sugar, low in junk food, low in sweet drinks and low in alcohol.  Please increase the amount of fiber in your diet as well as increasing your daily exercise, such as walking.             Current Outpatient Medications:   •  cetirizine (ZyrTEC) 10 MG tablet, Take 10 mg by mouth Every Other Day., Disp: , Rfl:   •  Cholecalciferol (VITAMIN D-3 PO), Take 2,000 Units by mouth Daily., Disp: , Rfl:   •  losartan (COZAAR) 50 MG tablet, Take 1 tablet by mouth Daily., Disp: 30 tablet, Rfl: 1    Return in about 3 months (around 6/25/2019), or if symptoms worsen or fail to improve, for Recheck BP with nurse in 2 weeks.

## 2019-03-26 ENCOUNTER — TELEPHONE (OUTPATIENT)
Dept: INTERNAL MEDICINE | Facility: CLINIC | Age: 54
End: 2019-03-26

## 2019-03-26 NOTE — TELEPHONE ENCOUNTER
----- Message from Fallon RAY MD sent at 3/25/2019  6:42 PM EDT -----  Please call the patient regarding her abnormal result.  Glucose elevated if fasting.  Triglycerides very high.  Is patient taking fish oil?  We can add fenofibrate.     Vitamin D low.  Add over-the-counter vitamin D3 1000 units/day.

## 2019-03-27 RX ORDER — IBUPROFEN 800 MG
TABLET ORAL
COMMUNITY
End: 2019-07-08

## 2019-03-27 NOTE — TELEPHONE ENCOUNTER
Pt was notified of levels, she will be adding fish oil, she had not been taking. She also states she already takes Vitamin D3 bid. Which is on her old med list.

## 2019-04-08 ENCOUNTER — TELEPHONE (OUTPATIENT)
Dept: INTERNAL MEDICINE | Facility: CLINIC | Age: 54
End: 2019-04-08

## 2019-04-08 ENCOUNTER — CLINICAL SUPPORT (OUTPATIENT)
Dept: INTERNAL MEDICINE | Facility: CLINIC | Age: 54
End: 2019-04-08

## 2019-04-08 VITALS — DIASTOLIC BLOOD PRESSURE: 78 MMHG | SYSTOLIC BLOOD PRESSURE: 126 MMHG

## 2019-04-08 NOTE — TELEPHONE ENCOUNTER
Came in today for bp check. Obtained a reading of 126/82. Brought her own monitor in and reading was 118/76.

## 2019-05-23 ENCOUNTER — TRANSCRIBE ORDERS (OUTPATIENT)
Dept: ADMINISTRATIVE | Facility: HOSPITAL | Age: 54
End: 2019-05-23

## 2019-05-23 DIAGNOSIS — Z12.31 VISIT FOR SCREENING MAMMOGRAM: Primary | ICD-10-CM

## 2019-05-30 DIAGNOSIS — I10 ESSENTIAL HYPERTENSION: ICD-10-CM

## 2019-05-31 RX ORDER — LOSARTAN POTASSIUM 50 MG/1
TABLET ORAL
Qty: 30 TABLET | Refills: 0 | Status: SHIPPED | OUTPATIENT
Start: 2019-05-31 | End: 2019-07-01 | Stop reason: SDUPTHER

## 2019-06-05 ENCOUNTER — OFFICE VISIT (OUTPATIENT)
Dept: GYNECOLOGIC ONCOLOGY | Facility: CLINIC | Age: 54
End: 2019-06-05

## 2019-06-05 DIAGNOSIS — C53.0 MALIGNANT NEOPLASM OF ENDOCERVIX (HCC): ICD-10-CM

## 2019-06-05 DIAGNOSIS — C54.1 ENDOMETRIAL CANCER (HCC): Primary | ICD-10-CM

## 2019-06-05 PROCEDURE — 99213 OFFICE O/P EST LOW 20 MIN: CPT | Performed by: OBSTETRICS & GYNECOLOGY

## 2019-06-06 VITALS
SYSTOLIC BLOOD PRESSURE: 175 MMHG | DIASTOLIC BLOOD PRESSURE: 86 MMHG | WEIGHT: 241 LBS | BODY MASS INDEX: 38.9 KG/M2 | TEMPERATURE: 97.5 F | HEART RATE: 71 BPM | OXYGEN SATURATION: 99 % | RESPIRATION RATE: 16 BRPM

## 2019-06-12 ENCOUNTER — HOSPITAL ENCOUNTER (OUTPATIENT)
Dept: CT IMAGING | Facility: HOSPITAL | Age: 54
Discharge: HOME | End: 2019-06-12
Admitting: OBSTETRICS & GYNECOLOGY

## 2019-06-12 DIAGNOSIS — C53.0 MALIGNANT NEOPLASM OF ENDOCERVIX (HCC): ICD-10-CM

## 2019-06-12 DIAGNOSIS — C54.1 ENDOMETRIAL CANCER (HCC): ICD-10-CM

## 2019-06-12 PROCEDURE — 71260 CT THORAX DX C+: CPT

## 2019-06-12 PROCEDURE — 25010000002 IOPAMIDOL 61 % SOLUTION: Performed by: OBSTETRICS & GYNECOLOGY

## 2019-06-12 PROCEDURE — 74177 CT ABD & PELVIS W/CONTRAST: CPT

## 2019-06-12 PROCEDURE — 82565 ASSAY OF CREATININE: CPT

## 2019-06-12 RX ADMIN — IOPAMIDOL 100 ML: 612 INJECTION, SOLUTION INTRAVENOUS at 15:35

## 2019-06-12 RX ADMIN — BARIUM SULFATE 450 ML: 21 SUSPENSION ORAL at 14:30

## 2019-06-13 LAB — CREAT BLDA-MCNC: 0.6 MG/DL (ref 0.6–1.3)

## 2019-06-16 ENCOUNTER — TELEPHONE (OUTPATIENT)
Dept: GYNECOLOGIC ONCOLOGY | Facility: CLINIC | Age: 54
End: 2019-06-16

## 2019-07-01 ENCOUNTER — HOSPITAL ENCOUNTER (OUTPATIENT)
Dept: MAMMOGRAPHY | Facility: HOSPITAL | Age: 54
End: 2019-07-01

## 2019-07-01 ENCOUNTER — TELEPHONE (OUTPATIENT)
Dept: INTERNAL MEDICINE | Facility: CLINIC | Age: 54
End: 2019-07-01

## 2019-07-01 DIAGNOSIS — I10 ESSENTIAL HYPERTENSION: ICD-10-CM

## 2019-07-01 RX ORDER — LOSARTAN POTASSIUM 50 MG/1
50 TABLET ORAL DAILY
Qty: 30 TABLET | Refills: 5 | Status: SHIPPED | OUTPATIENT
Start: 2019-07-01 | End: 2019-07-08 | Stop reason: SDUPTHER

## 2019-07-08 ENCOUNTER — OFFICE VISIT (OUTPATIENT)
Dept: INTERNAL MEDICINE | Facility: CLINIC | Age: 54
End: 2019-07-08

## 2019-07-08 ENCOUNTER — HOSPITAL ENCOUNTER (OUTPATIENT)
Dept: MAMMOGRAPHY | Facility: HOSPITAL | Age: 54
Discharge: HOME | End: 2019-07-08
Admitting: FAMILY MEDICINE

## 2019-07-08 VITALS
BODY MASS INDEX: 37.86 KG/M2 | WEIGHT: 235.6 LBS | OXYGEN SATURATION: 100 % | DIASTOLIC BLOOD PRESSURE: 68 MMHG | HEART RATE: 72 BPM | TEMPERATURE: 98 F | HEIGHT: 66 IN | SYSTOLIC BLOOD PRESSURE: 132 MMHG

## 2019-07-08 DIAGNOSIS — E78.1 HIGH BLOOD TRIGLYCERIDES: ICD-10-CM

## 2019-07-08 DIAGNOSIS — C53.0 MALIGNANT NEOPLASM OF ENDOCERVIX (HCC): ICD-10-CM

## 2019-07-08 DIAGNOSIS — C54.1 ENDOMETRIAL CANCER (HCC): Primary | ICD-10-CM

## 2019-07-08 DIAGNOSIS — I10 ESSENTIAL HYPERTENSION: ICD-10-CM

## 2019-07-08 DIAGNOSIS — E55.9 VITAMIN D DEFICIENCY: ICD-10-CM

## 2019-07-08 DIAGNOSIS — Z12.31 VISIT FOR SCREENING MAMMOGRAM: ICD-10-CM

## 2019-07-08 LAB
25(OH)D3 SERPL-MCNC: 35.8 NG/ML (ref 30–100)
ALBUMIN SERPL-MCNC: 4.4 G/DL (ref 3.5–5.2)
ALBUMIN/GLOB SERPL: 1.7 G/DL
ALP SERPL-CCNC: 110 U/L (ref 39–117)
ALT SERPL W P-5'-P-CCNC: 17 U/L (ref 1–33)
ANION GAP SERPL CALCULATED.3IONS-SCNC: 12.6 MMOL/L (ref 5–15)
AST SERPL-CCNC: 17 U/L (ref 1–32)
BASOPHILS # BLD AUTO: 0.02 10*3/MM3 (ref 0–0.2)
BASOPHILS NFR BLD AUTO: 0.5 % (ref 0–1.5)
BILIRUB SERPL-MCNC: 0.4 MG/DL (ref 0.2–1.2)
BUN BLD-MCNC: 9 MG/DL (ref 6–20)
BUN/CREAT SERPL: 11.1 (ref 7–25)
CALCIUM SPEC-SCNC: 10 MG/DL (ref 8.6–10.5)
CHLORIDE SERPL-SCNC: 103 MMOL/L (ref 98–107)
CHOLEST SERPL-MCNC: 206 MG/DL (ref 0–200)
CO2 SERPL-SCNC: 26.4 MMOL/L (ref 22–29)
CREAT BLD-MCNC: 0.81 MG/DL (ref 0.57–1)
DEPRECATED RDW RBC AUTO: 42.6 FL (ref 37–54)
EOSINOPHIL # BLD AUTO: 0.11 10*3/MM3 (ref 0–0.4)
EOSINOPHIL NFR BLD AUTO: 2.8 % (ref 0.3–6.2)
ERYTHROCYTE [DISTWIDTH] IN BLOOD BY AUTOMATED COUNT: 13.9 % (ref 12.3–15.4)
GFR SERPL CREATININE-BSD FRML MDRD: 73 ML/MIN/1.73
GLOBULIN UR ELPH-MCNC: 2.6 GM/DL
GLUCOSE BLD-MCNC: 94 MG/DL (ref 65–99)
HCT VFR BLD AUTO: 42.6 % (ref 34–46.6)
HDLC SERPL-MCNC: 56 MG/DL (ref 40–60)
HGB BLD-MCNC: 13.1 G/DL (ref 12–15.9)
IMM GRANULOCYTES # BLD AUTO: 0.01 10*3/MM3 (ref 0–0.05)
IMM GRANULOCYTES NFR BLD AUTO: 0.3 % (ref 0–0.5)
LDLC SERPL CALC-MCNC: 95 MG/DL (ref 0–100)
LDLC/HDLC SERPL: 1.7 {RATIO}
LYMPHOCYTES # BLD AUTO: 0.4 10*3/MM3 (ref 0.7–3.1)
LYMPHOCYTES NFR BLD AUTO: 10.1 % (ref 19.6–45.3)
MCH RBC QN AUTO: 25.9 PG (ref 26.6–33)
MCHC RBC AUTO-ENTMCNC: 30.8 G/DL (ref 31.5–35.7)
MCV RBC AUTO: 84.2 FL (ref 79–97)
MONOCYTES # BLD AUTO: 0.3 10*3/MM3 (ref 0.1–0.9)
MONOCYTES NFR BLD AUTO: 7.5 % (ref 5–12)
NEUTROPHILS # BLD AUTO: 3.14 10*3/MM3 (ref 1.7–7)
NEUTROPHILS NFR BLD AUTO: 78.8 % (ref 42.7–76)
NRBC BLD AUTO-RTO: 0 /100 WBC (ref 0–0.2)
PLATELET # BLD AUTO: 180 10*3/MM3 (ref 140–450)
PMV BLD AUTO: 10.5 FL (ref 6–12)
POTASSIUM BLD-SCNC: 4.6 MMOL/L (ref 3.5–5.2)
PROT SERPL-MCNC: 7 G/DL (ref 6–8.5)
RBC # BLD AUTO: 5.06 10*6/MM3 (ref 3.77–5.28)
SODIUM BLD-SCNC: 142 MMOL/L (ref 136–145)
TRIGL SERPL-MCNC: 275 MG/DL (ref 0–150)
TSH SERPL DL<=0.05 MIU/L-ACNC: 2.52 MIU/ML (ref 0.27–4.2)
VLDLC SERPL-MCNC: 55 MG/DL (ref 5–40)
WBC NRBC COR # BLD: 3.98 10*3/MM3 (ref 3.4–10.8)

## 2019-07-08 PROCEDURE — 77067 SCR MAMMO BI INCL CAD: CPT | Performed by: RADIOLOGY

## 2019-07-08 PROCEDURE — 77067 SCR MAMMO BI INCL CAD: CPT

## 2019-07-08 PROCEDURE — 77063 BREAST TOMOSYNTHESIS BI: CPT | Performed by: RADIOLOGY

## 2019-07-08 PROCEDURE — 80053 COMPREHEN METABOLIC PANEL: CPT | Performed by: FAMILY MEDICINE

## 2019-07-08 PROCEDURE — 84443 ASSAY THYROID STIM HORMONE: CPT | Performed by: FAMILY MEDICINE

## 2019-07-08 PROCEDURE — 85025 COMPLETE CBC W/AUTO DIFF WBC: CPT | Performed by: FAMILY MEDICINE

## 2019-07-08 PROCEDURE — 77063 BREAST TOMOSYNTHESIS BI: CPT

## 2019-07-08 PROCEDURE — 82306 VITAMIN D 25 HYDROXY: CPT | Performed by: FAMILY MEDICINE

## 2019-07-08 PROCEDURE — 99214 OFFICE O/P EST MOD 30 MIN: CPT | Performed by: FAMILY MEDICINE

## 2019-07-08 PROCEDURE — 80061 LIPID PANEL: CPT | Performed by: FAMILY MEDICINE

## 2019-07-08 RX ORDER — LOSARTAN POTASSIUM 50 MG/1
50 TABLET ORAL DAILY
Qty: 90 TABLET | Refills: 1 | Status: SHIPPED | OUTPATIENT
Start: 2019-07-08 | End: 2019-11-13 | Stop reason: DRUGHIGH

## 2019-07-08 RX ORDER — CHLORAL HYDRATE 500 MG
CAPSULE ORAL
COMMUNITY
End: 2021-03-01

## 2019-07-08 NOTE — PROGRESS NOTES
"Subjective   Tiarra Becerra is a 54 y.o. female.     Chief Complaint   Patient presents with   • Hypertension     f/u       Visit Vitals  /68 (Cuff Size: Large Adult)   Pulse 72   Temp 98 °F (36.7 °C)   Ht 167.6 cm (66\")   Wt 107 kg (235 lb 9.6 oz)   LMP 01/02/2018   SpO2 100%   BMI 38.03 kg/m²         Hypertension   This is a chronic problem. The current episode started more than 1 year ago. The problem is unchanged. The problem is controlled. Pertinent negatives include no anxiety, blurred vision, chest pain, headaches, malaise/fatigue, neck pain, orthopnea, palpitations, peripheral edema, PND or shortness of breath. There are no associated agents to hypertension. Risk factors for coronary artery disease include dyslipidemia, family history, obesity and post-menopausal state. Current antihypertension treatment includes angiotensin blockers. The current treatment provides significant improvement. There is no history of angina, kidney disease, CAD/MI, CVA, heart failure, left ventricular hypertrophy, PVD or retinopathy. There is no history of chronic renal disease, sleep apnea or a thyroid problem.   Hyperlipidemia   This is a chronic problem. The current episode started more than 1 year ago. The problem is controlled. Recent lipid tests were reviewed and are normal. Exacerbating diseases include obesity. She has no history of chronic renal disease, diabetes, hypothyroidism, liver disease or nephrotic syndrome. There are no known factors aggravating her hyperlipidemia. Pertinent negatives include no chest pain, focal sensory loss, focal weakness, leg pain, myalgias or shortness of breath. Treatments tried: omega 3. The current treatment provides significant improvement of lipids. There are no compliance problems.  Risk factors for coronary artery disease include dyslipidemia, hypertension, family history, obesity and post-menopausal.        Pt has mammo later today. Pt has had hysterectomy and has finished " radiation , for endometrial and endocervical CA.   Discussed colonoscopy. Pt will check with GYN if clear to do.   The following portions of the patient's history were reviewed and updated as appropriate: allergies, current medications, past family history, past medical history, past social history, past surgical history and problem list.    Past Medical History:   Diagnosis Date   • Endometrial cancer (CMS/HCC) 2018   • Essential hypertension 2018      Past Surgical History:   Procedure Laterality Date   • D&C HYSTEROSCOPY MYOSURE  2018   • TOTAL LAPAROSCOPIC HYSTERECTOMY WITH DAVINCI ROBOT  2018    laproscopic with BSO, lymph node dissection, left parametrectomy, lysis ureters, Dr Jaffe. Cancer   • TOTAL LAPAROSCOPIC HYSTERECTOMY, LAPAROSCOPIC NODE DISSECTION N/A 2018    Procedure: TOTAL LAPAROSCOPIC HYSTERECTOMY WITH DAVINCI ROBOT, BILATERAL SALPINGO OOPHORECTOMY, WITH RADICAL LEFT PARAMETRECTOMY, BILATERAL UTERETAL LYSIS, PELVIC LYMPH NODE DISSECTION;  Surgeon: April Jaffe MD;  Location: [facility];  Service: DaVinci   • TUBAL ABDOMINAL LIGATION        Family History   Problem Relation Age of Onset   • Breast cancer Cousin 55        50's   • Arthritis Mother    • Heart attack Mother    • Cancer Father         prostate   • Heart attack Father    • Hypertension Father    • Stroke Father    • Diabetes Sister         type 1   • Diabetes Brother         type 1   • Stroke Paternal Grandmother    • Ovarian cancer Neg Hx       Social History     Socioeconomic History   • Marital status:      Spouse name: Not on file   • Number of children: Not on file   • Years of education: Not on file   • Highest education level: Not on file   Tobacco Use   • Smoking status: Former Smoker     Packs/day: 0.75     Years: 12.00     Pack years: 9.00     Types: Cigarettes     Last attempt to quit:      Years since quittin.5   • Smokeless tobacco: Never Used   Substance  and Sexual Activity   • Alcohol use: No   • Drug use: No   • Sexual activity: Yes     Partners: Male      Allergies   Allergen Reactions   • Lisinopril Cough       Review of Systems   Constitutional: Negative.  Negative for chills, diaphoresis, fatigue, fever and malaise/fatigue.   HENT: Positive for postnasal drip and rhinorrhea. Negative for ear pain, nosebleeds, sinus pressure, sneezing and sore throat.    Eyes: Negative.  Negative for blurred vision, redness and itching.   Respiratory: Negative.  Negative for cough, shortness of breath and wheezing.    Cardiovascular: Negative.  Negative for chest pain, palpitations, orthopnea and PND.   Gastrointestinal: Positive for diarrhea (occasional). Negative for abdominal pain, constipation, nausea and vomiting.   Endocrine: Negative.  Negative for cold intolerance and heat intolerance.   Genitourinary: Negative.  Negative for dysuria, frequency, hematuria and urgency.   Musculoskeletal: Positive for arthralgias. Negative for back pain, myalgias and neck pain.   Skin: Negative.  Negative for color change and rash.   Allergic/Immunologic: Positive for environmental allergies.   Neurological: Negative.  Negative for dizziness, focal weakness, syncope, light-headedness and headaches.   Hematological: Negative.  Negative for adenopathy. Does not bruise/bleed easily.   Psychiatric/Behavioral: Negative.  Negative for dysphoric mood. The patient is not nervous/anxious.        Objective   Physical Exam   Constitutional: She is oriented to person, place, and time. She appears well-developed.   HENT:   Head: Normocephalic.   Right Ear: External ear normal.   Left Ear: External ear normal.   Nose: Nose normal.   Eyes: Conjunctivae, EOM and lids are normal. Pupils are equal, round, and reactive to light.   Neck: Trachea normal and normal range of motion. Neck supple. Carotid bruit is not present. No thyroid mass and no thyromegaly present.   Cardiovascular: Normal rate and regular  rhythm.   No murmur heard.  Pulmonary/Chest: Effort normal and breath sounds normal. No respiratory distress. She has no decreased breath sounds. She has no wheezes. She has no rhonchi. She has no rales. She exhibits no tenderness.   Abdominal: Soft. Bowel sounds are normal. There is no tenderness.   Musculoskeletal: Normal range of motion.   Neurological: She is alert and oriented to person, place, and time.   Skin: Skin is warm and dry.   Psychiatric: She has a normal mood and affect. Her behavior is normal.   Nursing note and vitals reviewed.      Assessment/Plan   Tiarra was seen today for hypertension.    Diagnoses and all orders for this visit:    Endometrial cancer (CMS/HCC)    Essential hypertension  -     losartan (COZAAR) 50 MG tablet; Take 1 tablet by mouth Daily.  -     Comprehensive Metabolic Panel  -     Lipid Panel  -     TSH  -     CBC & Differential    Malignant neoplasm of endocervix (CMS/HCC)    High blood triglycerides  -     Lipid Panel    Vitamin D deficiency  -     Vitamin D 25 Hydroxy      Pt to call if GYN ok's colonoscopy             Current Outpatient Medications:   •  cetirizine (ZyrTEC) 10 MG tablet, Take 10 mg by mouth Every Other Day., Disp: , Rfl:   •  Cholecalciferol (VITAMIN D-3 PO), Take 2,000 Units by mouth Daily., Disp: , Rfl:   •  losartan (COZAAR) 50 MG tablet, Take 1 tablet by mouth Daily., Disp: 90 tablet, Rfl: 1  •  Omega-3 Fatty Acids (FISH OIL) 1000 MG capsule capsule, Take  by mouth Daily With Breakfast., Disp: , Rfl:     Return in about 6 months (around 1/8/2020), or if symptoms worsen or fail to improve, for Annual.

## 2019-07-11 ENCOUNTER — TELEPHONE (OUTPATIENT)
Dept: INTERNAL MEDICINE | Facility: CLINIC | Age: 54
End: 2019-07-11

## 2019-07-11 NOTE — TELEPHONE ENCOUNTER
----- Message from Fallon RAY MD sent at 7/10/2019  5:40 PM EDT -----  Blood count small red cells persist.  Patient is not anemic.  White count is normal.  Triglycerides remain high.  LDL has increased slightly but is still acceptable.  Very low density lipoprotein has increased and is high.  Vitamin D, thyroid, metabolic panel are all acceptable.  Low animal fat low sugar low alcohol diet.  Need to strongly consider adding either a statin or fenofibrate.

## 2019-07-15 NOTE — TELEPHONE ENCOUNTER
PN of results, verbalized understanding. But does NOT want to add a statin and will tighten up diet.

## 2019-08-12 NOTE — PROGRESS NOTES
Dr Santacruz's Office Hours:  Monday: 10:30 - 6:00  Tuesday: 7:00 - 2:30   Wednesday 7:00 - 2:30  Thursday: 7:30 - 3:00  Friday 7:30 - 3:00  Saturday(every 4th): 7:00-11:00    To review your office visit summary and see your test results, please sign up for the [facility] [email or web address].  Patient Education   Cefdinir Oral capsule  What is this medicine?  CEFDINIR (SEF di ner) is a cephalosporin antibiotic. It is used to treat certain kinds of bacterial infections. It will not work for colds, flu, or other viral infections.  This medicine may be used for other purposes; ask your health care provider or pharmacist if you have questions.  What should I tell my health care provider before I take this medicine?  They need to know if you have any of these conditions:  · bleeding problems  · kidney disease  · stomach or intestine problems (especially colitis)  · an unusual or allergic reaction to cefdinir, other cephalosporin antibiotics, penicillin, penicillamine, other foods, dyes or preservatives  · pregnant or trying to get pregnant  · breast-feeding  How should I use this medicine?  Take this medicine by mouth. Swallow it with a drink of water. Follow the directions on the prescription label. You can take it with or without food. If it upsets your stomach it may help to take it with food. Take your doses at regular intervals. Do not take it more often than directed. Finish all the medicine you are prescribed even if you think your infection is better.  Talk to your pediatrician regarding the use of this medicine in children. Special care may be needed.  Overdosage: If you think you have taken too much of this medicine contact a poison control center or emergency room at once.  NOTE: This medicine is only for you. Do not share this medicine with others.  What if I miss a dose?  If you miss a dose, take it as soon as you can. If it is almost time for your next dose, take only that dose. Do not take double  Tiarra Becerra  [id number]  1965      Reason for visit:  Endometrial cancer    History of present illness:  The patient is a 54 y.o. year old female who presents today for treatment and evaluation of the above issues.    Patient is now s/p completion of RT for multifocal endometrial cancer with extensive JAVIER involvement.  She tolerated her treatment well.  She continues to have some mild GI toxicity which is improving and notes normal urinary function.  She feels well and notes good energy.  She is sexually active and denies dyspareunia.  She is using vaginal dilator.    Oncologic History:     Endometrial cancer (CMS/HCC)    11/28/2018 Initial Diagnosis     Endometrial cancer (CMS/HCC)         11/30/2018 Surgery     Surgery         TOTAL LAPAROSCOPIC HYSTERECTOMY WITH DAVINCI ROBOT, BILATERAL SALPINGO OOPHORECTOMY, WITH RADICAL LEFT PARAMETRECTOMY, LEFT UTERETAL LYSIS, PELVIC LYMPH NODE SAMPLING  Pathology:  Infiltrating endometrioid adenocarcinoma, moderate to poorly differentiated with primary involvement of lower uterine segment.  Tumor size 3.0 x 3.0 x 2.0 cm.  1.1 cm of invasion with 1.4 cm of total myometrial thickness.  Focal endometrial involvement with exophytic tumor measuring 0.6 x 0.3 x 0.3 cm.  Myometrial adenomyosis and small leiomyoma.  Chronic cervicitis.  Right parasalpingeal and left ovarian endometriosis.  Ovarian physiologic changes right and left fallopian tube prior ligation.  Right and left parametrial tissues negative for tumor.  Stage [id number]                      1/15/2019 - 2/18/2019 Radiation     Radiation OncologyTreatment Course:  Tiarra Becerra received 4500 cGy in 25 fractions to pelvis via External Beam Radiation - EBRT.         2/26/2019 - 2/28/2019 Radiation     Radiation OncologyTreatment Course:  Tiarra Becerra received 1200 cGy in 2 fractions to vagina via High Dose Radiation - HDR.              Past Medical History:   Diagnosis Date   • Endometrial cancer (CMS/HCC) 11/28/2018   •  Essential hypertension 2018       Past Surgical History:   Procedure Laterality Date   • D&C HYSTEROSCOPY MYOSURE  2018   • TOTAL LAPAROSCOPIC HYSTERECTOMY WITH DAVINCI ROBOT  2018    laproscopic with BSO, lymph node dissection, left parametrectomy, lysis ureters, Dr Jaffe. Cancer   • TOTAL LAPAROSCOPIC HYSTERECTOMY, LAPAROSCOPIC NODE DISSECTION N/A 2018    Procedure: TOTAL LAPAROSCOPIC HYSTERECTOMY WITH DAVINCI ROBOT, BILATERAL SALPINGO OOPHORECTOMY, WITH RADICAL LEFT PARAMETRECTOMY, BILATERAL UTERETAL LYSIS, PELVIC LYMPH NODE DISSECTION;  Surgeon: April Jaffe MD;  [facility]  Service: [facility]   • TUBAL ABDOMINAL LIGATION         MEDICATIONS: The current medication list was reviewed with the patient and updated in the EMR this date per the Medical Assistant. Medication dosages and frequencies were confirmed to be accurate.      Allergies:  is allergic to lisinopril.    Social History:   Social History     Socioeconomic History   • Marital status:      Spouse name: Not on file   • Number of children: Not on file   • Years of education: Not on file   • Highest education level: Not on file   Tobacco Use   • Smoking status: Former Smoker     Packs/day: 0.75     Years: 12.00     Pack years: 9.00     Types: Cigarettes     Last attempt to quit:      Years since quittin.4   • Smokeless tobacco: Never Used   Substance and Sexual Activity   • Alcohol use: No   • Drug use: No   • Sexual activity: Yes     Partners: Male       Family History:    Family History   Problem Relation Age of Onset   • Breast cancer Cousin 55        50's   • Arthritis Mother    • Heart attack Mother    • Cancer Father         prostate   • Heart attack Father    • Hypertension Father    • Stroke Father    • Diabetes Sister         type 1   • Diabetes Brother         type 1   • Stroke Paternal Grandmother    • Ovarian cancer Neg Hx        Health Maintenance:    Health Maintenance   Topic Date  or extra doses.  What may interact with this medicine?  · antacids that contain aluminum or magnesium  · iron supplements  · other antibiotics  · probenecid  This list may not describe all possible interactions. Give your health care provider a list of all the medicines, herbs, non-prescription drugs, or dietary supplements you use. Also tell them if you smoke, drink alcohol, or use illegal drugs. Some items may interact with your medicine.  What should I watch for while using this medicine?  Tell your doctor or health care professional if your symptoms do not get better in a few days.  If you are diabetic you may get a false-positive result for sugar in your urine. Check with your doctor or health care professional before you change your diet or the dose of your diabetes medicine.  What side effects may I notice from receiving this medicine?  Side effects that you should report to your doctor or health care professional as soon as possible:  · allergic reactions like skin rash, itching or hives, swelling of the face, lips, or tongue  · breathing problems  · fever or chills  · redness, blistering, peeling or loosening of the skin, including inside the mouth  · seizures  · severe or watery diarrhea  · sore throat  · swollen joints  · trouble passing urine or change in the amount of urine  · unusual bleeding or bruising  · unusually weak or tired  Side effects that usually do not require medical attention (report to your doctor or health care professional if they continue or are bothersome):  · constipation  · dizziness  · gas or heartburn  · headache  · loss of appetite  · nausea or vomiting  · stomach pain  · stool discoloration  · vaginal itching  This list may not describe all possible side effects. Call your doctor for medical advice about side effects. You may report side effects to FDA at 4-677-FDA-7891.  Where should I keep my medicine?  Keep out of the reach of children.  Store at room temperature between 15  Due   • COLONOSCOPY  09/13/2016   • ANNUAL PHYSICAL  09/14/2017   • PAP SMEAR  09/09/2018   • INFLUENZA VACCINE  08/01/2019   • LIPID PANEL  03/25/2020   • MAMMOGRAM  05/31/2020   • TDAP/TD VACCINES (2 - Td) 09/07/2021   • HEPATITIS C SCREENING  Completed   • ZOSTER VACCINE  Completed         Review of Systems   Constitutional: Positive for fatigue. Negative for activity change, appetite change, chills, diaphoresis, fever and unexpected weight change.   HENT: Negative for congestion, ear pain, hearing loss and sore throat.    Eyes: Negative for photophobia, discharge, redness, itching and visual disturbance.   Respiratory: Negative for apnea, cough, shortness of breath and wheezing.    Cardiovascular: Negative for chest pain, palpitations and leg swelling.   Gastrointestinal: Positive for diarrhea. Negative for abdominal distention, blood in stool, constipation, nausea and vomiting. Abdominal pain: heartburn.   Endocrine: Negative for cold intolerance and heat intolerance.   Genitourinary: Negative for dyspareunia, dysuria, frequency, genital sores, hematuria, pelvic pain, urgency, vaginal bleeding, vaginal discharge and vaginal pain.   Musculoskeletal: Negative for arthralgias, back pain, gait problem and joint swelling.   Skin: Negative for rash and wound.   Allergic/Immunologic: Negative for food allergies and immunocompromised state.   Neurological: Negative for dizziness, seizures, syncope, weakness, light-headedness, numbness and headaches.   Hematological: Negative for adenopathy. Does not bruise/bleed easily.   Psychiatric/Behavioral: Negative for confusion, dysphoric mood and sleep disturbance. The patient is not nervous/anxious.        Physical Exam    Vitals:    06/06/19 1749   BP: 175/86   Pulse: 71   Resp: 16   Temp: 97.5 °F (36.4 °C)   SpO2: 99%   Weight: 109 kg (241 lb)   PainSc: 0-No pain     Body mass index is 38.9 kg/m².    Wt Readings from Last 3 Encounters:   06/06/19 109 kg (241 lb)   03/25/19  and 30 degrees C (59 and 86 degrees F). Throw the medicine away after the expiration date.  NOTE:This sheet is a summary. It may not cover all possible information. If you have questions about this medicine, talk to your doctor, pharmacist, or health care provider. Copyright© 2016 [employer]  Patient Education     Guaifenesin; Hydrocodone oral syrup  Brand Names: Marcellojennifer ANDREZAUREON  What is this medicine?  GUAIFENESIN; HYDROCODONE (gwye FEN e sin; linda droe KOE done) helps to temporarily stop or reduce a dry and nonproductive cough.  How should I use this medicine?  Take this medicine by mouth with a full glass of water. Follow the directions on the prescription label. Use a specially marked spoon or container to measure each dose. Ask your pharmacist if you do not have one. Household spoons are not accurate. You can take it with or without food. If it upsets your stomach, take it with food. Take your medicine at regular intervals. Do not take it more often than directed.  A special MedGuide will be given to you by the pharmacist with each prescription and refill. Be sure to read this information carefully each time.  Talk to your pediatrician regarding the use of this medicine in children. This medicine is not approved for use in children.  What side effects may I notice from receiving this medicine?  Side effects that you should report to your doctor or health care professional as soon as possible:  · allergic reactions like skin rash, itching or hives, swelling of the face, lips, or tongue  · breathing problems  · confusion  · signs and symptoms of low blood pressure like dizziness; feeling faint or lightheaded, falls; unusually weak or tired  · trouble passing urine or change in the amount of urine  Side effects that usually do not require medical attention (report to your doctor or health care professional if they continue or are bothersome):  · constipation  · dry mouth  · nausea, vomiting  · tiredness  What  110 kg (242 lb 3.2 oz)   03/22/19 110 kg (242 lb 8 oz)         GENERAL: Alert, well-appearing female appearing her stated age who is in no apparent distress.   HEENT: Sclera anicteric. Head normocephalic, atraumatic. Mucus membranes moist.   NECK: Trachea midline, supple, without masses.  No thyromegaly.   BREASTS: Deferred  CARDIOVASCULAR: Normal rate, regular rhythm, no murmurs, rubs, or gallops.  No peripheral edema.  RESPIRATORY: Clear to auscultation bilaterally, normal respiratory effort  BACK:  No CVA tenderness, no vertebral tenderness on palpation  GASTROINTESTINAL:  Abdomen is soft, non-tender, non-distended, no rebound or guarding, no masses, or hernias. No HSM.    SKIN:  Warm, dry, well-perfused.  All visible areas intact.  No rashes, lesions, ulcers.  PSYCHIATRIC: AO x3, with appropriate affect, normal thought processes.  NEUROLOGIC: No focal deficits.  Moves extremities well.  MUSCULOSKELETAL: Normal gait and station.   EXTREMITIES:   No cyanosis, clubbing, symmetric.  LYMPHATICS:  No cervical or inguinal adenopathy noted.     PELVIC exam:    GYNECOLOGIC:  External genitalia are free from lesion. On speculum examination, the vaginal cuff was intact and no lesions were appreciated.  On bimanual examination, no fullness was appreciated.  Uterus, cervix and adnexa were absent.  There was no significant tenderness.  Rectovaginal exam was deferred.    ECOG PS 0    PROCEDURES:      Diagnostic Data:      No Images in the past 120 days found..    Lab Results   Component Value Date    WBC 4.87 03/25/2019    HGB 12.7 03/25/2019    HCT 40.3 03/25/2019    MCV 79.6 (L) 03/25/2019     03/25/2019    NEUTROABS 3.90 03/25/2019    GLUCOSE 106 (H) 03/25/2019    BUN 13 03/25/2019    CREATININE 0.73 03/25/2019    EGFRIFNONA 83 03/25/2019     03/25/2019    K 4.2 03/25/2019     03/25/2019    CO2 27.0 03/25/2019    CALCIUM 9.6 03/25/2019    ALBUMIN 4.44 03/25/2019    AST 16 03/25/2019    ALT 23 03/25/2019  may interact with this medicine?  Do not take this medicine with any of the following medications:  · alcohol  · antihistamines for allergy, cough and cold  · certain medicines for anxiety or sleep  · certain medicines for depression like amitriptyline, fluoxetine, sertraline  · certain medicines for seizures like carbamazepine, phenobarbital, phenytoin, primidone  · general anesthetics like halothane, isoflurane, methoxyflurane, propofol  · local anesthetics like lidocaine, pramoxine, tetracaine  · MAOIs like Carbex, Eldepryl, Marplan, Nardil, and Parnate  · other narcotic medicines (opiates) for pain or cough  · phenothiazines like chlorpromazine, mesoridazine, prochlorperazine, thioridazine  This medicine may also interact with the following medications:  · antiviral medicines for HIV and AIDS  · atropine  · certain antibiotics like clarithromycin, erythromycin  · certain medicines for bladder problems like oxybutynin, tolterodine  · certain medicines for fungal infections like ketoconazole and itraconazole  · certain medicines for Parkinson's disease like benztropine, trihexyphenidyl  · certain medicines for stomach problems like dicyclomine, hyoscyamine  · certain medicines for travel sickness like scopolamine  · ipratropium  · rifampin  What if I miss a dose?  If you miss a dose, take it as soon as you can. If it is almost time for your next dose, take only that dose. Do not take double or extra doses.  Where should I keep my medicine?  Keep out of the reach of children. This medicine can be abused. Keep this medicine in a safe place to protect it from theft. Do not share this medicine with anyone. Selling or giving away this medicine is dangerous and is against the law.  This medicine may cause accidental overdose and death if taken by other adults, children, or pets. Mix any unused medicine with a substance like cat littler or coffee grounds. Then throw the medicine away in a sealed container like a sealed     BILITOT 0.4 03/25/2019     No results found for:         Assessment/Plan   This is a 54 y.o. woman with the above history of endometrial cancer, clinically concerning at the time of surgery for cervical cancer    Patient doing well; MANNY.  Continue observation.    Post treatment imaging ordered.    Pain assessment was performed today as a part of patient’s care.  For patients with pain related to surgery, gynecologic malignancy or cancer treatment, the plan is as noted in the assessment/plan.  For patients with pain not related to these issues, they are to seek any further needed care from a more appropriate provider, such as PCP.    Orders Placed This Encounter   Procedures   • CT Abdomen Pelvis With Contrast     Standing Status:   Future     Standing Expiration Date:   6/5/2020     Order Specific Question:   Will Oral Contrast be needed for this procedure?     Answer:   Yes   • CT Chest With Contrast     Standing Status:   Future     Standing Expiration Date:   6/5/2020       FOLLOW UP: 6 months for survivorship    I personally spent 15> minutes face-to-face with the patient of which >50% was spent performing counseling/coordiantion of care.    Electronically Signed by: April Jaffe MD  Date: 6/6/2019        bag or a coffee can with a lid. Do not use the medicine after the expiration date.  Store at room temperature between 20 and 25 degrees C (68 and 77 degrees F).  What should I tell my health care provider before I take this medicine?  They need to know if you have any of these conditions:  · Jeffrey's disease  · diabetes  · drug abuse or addiction  · head injury  · kidney disease  · liver disease  · lung or breathing disease, like asthma  · problems urinating  · stomach or intestine problems  · thyroid disease  · an unusual or allergic reaction to guaifenesin, hydrocodone, other medicines, foods, dyes, or preservatives  · pregnant or trying to get pregnant  · breast-feeding  What should I watch for while using this medicine?  Use exactly as directed by your doctor or health care professional. Do not take more than the recommended dose. You may develop tolerance to this medicine if you take it for a long time. Tolerance means that you will get less cough relief with time. Tell your doctor or health care professional if your symptoms do not improve or if they get worse.  If you have been taking this medicine for a long time, do not suddenly stop taking it because you may develop a severe reaction. Your body becomes used to the medicine. This does NOT mean you are addicted. Addiction is a behavior related to getting and using a drug for a nonmedical reason. If your doctor wants you to stop the medicine, the dose will be slowly lowered over time to avoid any side effects.  There are different types of narcotic medicines (opiates). If you take more than one type at the same time or if you are taking another medicine that also causes drowsiness, you may have more side effects. Give your health care provider a list of all medicines you use. Your doctor will tell you how much medicine to take. Do not take more medicine than directed. Call emergency for help if you have problems breathing or unusual sleepiness.  You may get  drowsy or dizzy. Do not drive, use machinery, or do anything that needs mental alertness until you know how this medicine affects you. Do not stand or sit up quickly, especially if you are an older patient. This reduces the risk of dizzy or fainting spells. Alcohol may interfere with the effect of this medicine. Avoid alcoholic drinks.  The medicine may cause constipation. Try to have a bowel movement at least every 2 to 3 days. If you do not have a bowel movement for 3 days, call your doctor or health care professional.  Your mouth may get dry. Chewing sugarless gum or sucking hard candy, and drinking plenty of water may help. Contact your doctor if the problem does not go away or is severe.  NOTE:This sheet is a summary. It may not cover all possible information. If you have questions about this medicine, talk to your doctor, pharmacist, or health care provider. Copyright© 2018 [employer]

## 2019-09-23 ENCOUNTER — HOSPITAL ENCOUNTER (OUTPATIENT)
Dept: RADIATION ONCOLOGY | Facility: HOSPITAL | Age: 54
Setting detail: RADIATION/ONCOLOGY SERIES
Discharge: HOME | End: 2019-09-23

## 2019-09-23 ENCOUNTER — OFFICE VISIT (OUTPATIENT)
Dept: RADIATION ONCOLOGY | Facility: HOSPITAL | Age: 54
End: 2019-09-23

## 2019-09-23 VITALS
DIASTOLIC BLOOD PRESSURE: 107 MMHG | WEIGHT: 239 LBS | SYSTOLIC BLOOD PRESSURE: 172 MMHG | BODY MASS INDEX: 38.58 KG/M2 | TEMPERATURE: 97.8 F | OXYGEN SATURATION: 97 % | HEART RATE: 83 BPM | RESPIRATION RATE: 18 BRPM

## 2019-09-23 DIAGNOSIS — C54.1 ENDOMETRIAL CANCER (HCC): ICD-10-CM

## 2019-09-23 PROCEDURE — G0463 HOSPITAL OUTPT CLINIC VISIT: HCPCS | Performed by: RADIOLOGY

## 2019-09-23 NOTE — PROGRESS NOTES
FOLLOW UP NOTE    PATIENT:                                                      Tiarra Becerra  MEDICAL RECORD #:                        [id number]  :                                                          1965  COMPLETION DATE:    2019  DIAGNOSIS:     Cancer Staging  Endometrial cancer (CMS/[facility])  Staging form: Corpus Uteri - Carcinoma And Carcinosarcoma, AJCC 8th Edition  - Clinical stage from 2018: FIGO Stage I (cT1, cN0, cM0) - Signed by April Jaffe MD on 2018    Malignant neoplasm of endocervix (CMS/HCC)  Staging form: Cervix Uteri, AJCC 8th Edition  - Clinical stage from 2018: FIGO Stage IIB (cT2b, cN0, cM0) - Signed by April Jaffe MD on 2018    BRIEF HISTORY:  Mrs. Becerra returns to clinic today for a 6 month follow-up visit for a FIGO stage 1B adenocarcinoma of the lower uterine segment arising in the setting of endometriosis with multiple pelvic peritoneal deposits of endometriosis.  She completed a course of pelvic radiation followed by intracavitary brachytherapy on .  She states she is doing very well, and denies any vaginal bleeding or discharge.  She also denies any genitourinary symptoms.  She states that at this point she feels like she is back to normal.  She denies any bowel symptoms or complaints, and specifically denies any vaginal bleeding or discharge.  CT scans were obtained in  which were negative.    MEDICATIONS: Medication reconciliation for the patient was reviewed and confirmed in the electronic medical record.    Review of Systems   Constitutional: Positive for fatigue.   HENT:   Positive for tinnitus.    Musculoskeletal: Positive for arthralgias (left leg).   All other systems reviewed and are negative.      KPS 80%    Physical Exam   Constitutional: She is oriented to person, place, and time. She appears well-developed and well-nourished. No distress.   HENT:   Head: Normocephalic and atraumatic.   Mouth/Throat:  Oropharynx is clear and moist.   Eyes: Conjunctivae and EOM are normal. Pupils are equal, round, and reactive to light.   Neck: Normal range of motion. Neck supple.   Cardiovascular: Normal rate and regular rhythm. Exam reveals no friction rub.   No murmur heard.  Pulmonary/Chest: Effort normal and breath sounds normal. She has no wheezes.   Abdominal: Soft. Bowel sounds are normal. She exhibits no distension and no mass. There is no tenderness.   Well healed laparoscopic port sites without any abnormal nodularity.   Genitourinary:   Genitourinary Comments: Normal appearing vaginal cuff without nodularity, ulcerations or masses.  The remainder of the vaginal exam is normal.  No bleeding.   Musculoskeletal: Normal range of motion. She exhibits no edema.   Lymphadenopathy:     She has no cervical adenopathy.   Neurological: She is alert and oriented to person, place, and time.   Skin: Skin is warm and dry.   Psychiatric: She has a normal mood and affect. Her behavior is normal. Judgment and thought content normal.   Nursing note and vitals reviewed.      VITAL SIGNS:   Vitals:    09/23/19 1508   BP: (!) 172/107  Comment: pt did not want recheck   Pulse: 83   Resp: 18   Temp: 97.8 °F (36.6 °C)   TempSrc: Temporal   SpO2: 97%   Weight: 108 kg (239 lb)   PainSc: 0-No pain       The following portions of the patient's history were reviewed and updated as appropriate: allergies, current medications, past family history, past medical history, past social history, past surgical history and problem list.         Tiarra was seen today for uterine cancer.    Diagnoses and all orders for this visit:    Endometrial cancer (CMS/[facility])         IMPRESSION:  Ms. Becerra is a 54 year old female with a history of a FIGO 1B endometrioid adenocarcinoma.  She undwerwent a hysterectomy followed by adjuvant pelvic radiation and vaginal brachytherapy.  She is 7 months out from completion of therapy, and has no evidence of recurrent disease on  exam.  She is scheduled to be seen again by Dr. Jaffe at the end of the year in December.  I scheduled her to return to my clinic 6 months later than that visit in the summer of 2020.    RECOMMENDATIONS:     Return in about 10 months (around 7/20/2020) for Office Visit.    Brennan Vincent MD    Errors in dictation may reflect use of voice recognition software and not all errors in transcription may have been detected prior to signing.

## 2019-09-27 ENCOUNTER — TELEPHONE (OUTPATIENT)
Dept: INTERNAL MEDICINE | Facility: CLINIC | Age: 54
End: 2019-09-27

## 2019-10-08 ENCOUNTER — TELEPHONE (OUTPATIENT)
Dept: INTERNAL MEDICINE | Facility: CLINIC | Age: 54
End: 2019-10-08

## 2019-10-08 DIAGNOSIS — Z83.71 FAMILY HISTORY OF POLYPS IN THE COLON: Primary | ICD-10-CM

## 2019-10-08 DIAGNOSIS — Z12.11 SCREENING FOR COLON CANCER: ICD-10-CM

## 2019-10-08 NOTE — TELEPHONE ENCOUNTER
Patient would like to have a colonoscopy by Dr Giraldo could you please place order for a screening.

## 2019-11-13 ENCOUNTER — OFFICE VISIT (OUTPATIENT)
Dept: INTERNAL MEDICINE | Facility: CLINIC | Age: 54
End: 2019-11-13

## 2019-11-13 VITALS
HEIGHT: 66 IN | SYSTOLIC BLOOD PRESSURE: 146 MMHG | HEART RATE: 74 BPM | BODY MASS INDEX: 38.89 KG/M2 | OXYGEN SATURATION: 99 % | WEIGHT: 242 LBS | TEMPERATURE: 97.4 F | DIASTOLIC BLOOD PRESSURE: 90 MMHG

## 2019-11-13 DIAGNOSIS — H53.8 BLURRY VISION, BILATERAL: ICD-10-CM

## 2019-11-13 DIAGNOSIS — Z13.820 SCREENING FOR OSTEOPOROSIS: ICD-10-CM

## 2019-11-13 DIAGNOSIS — R53.83 OTHER FATIGUE: ICD-10-CM

## 2019-11-13 DIAGNOSIS — I10 ESSENTIAL HYPERTENSION: Primary | ICD-10-CM

## 2019-11-13 DIAGNOSIS — R71.8 MICROCYTOSIS: ICD-10-CM

## 2019-11-13 LAB
ALBUMIN SERPL-MCNC: 4.3 G/DL (ref 3.5–5.2)
ALBUMIN/GLOB SERPL: 1.5 G/DL
ALP SERPL-CCNC: 123 U/L (ref 39–117)
ALT SERPL W P-5'-P-CCNC: 20 U/L (ref 1–33)
ANION GAP SERPL CALCULATED.3IONS-SCNC: 13.1 MMOL/L (ref 5–15)
AST SERPL-CCNC: 17 U/L (ref 1–32)
BASOPHILS # BLD AUTO: 0.03 10*3/MM3 (ref 0–0.2)
BASOPHILS NFR BLD AUTO: 0.7 % (ref 0–1.5)
BILIRUB SERPL-MCNC: 0.3 MG/DL (ref 0.2–1.2)
BUN BLD-MCNC: 10 MG/DL (ref 6–20)
BUN/CREAT SERPL: 13.7 (ref 7–25)
CALCIUM SPEC-SCNC: 9.6 MG/DL (ref 8.6–10.5)
CHLORIDE SERPL-SCNC: 98 MMOL/L (ref 98–107)
CO2 SERPL-SCNC: 27.9 MMOL/L (ref 22–29)
CREAT BLD-MCNC: 0.73 MG/DL (ref 0.57–1)
DEPRECATED RDW RBC AUTO: 41.3 FL (ref 37–54)
EOSINOPHIL # BLD AUTO: 0.1 10*3/MM3 (ref 0–0.4)
EOSINOPHIL NFR BLD AUTO: 2.2 % (ref 0.3–6.2)
ERYTHROCYTE [DISTWIDTH] IN BLOOD BY AUTOMATED COUNT: 14.1 % (ref 12.3–15.4)
FOLATE SERPL-MCNC: 10.5 NG/ML (ref 4.78–24.2)
GFR SERPL CREATININE-BSD FRML MDRD: 83 ML/MIN/1.73
GLOBULIN UR ELPH-MCNC: 2.9 GM/DL
GLUCOSE BLD-MCNC: 85 MG/DL (ref 65–99)
GLUCOSE BLDC GLUCOMTR-MCNC: 62 MG/DL (ref 70–130)
HCT VFR BLD AUTO: 40.1 % (ref 34–46.6)
HGB BLD-MCNC: 13.7 G/DL (ref 12–15.9)
IMM GRANULOCYTES # BLD AUTO: 0.01 10*3/MM3 (ref 0–0.05)
IMM GRANULOCYTES NFR BLD AUTO: 0.2 % (ref 0–0.5)
IRON 24H UR-MRATE: 57 MCG/DL (ref 37–145)
IRON SATN MFR SERPL: 12 % (ref 20–50)
LYMPHOCYTES # BLD AUTO: 0.55 10*3/MM3 (ref 0.7–3.1)
LYMPHOCYTES NFR BLD AUTO: 12.2 % (ref 19.6–45.3)
MCH RBC QN AUTO: 27.8 PG (ref 26.6–33)
MCHC RBC AUTO-ENTMCNC: 34.2 G/DL (ref 31.5–35.7)
MCV RBC AUTO: 81.3 FL (ref 79–97)
MONOCYTES # BLD AUTO: 0.36 10*3/MM3 (ref 0.1–0.9)
MONOCYTES NFR BLD AUTO: 8 % (ref 5–12)
NEUTROPHILS # BLD AUTO: 3.46 10*3/MM3 (ref 1.7–7)
NEUTROPHILS NFR BLD AUTO: 76.7 % (ref 42.7–76)
NRBC BLD AUTO-RTO: 0 /100 WBC (ref 0–0.2)
PLATELET # BLD AUTO: 209 10*3/MM3 (ref 140–450)
PMV BLD AUTO: 10.7 FL (ref 6–12)
POTASSIUM BLD-SCNC: 4.5 MMOL/L (ref 3.5–5.2)
PROT SERPL-MCNC: 7.2 G/DL (ref 6–8.5)
RBC # BLD AUTO: 4.93 10*6/MM3 (ref 3.77–5.28)
SODIUM BLD-SCNC: 139 MMOL/L (ref 136–145)
T4 FREE SERPL-MCNC: 1.02 NG/DL (ref 0.93–1.7)
TIBC SERPL-MCNC: 457 MCG/DL (ref 298–536)
TRANSFERRIN SERPL-MCNC: 307 MG/DL (ref 200–360)
TSH SERPL DL<=0.05 MIU/L-ACNC: 2.82 UIU/ML (ref 0.27–4.2)
VIT B12 BLD-MCNC: 353 PG/ML (ref 211–946)
WBC NRBC COR # BLD: 4.51 10*3/MM3 (ref 3.4–10.8)

## 2019-11-13 PROCEDURE — 84439 ASSAY OF FREE THYROXINE: CPT | Performed by: FAMILY MEDICINE

## 2019-11-13 PROCEDURE — 82962 GLUCOSE BLOOD TEST: CPT | Performed by: FAMILY MEDICINE

## 2019-11-13 PROCEDURE — 80053 COMPREHEN METABOLIC PANEL: CPT | Performed by: FAMILY MEDICINE

## 2019-11-13 PROCEDURE — 84443 ASSAY THYROID STIM HORMONE: CPT | Performed by: FAMILY MEDICINE

## 2019-11-13 PROCEDURE — 83021 HEMOGLOBIN CHROMOTOGRAPHY: CPT | Performed by: FAMILY MEDICINE

## 2019-11-13 PROCEDURE — 85660 RBC SICKLE CELL TEST: CPT | Performed by: FAMILY MEDICINE

## 2019-11-13 PROCEDURE — 84466 ASSAY OF TRANSFERRIN: CPT | Performed by: FAMILY MEDICINE

## 2019-11-13 PROCEDURE — 82607 VITAMIN B-12: CPT | Performed by: FAMILY MEDICINE

## 2019-11-13 PROCEDURE — 99213 OFFICE O/P EST LOW 20 MIN: CPT | Performed by: FAMILY MEDICINE

## 2019-11-13 PROCEDURE — 83540 ASSAY OF IRON: CPT | Performed by: FAMILY MEDICINE

## 2019-11-13 PROCEDURE — 85025 COMPLETE CBC W/AUTO DIFF WBC: CPT | Performed by: FAMILY MEDICINE

## 2019-11-13 PROCEDURE — 82746 ASSAY OF FOLIC ACID SERUM: CPT | Performed by: FAMILY MEDICINE

## 2019-11-13 RX ORDER — LOSARTAN POTASSIUM 100 MG/1
100 TABLET ORAL DAILY
Qty: 30 TABLET | Refills: 2 | Status: SHIPPED | OUTPATIENT
Start: 2019-11-13 | End: 2020-01-08 | Stop reason: SDUPTHER

## 2019-11-13 NOTE — PROGRESS NOTES
"Subjective   Tiarra Becerra is a 54 y.o. female.     Chief Complaint   Patient presents with   • Hypertension     she has a monitor at home and has been running higher at home. 153/100s   • Headache     has had a headache for the past 2 days.denies any otc medication was used.   • Tinnitus     has been going on for a couple of weeks.       Visit Vitals  /90 (BP Location: Right arm, Cuff Size: Large Adult)   Pulse 74   Temp 97.4 °F (36.3 °C)   Ht 167.6 cm (66\")   Wt 110 kg (242 lb)   LMP 01/02/2018   SpO2 99%   BMI 39.06 kg/m²       Wt Readings from Last 3 Encounters:   11/13/19 110 kg (242 lb)   09/23/19 108 kg (239 lb)   07/08/19 107 kg (235 lb 9.6 oz)       Hypertension   This is a chronic problem. The current episode started more than 1 year ago. The problem has been gradually worsening since onset. The problem is uncontrolled. Associated symptoms include blurred vision (occasional), headaches and malaise/fatigue. Pertinent negatives include no anxiety, chest pain, neck pain, orthopnea, palpitations, peripheral edema, PND, shortness of breath or sweats. There are no associated agents to hypertension. Risk factors for coronary artery disease include dyslipidemia, obesity and post-menopausal state. Current antihypertension treatment includes angiotensin blockers. The current treatment provides moderate improvement. Compliance problems include diet and exercise.  There is no history of angina, kidney disease, CAD/MI, CVA, heart failure, left ventricular hypertrophy, PVD or retinopathy. There is no history of sleep apnea or a thyroid problem.   Headache    This is a new (pt states not a migraine) problem. The current episode started yesterday. The problem occurs constantly. The problem has been unchanged. The pain is located in the bilateral region. The quality of the pain is described as dull. The pain is at a severity of 3/10. Associated symptoms include blurred vision (occasional), rhinorrhea, sinus pressure " and tinnitus (left worse than right). Pertinent negatives include no abdominal pain, abnormal behavior, anorexia, back pain, coughing, dizziness, drainage, ear pain, eye pain, eye redness, eye watering, facial sweating, fever, hearing loss, insomnia, loss of balance, muscle aches, nausea, neck pain, numbness, phonophobia, photophobia, scalp tenderness, seizures, sore throat, swollen glands, tingling, visual change, vomiting, weakness or weight loss. Nothing aggravates the symptoms. She has tried nothing for the symptoms. The treatment provided no relief. Her past medical history is significant for hypertension, migraine headaches, migraines in the family and obesity. There is no history of cancer, cluster headaches, immunosuppression, pseudotumor cerebri, recent head traumas, sinus disease or TMJ.   Tinnitus   This is a new problem. The current episode started more than 1 year ago. The problem occurs constantly. The problem has been waxing and waning. Associated symptoms include arthralgias (left hip), congestion (stable on allergy meds), fatigue, headaches and urinary symptoms. Pertinent negatives include no abdominal pain, anorexia, change in bowel habit, chest pain, chills, coughing, diaphoresis, fever, joint swelling, myalgias, nausea, neck pain, numbness, rash, sore throat, swollen glands, vertigo, visual change, vomiting or weakness. Nothing aggravates the symptoms. She has tried nothing for the symptoms. The treatment provided no relief.   Fatigue   This is a new problem. The current episode started more than 1 month ago (5 months ago). The problem occurs constantly. The problem has been unchanged. Associated symptoms include arthralgias (left hip), congestion (stable on allergy meds), fatigue, headaches and urinary symptoms. Pertinent negatives include no abdominal pain, anorexia, change in bowel habit, chest pain, chills, coughing, diaphoresis, fever, joint swelling, myalgias, nausea, neck pain, numbness,  rash, sore throat, swollen glands, vertigo, visual change, vomiting or weakness.      Pt has dull headache in temples.    Pt has not been over salting food.   Pt denies snoring.  Pt felt rested Sunday after about 10 hours of sleep.  Pt wakes frequently with  getting up at night.   The following portions of the patient's history were reviewed and updated as appropriate: allergies, current medications, past family history, past medical history, past social history, past surgical history and problem list.    Past Medical History:   Diagnosis Date   • Endometrial cancer (CMS/HCC) 11/28/2018   • Essential hypertension 8/6/2018   • Hx of radiation therapy       Past Surgical History:   Procedure Laterality Date   • D&C HYSTEROSCOPY MYOSURE  11/05/2018   • OOPHORECTOMY     • TOTAL LAPAROSCOPIC HYSTERECTOMY WITH DAVINCI ROBOT  11/30/2018    laproscopic with BSO, lymph node dissection, left parametrectomy, lysis ureters, Dr Jaffe. Cancer   • TOTAL LAPAROSCOPIC HYSTERECTOMY, LAPAROSCOPIC NODE DISSECTION N/A 11/30/2018    Procedure: TOTAL LAPAROSCOPIC HYSTERECTOMY WITH DAVINCI ROBOT, BILATERAL SALPINGO OOPHORECTOMY, WITH RADICAL LEFT PARAMETRECTOMY, BILATERAL UTERETAL LYSIS, PELVIC LYMPH NODE DISSECTION;  Surgeon: April Jaffe MD;  Location: [facility];  Service: DaVinci   • TUBAL ABDOMINAL LIGATION  2006      Family History   Problem Relation Age of Onset   • Breast cancer Cousin 55        50's   • Arthritis Mother    • Heart attack Mother    • Cancer Father         prostate   • Heart attack Father    • Hypertension Father    • Stroke Father    • Diabetes Sister         type 1   • Diabetes Brother         type 1   • Stroke Paternal Grandmother    • Ovarian cancer Neg Hx       Social History     Socioeconomic History   • Marital status:      Spouse name: Not on file   • Number of children: Not on file   • Years of education: Not on file   • Highest education level: Not on file   Tobacco Use   • Smoking  status: Former Smoker     Packs/day: 0.75     Years: 12.00     Pack years: 9.00     Types: Cigarettes     Last attempt to quit:      Years since quittin.8   • Smokeless tobacco: Never Used   Substance and Sexual Activity   • Alcohol use: No   • Drug use: No   • Sexual activity: Yes     Partners: Male      Allergies   Allergen Reactions   • Lisinopril Cough       Review of Systems   Constitutional: Positive for fatigue and malaise/fatigue. Negative for chills, diaphoresis, fever and weight loss.   HENT: Positive for congestion (stable on allergy meds), rhinorrhea, sinus pressure and tinnitus (left worse than right). Negative for ear pain, hearing loss, nosebleeds, postnasal drip, sneezing and sore throat.    Eyes: Positive for blurred vision (occasional). Negative for photophobia, pain, redness and itching.   Respiratory: Negative.  Negative for cough, shortness of breath and wheezing.    Cardiovascular: Negative.  Negative for chest pain, palpitations, orthopnea and PND.   Gastrointestinal: Negative.  Negative for abdominal pain, anorexia, change in bowel habit, constipation, diarrhea, nausea and vomiting.   Endocrine: Negative.  Negative for cold intolerance and heat intolerance.   Genitourinary: Positive for frequency. Negative for dysuria, hematuria and urgency.   Musculoskeletal: Positive for arthralgias (left hip). Negative for back pain, joint swelling, myalgias and neck pain.   Skin: Negative.  Negative for color change and rash.   Allergic/Immunologic: Positive for environmental allergies.   Neurological: Positive for headaches. Negative for dizziness, vertigo, tingling, seizures, syncope, weakness, light-headedness, numbness and loss of balance.   Hematological: Negative.  Negative for adenopathy. Does not bruise/bleed easily.   Psychiatric/Behavioral: Positive for sleep disturbance ( snoring and watching TV at night). Negative for dysphoric mood. The patient is not nervous/anxious and does  not have insomnia.        Objective   Physical Exam   Constitutional: She is oriented to person, place, and time. She appears well-developed.   HENT:   Head: Normocephalic.   Right Ear: External ear normal.   Left Ear: External ear normal.   Nose: Nose normal.   Eyes: Conjunctivae, EOM and lids are normal. Pupils are equal, round, and reactive to light.   Neck: Trachea normal and normal range of motion. Neck supple. Carotid bruit is not present. No thyroid mass and no thyromegaly present.   Cardiovascular: Normal rate and regular rhythm.   No murmur heard.  Pulmonary/Chest: Effort normal and breath sounds normal. No respiratory distress. She has no decreased breath sounds. She has no wheezes. She has no rhonchi. She has no rales. She exhibits no tenderness.   Abdominal: Soft. Bowel sounds are normal. There is no tenderness.   Musculoskeletal: Normal range of motion.   Neurological: She is alert and oriented to person, place, and time.   Skin: Skin is warm and dry.   Psychiatric: She has a normal mood and affect. Her behavior is normal.   Nursing note and vitals reviewed.      Assessment/Plan   Tiarra was seen today for hypertension, headache and tinnitus.    Diagnoses and all orders for this visit:    Essential hypertension  -     Comprehensive Metabolic Panel  -     CBC & Differential  -     TSH  -     CBC Auto Differential  -     losartan (COZAAR) 100 MG tablet; Take 1 tablet by mouth Daily.    Other fatigue  -     Iron Profile  -     Comprehensive Metabolic Panel  -     CBC & Differential  -     TSH  -     Vitamin B12  -     Folate  -     T4, Free  -     CBC Auto Differential    Blurry vision, bilateral  -     POC Glucose    Microcytosis  -     Hgb. Frac. Profile    Screening for osteoporosis  -     DEXA Bone Density Axial; Future        Increase losartan to 100 mg per day.   States she breakfast. Patient to go immediately to lunch.  Patient asymptomatic with low glucose.         Current Outpatient Medications:    •  cetirizine (ZyrTEC) 10 MG tablet, Take 10 mg by mouth Every Other Day., Disp: , Rfl:   •  Cholecalciferol (VITAMIN D-3 PO), Take 2,000 Units by mouth Daily., Disp: , Rfl:   •  losartan (COZAAR) 100 MG tablet, Take 1 tablet by mouth Daily., Disp: 30 tablet, Rfl: 2  •  Omega-3 Fatty Acids (FISH OIL) 1000 MG capsule capsule, Take  by mouth Daily With Breakfast., Disp: , Rfl:     Return in about 8 weeks (around 1/8/2020), or if symptoms worsen or fail to improve, for Recheck bp in 2 weeks, Next scheduled follow up.    Recent Results (from the past 168 hour(s))   POC Glucose    Collection Time: 11/13/19 11:57 AM   Result Value Ref Range    Glucose 62 (A) 70 - 130 mg/dL

## 2019-11-18 LAB
HGB A MFR BLD: 98.1 % (ref 96.4–98.8)
HGB A2 MFR BLD COLUMN CHROM: 1.9 % (ref 1.8–3.2)
HGB C MFR BLD: 0 %
HGB F MFR BLD: 0 % (ref 0–2)
HGB FRACT BLD-IMP: NORMAL
HGB S BLD QL SOLY: NEGATIVE
HGB S MFR BLD: 0 %

## 2019-11-20 RX ORDER — SODIUM, POTASSIUM,MAG SULFATES 17.5-3.13G
2 SOLUTION, RECONSTITUTED, ORAL ORAL TAKE AS DIRECTED
Qty: 354 ML | Refills: 0 | Status: SHIPPED | OUTPATIENT
Start: 2019-11-20 | End: 2020-01-08

## 2019-11-25 ENCOUNTER — OUTSIDE FACILITY SERVICE (OUTPATIENT)
Dept: GASTROENTEROLOGY | Facility: CLINIC | Age: 54
End: 2019-11-25

## 2019-11-25 PROCEDURE — G0105 COLORECTAL SCRN; HI RISK IND: HCPCS | Performed by: INTERNAL MEDICINE

## 2019-12-02 ENCOUNTER — CLINICAL SUPPORT (OUTPATIENT)
Dept: GYNECOLOGIC ONCOLOGY | Facility: CLINIC | Age: 54
End: 2019-12-02

## 2019-12-02 VITALS
BODY MASS INDEX: 39.22 KG/M2 | SYSTOLIC BLOOD PRESSURE: 139 MMHG | HEART RATE: 72 BPM | WEIGHT: 243 LBS | DIASTOLIC BLOOD PRESSURE: 80 MMHG | RESPIRATION RATE: 14 BRPM

## 2019-12-02 DIAGNOSIS — C54.1 ENDOMETRIAL CANCER (HCC): Primary | ICD-10-CM

## 2019-12-02 PROCEDURE — 99214 OFFICE O/P EST MOD 30 MIN: CPT | Performed by: NURSE PRACTITIONER

## 2019-12-02 NOTE — PROGRESS NOTES
GYN ONCOLOGY CANCER SURVIVORSHIP VISIT    Tiarra Becerra  [id number]  1965    Chief Complaint: Follow-up (no gyn complaints)        History of present illness:  Tiarra Becerra is a 54 y.o. year old female who is here today for her Cancer Survivorship visit, see oncology history below. She reports she is feeling very well today and has no complaints. She denies vaginal bleeding, pelvic pain, and changes in bowel or bladder function. She is planning to alternate follow-up visits between our office and radiation oncology. She is scheduled for follow-up with Dr. Vincent in 7/2020.      Cancer History:      Endometrial cancer (CMS/[facility])    10/2018 Imaging     Patient presented to Dr. Davis for Annual exam with c/o new onset vaginal bleeding after amenorrhea x 8 months.   TVUS revealed large polyp vs fibroid in endocervical canal         11/5/2018 Procedure     ECC and Myosure by gynecologist. Pathology revealed grade 1 endometrioid adenocarcinoma in a background of complex atypical hyperplasia, suspicion for myoinvasion. Pt referred to Gyn Oncology         11/30/2018 Surgery     RTLH/BSO, radical left parametrectomy, left ureteral lysis, and pelvic lymph node dissection.   There was suspicion for concurrent cervical cancer at time of surgery, found to be multifocal grade 2-3 endometrioid carcinoma with extensive lower uterine segment involvement upon final pathology. Tumor invasive into over 50% of the myometrium (11/14 mm). Nodes negative. MSI testing normal. Stage IB grade 2-3         12/26/2018 Imaging     Post-op CT abdomen and pelvis negative         1/15/2019 - 2/18/2019 Radiation     Radiation OncologyTreatment Course:  Tiarra Becerra received 4500 cGy in 25 fractions to pelvis via External Beam Radiation - EBRT.         2/26/2019 - 2/28/2019 Radiation     Radiation OncologyTreatment Course:  Tiarra Becerra received 1200 cGy in 2 fractions to vagina via High Dose Radiation - HDR.         6/12/2019 Imaging     CT  chest, abdomen, pelvis negative            Past Medical History:   Diagnosis Date   • Endometrial cancer (CMS/HCC) 11/28/2018   • Essential hypertension 8/6/2018   • Hx of radiation therapy        Past Surgical History:   Procedure Laterality Date   • D&C HYSTEROSCOPY MYOSURE  11/05/2018   • OOPHORECTOMY     • TOTAL LAPAROSCOPIC HYSTERECTOMY WITH DAVINCI ROBOT  11/30/2018    laproscopic with BSO, lymph node dissection, left parametrectomy, lysis ureters, Dr Jaffe. Cancer   • TOTAL LAPAROSCOPIC HYSTERECTOMY, LAPAROSCOPIC NODE DISSECTION N/A 11/30/2018    Procedure: TOTAL LAPAROSCOPIC HYSTERECTOMY WITH DAVINCI ROBOT, BILATERAL SALPINGO OOPHORECTOMY, WITH RADICAL LEFT PARAMETRECTOMY, BILATERAL UTERETAL LYSIS, PELVIC LYMPH NODE DISSECTION;  Surgeon: April Jaffe MD;  Location: [facility];  Service: DaVinci   • TUBAL ABDOMINAL LIGATION  2006       MEDICATIONS: The current medication list was reviewed and reconciled.     Allergies:  is allergic to lisinopril.    Family History   Problem Relation Age of Onset   • Breast cancer Cousin 55        50's   • Arthritis Mother    • Heart attack Mother    • Cancer Father         prostate   • Heart attack Father    • Hypertension Father    • Stroke Father    • Diabetes Sister         type 1   • Diabetes Brother         type 1   • Stroke Paternal Grandmother    • Ovarian cancer Neg Hx          Review of Systems   Constitutional: Negative for appetite change, chills, fatigue, fever and unexpected weight change.   Respiratory: Negative for cough, shortness of breath and wheezing.    Cardiovascular: Negative for chest pain, palpitations and leg swelling.   Gastrointestinal: Negative for abdominal distention, abdominal pain, blood in stool, constipation, diarrhea, nausea and vomiting.   Endocrine: Negative.    Genitourinary: Negative for dyspareunia, dysuria, frequency, genital sores, hematuria, pelvic pain, urgency, vaginal bleeding, vaginal discharge and vaginal pain.    Musculoskeletal: Negative for arthralgias, gait problem and joint swelling.   Neurological: Negative for dizziness, seizures, syncope, weakness, light-headedness, numbness and headaches.   Hematological: Negative for adenopathy.   Psychiatric/Behavioral: Negative.        Physical Exam  Vital Signs: /80   Pulse 72   Resp 14   Wt 110 kg (243 lb)   LMP 01/02/2018   BMI 39.22 kg/m²   Pain Score    12/02/19 1303   PainSc: 0-No pain      General Appearance:  alert, cooperative, no apparent distress, appears stated age and obese   Neurologic/Psychiatric: A&O x 3, gait steady, appropriate affect   HEENT:  Normocephalic, without obvious abnormality, mucous membranes moist   Neck: Supple, symmetrical, trachea midline, no adenopathy;  No thyromegaly, masses, or tenderness   Back:   Symmetric, no curvature, ROM normal, no CVA tenderness   Lungs:   Clear to auscultation bilaterally; respirations regular, even, and unlabored bilaterally   Heart:  Regular rate and rhythm, no murmurs appreciated   Breasts:  deferred   Abdomen:   Soft, non-tender, non-distended, no organomegaly and Exam limited d/t habitus.   Lymph nodes: No cervical, supraclavicular, inguinal or axillary adenopathy noted   Extremities: Normal, atraumatic; no clubbing, cyanosis, or edema    Pelvic: External Genitalia  without lesions or skin changes  Vagina  is pink, moist, without lesions.   Vaginal Cuff  Female Vaginal Cuff: smooth, intact and without visible lesions. Changes consistent with radiation noted  Uterus  surgically absent and no palpable masses  Ovaries  surgically absent bilaterally  Parametria  smooth  Rectovaginal  Female rectovaginal: deferred     ECOG Performance Status: 0 - Asymptomatic    Procedure Note:  No notes on file      Assessment and Plan:  Tiarra was seen today for follow-up.    Diagnoses and all orders for this visit:    Endometrial cancer (CMS/HCC)        There is no evidence of disease upon today's exam. Plan will be for  every 3 month cancer surveillance visits for the first 2 years. These can be alternated between our office and Dr. Vincent. She is scheduled for radiation oncology follow-up in 7/2020. She is understanding to call with any changes in pelvic symptoms or general GYN concerns at any time between regularly scheduled visits.     The patient and I have reviewed her Survivorship Care Plan in detail. We discussed her diagnosis, pathology, histology, all treatments, and ongoing surveillance recommendations. All questions were answered to her satisfaction. She is in agreement with our plan for ongoing surveillance as outlined in her plan. A copy of this document was provided to her at the completion of our visit.  A copy has also been sent to her primary care provider.    This was a 25 minute direct face to face visit with 15 minutes spent in review of her Survivorship Care Plan.    Return to clinic in 3 months for ongoing cancer surveillance.  Then Dr. Vincent in Summer 2020.       LIANNA Baum

## 2020-01-08 ENCOUNTER — OFFICE VISIT (OUTPATIENT)
Dept: INTERNAL MEDICINE | Facility: CLINIC | Age: 55
End: 2020-01-08

## 2020-01-08 VITALS
HEART RATE: 74 BPM | HEIGHT: 67 IN | TEMPERATURE: 97.6 F | OXYGEN SATURATION: 99 % | WEIGHT: 247.2 LBS | SYSTOLIC BLOOD PRESSURE: 130 MMHG | BODY MASS INDEX: 38.8 KG/M2 | DIASTOLIC BLOOD PRESSURE: 88 MMHG

## 2020-01-08 DIAGNOSIS — I10 ESSENTIAL HYPERTENSION: ICD-10-CM

## 2020-01-08 DIAGNOSIS — Z00.00 ANNUAL PHYSICAL EXAM: Primary | ICD-10-CM

## 2020-01-08 DIAGNOSIS — E78.1 HIGH BLOOD TRIGLYCERIDES: ICD-10-CM

## 2020-01-08 DIAGNOSIS — E66.01 CLASS 2 SEVERE OBESITY WITH SERIOUS COMORBIDITY AND BODY MASS INDEX (BMI) OF 39.0 TO 39.9 IN ADULT, UNSPECIFIED OBESITY TYPE (HCC): ICD-10-CM

## 2020-01-08 LAB
BILIRUB BLD-MCNC: NEGATIVE MG/DL
CLARITY, POC: ABNORMAL
COLOR UR: YELLOW
GLUCOSE UR STRIP-MCNC: NEGATIVE MG/DL
KETONES UR QL: NEGATIVE
LEUKOCYTE EST, POC: NEGATIVE
NITRITE UR-MCNC: NEGATIVE MG/ML
PH UR: 6 [PH] (ref 5–8)
PROT UR STRIP-MCNC: NEGATIVE MG/DL
RBC # UR STRIP: NEGATIVE /UL
SP GR UR: 1.01 (ref 1–1.03)
UROBILINOGEN UR QL: NORMAL

## 2020-01-08 PROCEDURE — 99396 PREV VISIT EST AGE 40-64: CPT | Performed by: FAMILY MEDICINE

## 2020-01-08 PROCEDURE — 81003 URINALYSIS AUTO W/O SCOPE: CPT | Performed by: FAMILY MEDICINE

## 2020-01-08 RX ORDER — LOSARTAN POTASSIUM 100 MG/1
100 TABLET ORAL DAILY
Qty: 30 TABLET | Refills: 2 | Status: SHIPPED | OUTPATIENT
Start: 2020-01-08 | End: 2020-04-14 | Stop reason: SDUPTHER

## 2020-01-08 NOTE — PROGRESS NOTES
"Subjective   Tiarra Becerra is a 54 y.o. female.     Chief Complaint   Patient presents with   • Annual Exam       Visit Vitals  /88 (BP Location: Right arm, Patient Position: Sitting, Cuff Size: Large Adult)   Pulse 74   Temp 97.6 °F (36.4 °C)   Ht 168.9 cm (66.5\")   Wt 112 kg (247 lb 3.2 oz)   LMP 01/02/2018   SpO2 99%   BMI 39.30 kg/m²      Vitals:    01/08/20 0824 01/08/20 0900   BP: 142/90 130/88   BP Location: Right arm Right arm   Patient Position:  Sitting   Cuff Size: Large Adult Large Adult   Pulse: 74    Temp: 97.6 °F (36.4 °C)    SpO2: 99%    Weight: 112 kg (247 lb 3.2 oz)    Height: 168.9 cm (66.5\")    PainSc:   2    PainLoc: Hip  Comment: left      Wt Readings from Last 3 Encounters:   01/08/20 112 kg (247 lb 3.2 oz)   12/02/19 110 kg (243 lb)   11/13/19 110 kg (242 lb)          History of Present Illness   Pt here for annual exam.  Pt has chronic left hip pain.    Pt is walking and watching diet.   Pt has colonoscopy 11/2019, that did not have polyps.   Pt sees GYN for pelvic exam.   The following portions of the patient's history were reviewed and updated as appropriate: allergies, current medications, past family history, past medical history, past social history, past surgical history and problem list.    Past Medical History:   Diagnosis Date   • Endometrial cancer (CMS/HCC) 11/28/2018   • Essential hypertension 8/6/2018   • Hx of radiation therapy       Past Surgical History:   Procedure Laterality Date   • D&C HYSTEROSCOPY MYOSURE  11/05/2018   • OOPHORECTOMY     • TOTAL LAPAROSCOPIC HYSTERECTOMY WITH DAVINCI ROBOT  11/30/2018    laproscopic with BSO, lymph node dissection, left parametrectomy, lysis ureters, Dr Jaffe. Cancer   • TOTAL LAPAROSCOPIC HYSTERECTOMY, LAPAROSCOPIC NODE DISSECTION N/A 11/30/2018    Procedure: TOTAL LAPAROSCOPIC HYSTERECTOMY WITH DAVINCI ROBOT, BILATERAL SALPINGO OOPHORECTOMY, WITH RADICAL LEFT PARAMETRECTOMY, BILATERAL UTERETAL LYSIS, PELVIC LYMPH NODE " DISSECTION;  Surgeon: April Jaffe MD;  Location: [facility];  Service: DaVinci   • TUBAL ABDOMINAL LIGATION  2006      Family History   Problem Relation Age of Onset   • Breast cancer Cousin 55        50's   • Arthritis Mother    • Heart attack Mother    • Cancer Father         prostate   • Heart attack Father    • Hypertension Father    • Stroke Father    • Diabetes Sister         type 1   • Diabetes Brother         type 1   • Stroke Paternal Grandmother    • Ovarian cancer Neg Hx       Social History     Socioeconomic History   • Marital status:      Spouse name: Not on file   • Number of children: Not on file   • Years of education: Not on file   • Highest education level: Not on file   Tobacco Use   • Smoking status: Former Smoker     Packs/day: 0.75     Years: 12.00     Pack years: 9.00     Types: Cigarettes     Last attempt to quit:      Years since quittin.0   • Smokeless tobacco: Never Used   Substance and Sexual Activity   • Alcohol use: No   • Drug use: No   • Sexual activity: Yes     Partners: Male      Allergies   Allergen Reactions   • Lisinopril Cough       Review of Systems   Constitutional: Negative.  Negative for activity change, appetite change, chills, diaphoresis, fatigue, fever and unexpected weight change.   HENT: Positive for tinnitus (chronic). Negative for congestion, dental problem, drooling, ear discharge, ear pain, facial swelling, hearing loss, mouth sores, nosebleeds, postnasal drip, rhinorrhea, sinus pressure, sinus pain, sneezing, sore throat, trouble swallowing and voice change.    Eyes: Negative.  Negative for photophobia, pain, discharge, redness, itching and visual disturbance.   Respiratory: Negative.  Negative for apnea, cough, choking, chest tightness, shortness of breath, wheezing and stridor.    Cardiovascular: Negative.  Negative for chest pain, palpitations and leg swelling.   Gastrointestinal: Negative.  Negative for abdominal distention, abdominal  pain, anal bleeding, blood in stool, constipation, diarrhea, nausea, rectal pain and vomiting.   Endocrine: Negative.  Negative for cold intolerance, heat intolerance, polydipsia, polyphagia and polyuria.   Genitourinary: Positive for frequency. Negative for decreased urine volume, difficulty urinating, dyspareunia, dysuria, enuresis, flank pain, genital sores, hematuria, menstrual problem, pelvic pain, urgency, vaginal bleeding, vaginal discharge and vaginal pain.        Nocturia x 2   Musculoskeletal: Positive for arthralgias (left hip) and gait problem. Negative for back pain, joint swelling, myalgias, neck pain and neck stiffness.   Skin: Negative.  Negative for color change, pallor, rash and wound.   Allergic/Immunologic: Negative.  Negative for environmental allergies, food allergies and immunocompromised state.   Neurological: Negative for dizziness, tremors, seizures, syncope, facial asymmetry, speech difficulty, weakness, light-headedness, numbness and headaches.   Hematological: Negative.  Negative for adenopathy. Does not bruise/bleed easily.   Psychiatric/Behavioral: Negative.  Negative for agitation, behavioral problems, confusion, decreased concentration, dysphoric mood, hallucinations, self-injury, sleep disturbance and suicidal ideas. The patient is not nervous/anxious and is not hyperactive.        Objective   Physical Exam   Constitutional: She is oriented to person, place, and time. She appears well-developed and well-nourished. No distress.   HENT:   Head: Normocephalic and atraumatic.   Right Ear: Tympanic membrane, external ear and ear canal normal.   Left Ear: External ear normal.   Ears:    Nose: Nose normal.   Mouth/Throat: Uvula is midline, oropharynx is clear and moist and mucous membranes are normal. Mucous membranes are not pale, not dry and not cyanotic. Normal dentition. No oropharyngeal exudate, posterior oropharyngeal edema or posterior oropharyngeal erythema.   Eyes: Pupils are  equal, round, and reactive to light. Conjunctivae, EOM and lids are normal. Right eye exhibits no chemosis, no discharge, no exudate and no hordeolum. No foreign body present in the right eye. Left eye exhibits no chemosis, no discharge, no exudate and no hordeolum. No foreign body present in the left eye. Right conjunctiva is not injected. Right conjunctiva has no hemorrhage. Left conjunctiva is not injected. Left conjunctiva has no hemorrhage. No scleral icterus. Right eye exhibits normal extraocular motion and no nystagmus. Left eye exhibits normal extraocular motion and no nystagmus.   Fundoscopic exam:       The right eye shows red reflex.        The left eye shows red reflex.   Neck: Trachea normal and normal range of motion. Neck supple. Carotid bruit is not present. No tracheal deviation present. No thyroid mass and no thyromegaly present.   Cardiovascular: Normal rate, regular rhythm, normal heart sounds and intact distal pulses. Exam reveals no gallop and no friction rub.   No murmur heard.  Pulses:       Dorsalis pedis pulses are 2+ on the right side, and 2+ on the left side.        Posterior tibial pulses are 2+ on the right side, and 2+ on the left side.   Pulmonary/Chest: Effort normal and breath sounds normal. No respiratory distress. She has no decreased breath sounds. She has no wheezes. She has no rhonchi. She has no rales. Chest wall is not dull to percussion. She exhibits no tenderness. Right breast exhibits no inverted nipple, no mass, no nipple discharge, no skin change and no tenderness. Left breast exhibits no inverted nipple, no mass, no nipple discharge, no skin change and no tenderness.   Abdominal: Soft. Bowel sounds are normal. She exhibits no distension and no mass. There is no hepatosplenomegaly. There is no tenderness. There is no rebound and no guarding.   Musculoskeletal: Normal range of motion. She exhibits no edema, tenderness or deformity.        Left hip: She exhibits normal  range of motion and no tenderness.        Lumbar back: She exhibits normal range of motion, no tenderness, no bony tenderness and no spasm.   Lymphadenopathy:        Head (right side): No submandibular adenopathy present.        Head (left side): No submandibular adenopathy present.     She has no cervical adenopathy.        Right cervical: No superficial cervical, no deep cervical and no posterior cervical adenopathy present.       Left cervical: No superficial cervical, no deep cervical and no posterior cervical adenopathy present.     She has no axillary adenopathy.        Right axillary: No pectoral and no lateral adenopathy present.        Left axillary: No pectoral and no lateral adenopathy present.       Right: No inguinal and no supraclavicular adenopathy present.        Left: No inguinal and no supraclavicular adenopathy present.   Neurological: She is alert and oriented to person, place, and time. She has normal strength and normal reflexes. No cranial nerve deficit or sensory deficit. Coordination normal.   Reflex Scores:       Bicep reflexes are 2+ on the right side and 2+ on the left side.       Brachioradialis reflexes are 2+ on the right side and 2+ on the left side.       Patellar reflexes are 2+ on the right side and 2+ on the left side.  Skin: Skin is warm and dry. No rash noted. She is not diaphoretic. No cyanosis. Nails show no clubbing.   Psychiatric: She has a normal mood and affect. Her speech is normal and behavior is normal. Judgment and thought content normal. Cognition and memory are normal.   Nursing note and vitals reviewed.      Assessment/Plan   Tiarra was seen today for annual exam.    Diagnoses and all orders for this visit:    Annual physical exam  -     POCT urinalysis dipstick, automated    Essential hypertension  -     losartan (COZAAR) 100 MG tablet; Take 1 tablet by mouth Daily.    High blood triglycerides    Class 2 severe obesity with serious comorbidity and body mass index  (BMI) of 39.0 to 39.9 in adult, unspecified obesity type (CMS/HCC)        Please follow a low animal fat diet that is also low in sugar, low in junk food, low in sweet drinks and low in alcohol.  Please increase the amount of fiber in your diet as well as increasing your daily exercise, such as walking.    Pt wants to hold off on testing. Check lipids 3 months         Current Outpatient Medications:   •  cetirizine (ZyrTEC) 10 MG tablet, Take 10 mg by mouth Every Other Day., Disp: , Rfl:   •  Cholecalciferol (VITAMIN D-3 PO), Take 2,000 Units by mouth Daily., Disp: , Rfl:   •  losartan (COZAAR) 100 MG tablet, Take 1 tablet by mouth Daily., Disp: 30 tablet, Rfl: 2  •  Omega-3 Fatty Acids (FISH OIL) 1000 MG capsule capsule, Take  by mouth Daily With Breakfast., Disp: , Rfl:     Return in about 3 months (around 4/8/2020), or if symptoms worsen or fail to improve, for Recheck lipid and HTN.    Health Maintenance   Topic Date Due   • LIPID PANEL  07/08/2020   • ANNUAL PHYSICAL  01/09/2021   • MAMMOGRAM  07/08/2021   • TDAP/TD VACCINES (2 - Td) 09/07/2021   • PAP SMEAR  11/07/2021   • COLONOSCOPY  11/25/2029   • HEPATITIS C SCREENING  Completed   • INFLUENZA VACCINE  Completed   • ZOSTER VACCINE  Completed

## 2020-03-03 ENCOUNTER — APPOINTMENT (OUTPATIENT)
Dept: BONE DENSITY | Facility: HOSPITAL | Age: 55
End: 2020-03-03

## 2020-03-05 ENCOUNTER — OFFICE VISIT (OUTPATIENT)
Dept: GYNECOLOGIC ONCOLOGY | Facility: CLINIC | Age: 55
End: 2020-03-05

## 2020-03-05 VITALS
OXYGEN SATURATION: 98 % | BODY MASS INDEX: 41.11 KG/M2 | DIASTOLIC BLOOD PRESSURE: 82 MMHG | RESPIRATION RATE: 16 BRPM | TEMPERATURE: 98.1 F | HEART RATE: 85 BPM | WEIGHT: 255.8 LBS | HEIGHT: 66 IN | SYSTOLIC BLOOD PRESSURE: 156 MMHG

## 2020-03-05 DIAGNOSIS — Z71.89 COUNSELING REGARDING ADVANCED CARE PLANNING AND GOALS OF CARE: ICD-10-CM

## 2020-03-05 DIAGNOSIS — C54.1 ENDOMETRIAL CANCER (HCC): Primary | ICD-10-CM

## 2020-03-05 PROCEDURE — 99214 OFFICE O/P EST MOD 30 MIN: CPT | Performed by: NURSE PRACTITIONER

## 2020-03-05 NOTE — PROGRESS NOTES
GYN ONCOLOGY CANCER SURVEILLANCE FOLLOW-UP    Tiarra Becerra  [id number]  1965    Chief Complaint: Follow-up (no gyn complaints)        History of present illness:  Tiarra Becerra is a 55 y.o. year old female who is here today for ongoing surveillance of Endometrial Cancer, see Cancer History. She is now 1 year out from completion of treatment. Upon arrival she reports left sided hip pain that is worsened with activity. She states she thinks this could be musculoskeletal in nature or possibly arthritic pain. She has not seen her PCP for this issue yet because she is paying off some other medical bills, but plans to in the near future. Otherwise, she is feeling generally well today. She denies vaginal bleeding, pelvic pain, concerning lesions, or changes in bowel or bladder function.     Cancer History:      Endometrial cancer (CMS/[facility])    10/2018 Imaging     Patient presented to Dr. Davis for Annual exam with c/o new onset vaginal bleeding after amenorrhea x 8 months.   TVUS revealed large polyp vs fibroid in endocervical canal      11/5/2018 Procedure     ECC and Myosure by gynecologist. Pathology revealed grade 1 endometrioid adenocarcinoma in a background of complex atypical hyperplasia, suspicion for myoinvasion. Pt referred to Gyn Oncology      11/30/2018 Surgery     RTLH/BSO, radical left parametrectomy, left ureteral lysis, and pelvic lymph node dissection.   There was suspicion for concurrent cervical cancer at time of surgery, found to be multifocal grade 2-3 endometrioid carcinoma with extensive lower uterine segment involvement upon final pathology. Tumor invasive into over 50% of the myometrium (11/14 mm). Nodes negative. MSI testing normal. Stage IB grade 2-3      12/26/2018 Imaging     Post-op CT abdomen and pelvis negative      1/15/2019 - 2/18/2019 Radiation     Radiation OncologyTreatment Course:  Tiarra Becerra received 4500 cGy in 25 fractions to pelvis via External Beam Radiation - EBRT.       2/26/2019 - 2/28/2019 Radiation     Radiation OncologyTreatment Course:  Tiarra Becerra received 1200 cGy in 2 fractions to vagina via High Dose Radiation - HDR.      6/12/2019 Imaging     CT chest, abdomen, pelvis negative      12/2/2019 Survivorship     Survivorship Care Plan completed and discussed with patient.  Copy of Survivorship Care Plan provided to patient and primary care provider.         Past Medical History:   Diagnosis Date   • Endometrial cancer (CMS/HCC) 11/28/2018   • Essential hypertension 8/6/2018   • Hx of radiation therapy        Past Surgical History:   Procedure Laterality Date   • D&C HYSTEROSCOPY MYOSURE  11/05/2018   • OOPHORECTOMY     • TOTAL LAPAROSCOPIC HYSTERECTOMY WITH DAVINCI ROBOT  11/30/2018    laproscopic with BSO, lymph node dissection, left parametrectomy, lysis ureters, Dr Jaffe. Cancer   • TOTAL LAPAROSCOPIC HYSTERECTOMY, LAPAROSCOPIC NODE DISSECTION N/A 11/30/2018    Procedure: TOTAL LAPAROSCOPIC HYSTERECTOMY WITH DAVINCI ROBOT, BILATERAL SALPINGO OOPHORECTOMY, WITH RADICAL LEFT PARAMETRECTOMY, BILATERAL UTERETAL LYSIS, PELVIC LYMPH NODE DISSECTION;  Surgeon: April Jaffe MD;  [facility]: [facility];  Service: DaVinci   • TUBAL ABDOMINAL LIGATION  2006       MEDICATIONS: The current medication list was reviewed and reconciled.     Allergies:  is allergic to lisinopril.    Family History   Problem Relation Age of Onset   • Breast cancer Cousin 55        50's   • Arthritis Mother    • Heart attack Mother    • Cancer Father         prostate   • Heart attack Father    • Hypertension Father    • Stroke Father    • Diabetes Sister         type 1   • Diabetes Brother         type 1   • Stroke Paternal Grandmother    • Ovarian cancer Neg Hx        Last imaging study was 06/12/2019 CT abp and chest with contrast.     Review of Systems   Constitutional: Negative for appetite change, chills, fatigue, fever and unexpected weight change.   Respiratory: Negative for cough, shortness of  "breath and wheezing.    Cardiovascular: Negative for chest pain, palpitations and leg swelling.   Gastrointestinal: Negative for abdominal distention, abdominal pain, blood in stool, constipation, diarrhea, nausea and vomiting.   Endocrine: Negative.    Genitourinary: Negative for dyspareunia, dysuria, frequency, genital sores, hematuria, pelvic pain, urgency, vaginal bleeding, vaginal discharge and vaginal pain.   Musculoskeletal: Positive for arthralgias (left hip pain). Negative for gait problem and joint swelling.   Neurological: Negative for dizziness, seizures, syncope, weakness, light-headedness, numbness and headaches.   Hematological: Negative for adenopathy.   Psychiatric/Behavioral: Negative.        PHQ-9 Total Score: 5   PHQ-9 Depression Screening  Little interest or pleasure in doing things? 1   Feeling down, depressed, or hopeless? 0   Trouble falling or staying asleep, or sleeping too much? 1   Feeling tired or having little energy? 2   Poor appetite or overeating?     Feeling bad about yourself - or that you are a failure or have let yourself or your family down? 1   Trouble concentrating on things, such as reading the newspaper or watching television? 0   Moving or speaking so slowly that other people could have noticed? Or the opposite - being so fidgety or restless that you have been moving around a lot more than usual? 0   Thoughts that you would be better off dead, or of hurting yourself in some way? 0   PHQ-9 Total Score 5   If you checked off any problems, how difficult have these problems made it for you to do your work, take care of things at home, or get along with other people? Not difficult at all         Physical Exam  Vital Signs: /82   Pulse 85   Temp 98.1 °F (36.7 °C) (Temporal)   Resp 16   Ht 168.9 cm (66.5\")   Wt 116 kg (255 lb 12.8 oz)   LMP 01/02/2018   SpO2 98%   BMI 40.67 kg/m²   Vitals:    03/05/20 1036   PainSc:   4   PainLoc: Hip           General Appearance:  " alert, cooperative, no apparent distress, appears stated age and obese   Neurologic/Psychiatric: A&O x 3, gait steady, appropriate affect   HEENT:  Normocephalic, without obvious abnormality, mucous membranes moist   Neck: Supple, symmetrical, trachea midline, no adenopathy;  No thyromegaly, masses, or tenderness   Back:   Symmetric, no curvature, ROM normal, no CVA tenderness   Lungs:   Clear to auscultation bilaterally; respirations regular, even, and unlabored bilaterally   Heart:  Regular rate and rhythm, no murmurs appreciated   Breasts:  deferred   Abdomen:   Soft, non-tender, non-distended, no organomegaly and Exam limited d/t habitus.   Lymph nodes: No cervical, supraclavicular, inguinal adenopathy noted   Extremities: Normal, atraumatic; no clubbing, cyanosis, or edema    Pelvic: External Genitalia  without lesions or skin changes  Vagina  is pink, moist, without lesions.   Vaginal Cuff  Female Vaginal Cuff: smooth, intact and without visible lesions  Uterus  surgically absent, no palpable masses and exam limited d/t habitus  Ovaries  without palpable masses or fullness  Parametria  smooth  Rectovaginal  Female rectovaginal: deferred     ECOG Performance Status: (0) Fully Active - Able to Carry On All Pre-disease Performance Without Restriction    Procedure Note:  No notes on file      Assessment and Plan:  Tiarra was seen today for follow-up.    Diagnoses and all orders for this visit:    Endometrial cancer (CMS/HCC)    Counseling regarding advanced care planning and goals of care  -     Ambulatory Referral to Advance Care Planning          There is no evidence of disease upon today's exam. Continue every 3 month cancer surveillance visits until the 2 year bernadine. Patient is alternating between our office and radiation oncology, scheduled to see Dr. Carlton jasso in 7/2020. She is understanding to call with any changes in pelvic symptoms or general GYN concerns at any time between regularly scheduled visits.      Pain assessment was performed today as a part of patient’s care.  Tiarra Becerra reports a pain score of 4 r/t left hip pain. For patients with pain related to surgery, gynecologic malignancy or cancer treatment, the plan is as noted in the assessment/plan.  For patients with pain not related to these issues, they are to seek any further needed care from a more appropriate provider, such as PCP.    Advance Care Planning   ACP discussion was held with the patient during this visit. Patient does not have an advance directive, information provided. Patient desires ACP referral, order placed.        Return to clinic in 3 months for ongoing cancer surveillance.      Nanette Gandhi, APRN

## 2020-04-14 DIAGNOSIS — I10 ESSENTIAL HYPERTENSION: ICD-10-CM

## 2020-04-14 RX ORDER — LOSARTAN POTASSIUM 100 MG/1
100 TABLET ORAL DAILY
Qty: 30 TABLET | Refills: 0 | Status: SHIPPED | OUTPATIENT
Start: 2020-04-14 | End: 2020-05-14 | Stop reason: SDUPTHER

## 2020-05-14 DIAGNOSIS — I10 ESSENTIAL HYPERTENSION: ICD-10-CM

## 2020-05-15 RX ORDER — LOSARTAN POTASSIUM 100 MG/1
100 TABLET ORAL DAILY
Qty: 30 TABLET | Refills: 0 | Status: SHIPPED | OUTPATIENT
Start: 2020-05-15 | End: 2020-05-26 | Stop reason: SDUPTHER

## 2020-05-26 ENCOUNTER — LAB (OUTPATIENT)
Dept: LAB | Facility: HOSPITAL | Age: 55
End: 2020-05-26

## 2020-05-26 ENCOUNTER — OFFICE VISIT (OUTPATIENT)
Dept: INTERNAL MEDICINE | Facility: CLINIC | Age: 55
End: 2020-05-26

## 2020-05-26 VITALS
SYSTOLIC BLOOD PRESSURE: 140 MMHG | HEART RATE: 78 BPM | HEIGHT: 67 IN | BODY MASS INDEX: 40.21 KG/M2 | OXYGEN SATURATION: 98 % | TEMPERATURE: 98 F | WEIGHT: 256.2 LBS | DIASTOLIC BLOOD PRESSURE: 100 MMHG

## 2020-05-26 DIAGNOSIS — I10 ESSENTIAL HYPERTENSION: ICD-10-CM

## 2020-05-26 DIAGNOSIS — Z12.31 ENCOUNTER FOR SCREENING MAMMOGRAM FOR MALIGNANT NEOPLASM OF BREAST: ICD-10-CM

## 2020-05-26 DIAGNOSIS — I10 ESSENTIAL HYPERTENSION: Primary | ICD-10-CM

## 2020-05-26 DIAGNOSIS — E78.1 HIGH BLOOD TRIGLYCERIDES: ICD-10-CM

## 2020-05-26 LAB
ALBUMIN SERPL-MCNC: 4.7 G/DL (ref 3.5–5.2)
ALBUMIN/GLOB SERPL: 2 G/DL
ALP SERPL-CCNC: 106 U/L (ref 39–117)
ALT SERPL W P-5'-P-CCNC: 74 U/L (ref 1–33)
ANION GAP SERPL CALCULATED.3IONS-SCNC: 11.4 MMOL/L (ref 5–15)
AST SERPL-CCNC: 45 U/L (ref 1–32)
BASOPHILS # BLD AUTO: 0.04 10*3/MM3 (ref 0–0.2)
BASOPHILS NFR BLD AUTO: 0.9 % (ref 0–1.5)
BILIRUB SERPL-MCNC: 0.4 MG/DL (ref 0.2–1.2)
BUN BLD-MCNC: 9 MG/DL (ref 6–20)
BUN/CREAT SERPL: 12.9 (ref 7–25)
CALCIUM SPEC-SCNC: 9.7 MG/DL (ref 8.6–10.5)
CHLORIDE SERPL-SCNC: 99 MMOL/L (ref 98–107)
CHOLEST SERPL-MCNC: 213 MG/DL (ref 0–200)
CO2 SERPL-SCNC: 27.6 MMOL/L (ref 22–29)
CREAT BLD-MCNC: 0.7 MG/DL (ref 0.57–1)
DEPRECATED RDW RBC AUTO: 41.3 FL (ref 37–54)
EOSINOPHIL # BLD AUTO: 0.11 10*3/MM3 (ref 0–0.4)
EOSINOPHIL NFR BLD AUTO: 2.6 % (ref 0.3–6.2)
ERYTHROCYTE [DISTWIDTH] IN BLOOD BY AUTOMATED COUNT: 13.7 % (ref 12.3–15.4)
GFR SERPL CREATININE-BSD FRML MDRD: 87 ML/MIN/1.73
GLOBULIN UR ELPH-MCNC: 2.3 GM/DL
GLUCOSE BLD-MCNC: 107 MG/DL (ref 65–99)
HCT VFR BLD AUTO: 40.2 % (ref 34–46.6)
HDLC SERPL-MCNC: 59 MG/DL (ref 40–60)
HGB BLD-MCNC: 13.3 G/DL (ref 12–15.9)
IMM GRANULOCYTES # BLD AUTO: 0.01 10*3/MM3 (ref 0–0.05)
IMM GRANULOCYTES NFR BLD AUTO: 0.2 % (ref 0–0.5)
LDLC SERPL CALC-MCNC: 112 MG/DL (ref 0–100)
LDLC/HDLC SERPL: 1.89 {RATIO}
LYMPHOCYTES # BLD AUTO: 0.5 10*3/MM3 (ref 0.7–3.1)
LYMPHOCYTES NFR BLD AUTO: 11.8 % (ref 19.6–45.3)
MCH RBC QN AUTO: 27.5 PG (ref 26.6–33)
MCHC RBC AUTO-ENTMCNC: 33.1 G/DL (ref 31.5–35.7)
MCV RBC AUTO: 83.1 FL (ref 79–97)
MONOCYTES # BLD AUTO: 0.29 10*3/MM3 (ref 0.1–0.9)
MONOCYTES NFR BLD AUTO: 6.9 % (ref 5–12)
NEUTROPHILS # BLD AUTO: 3.28 10*3/MM3 (ref 1.7–7)
NEUTROPHILS NFR BLD AUTO: 77.6 % (ref 42.7–76)
NRBC BLD AUTO-RTO: 0 /100 WBC (ref 0–0.2)
PLATELET # BLD AUTO: 182 10*3/MM3 (ref 140–450)
PMV BLD AUTO: 10.2 FL (ref 6–12)
POTASSIUM BLD-SCNC: 4.4 MMOL/L (ref 3.5–5.2)
PROT SERPL-MCNC: 7 G/DL (ref 6–8.5)
RBC # BLD AUTO: 4.84 10*6/MM3 (ref 3.77–5.28)
SODIUM BLD-SCNC: 138 MMOL/L (ref 136–145)
TRIGL SERPL-MCNC: 212 MG/DL (ref 0–150)
TSH SERPL DL<=0.05 MIU/L-ACNC: 2.99 UIU/ML (ref 0.27–4.2)
VLDLC SERPL-MCNC: 42.4 MG/DL (ref 5–40)
WBC NRBC COR # BLD: 4.23 10*3/MM3 (ref 3.4–10.8)

## 2020-05-26 PROCEDURE — 80053 COMPREHEN METABOLIC PANEL: CPT

## 2020-05-26 PROCEDURE — 99214 OFFICE O/P EST MOD 30 MIN: CPT | Performed by: FAMILY MEDICINE

## 2020-05-26 PROCEDURE — 84443 ASSAY THYROID STIM HORMONE: CPT

## 2020-05-26 PROCEDURE — 80061 LIPID PANEL: CPT

## 2020-05-26 PROCEDURE — 85025 COMPLETE CBC W/AUTO DIFF WBC: CPT

## 2020-05-26 RX ORDER — LOSARTAN POTASSIUM 100 MG/1
100 TABLET ORAL DAILY
Qty: 30 TABLET | Refills: 3 | Status: SHIPPED | OUTPATIENT
Start: 2020-05-26 | End: 2020-08-27 | Stop reason: SDUPTHER

## 2020-05-26 RX ORDER — METOPROLOL SUCCINATE 25 MG/1
25 TABLET, EXTENDED RELEASE ORAL DAILY
Qty: 30 TABLET | Refills: 3 | Status: SHIPPED | OUTPATIENT
Start: 2020-05-26 | End: 2020-08-27 | Stop reason: SDUPTHER

## 2020-05-26 NOTE — PROGRESS NOTES
"Subjective   Tiarra Becerra is a 55 y.o. female.     Chief Complaint   Patient presents with   • Hypertension     f/u       Visit Vitals  /100 (BP Location: Right arm, Cuff Size: Large Adult)   Pulse 78   Temp 98 °F (36.7 °C)   Ht 168.9 cm (66.5\")   Wt 116 kg (256 lb 3.2 oz)   LMP 01/02/2018   SpO2 98%   BMI 40.73 kg/m²       Wt Readings from Last 3 Encounters:   05/26/20 116 kg (256 lb 3.2 oz)   03/05/20 116 kg (255 lb 12.8 oz)   01/08/20 112 kg (247 lb 3.2 oz)       Hypertension   This is a chronic problem. The current episode started more than 1 year ago. Pertinent negatives include no anxiety, blurred vision, chest pain, headaches, malaise/fatigue, neck pain, orthopnea, palpitations, peripheral edema, PND, shortness of breath or sweats. There are no associated agents to hypertension. Risk factors for coronary artery disease include obesity, post-menopausal state and family history. Current antihypertension treatment includes angiotensin blockers. The current treatment provides moderate improvement. There are no compliance problems.  There is no history of sleep apnea or a thyroid problem.      Pt has been working from home. BP at home 138/89.   The following portions of the patient's history were reviewed and updated as appropriate: allergies, current medications, past family history, past medical history, past social history, past surgical history and problem list.    Past Medical History:   Diagnosis Date   • Endometrial cancer (CMS/HCC) 11/28/2018   • Essential hypertension 8/6/2018   • Hx of radiation therapy       Past Surgical History:   Procedure Laterality Date   • D&C HYSTEROSCOPY MYOSURE  11/05/2018   • OOPHORECTOMY     • TOTAL LAPAROSCOPIC HYSTERECTOMY WITH DAVINCI ROBOT  11/30/2018    laproscopic with BSO, lymph node dissection, left parametrectomy, lysis ureters, Dr Jaffe. Cancer   • TOTAL LAPAROSCOPIC HYSTERECTOMY, LAPAROSCOPIC NODE DISSECTION N/A 11/30/2018    Procedure: TOTAL LAPAROSCOPIC " HYSTERECTOMY WITH DAVINCI ROBOT, BILATERAL SALPINGO OOPHORECTOMY, WITH RADICAL LEFT PARAMETRECTOMY, BILATERAL UTERETAL LYSIS, PELVIC LYMPH NODE DISSECTION;  Surgeon: April Jaffe MD;  [facility];  Service: DaVinci   • TUBAL ABDOMINAL LIGATION        Family History   Problem Relation Age of Onset   • Breast cancer Cousin 55        50's   • Arthritis Mother    • Heart attack Mother    • Cancer Father         prostate   • Heart attack Father    • Hypertension Father    • Stroke Father    • Diabetes Sister         type 1   • Diabetes Brother         type 1   • Stroke Paternal Grandmother    • Ovarian cancer Neg Hx       Social History     Socioeconomic History   • Marital status:      Spouse name: Not on file   • Number of children: Not on file   • Years of education: Not on file   • Highest education level: Not on file   Tobacco Use   • Smoking status: Former Smoker     Packs/day: 0.75     Years: 12.00     Pack years: 9.00     Types: Cigarettes     Last attempt to quit:      Years since quittin.4   • Smokeless tobacco: Never Used   Substance and Sexual Activity   • Alcohol use: No   • Drug use: No   • Sexual activity: Yes     Partners: Male      Allergies   Allergen Reactions   • Lisinopril Cough       Review of Systems   Constitutional: Negative.  Negative for chills, diaphoresis, fatigue, fever and malaise/fatigue.   HENT: Negative.  Negative for ear pain, nosebleeds, postnasal drip, rhinorrhea, sinus pressure, sneezing and sore throat.    Eyes: Negative.  Negative for blurred vision, redness and itching.   Respiratory: Negative.  Negative for cough, shortness of breath and wheezing.    Cardiovascular: Negative.  Negative for chest pain, palpitations, orthopnea, leg swelling and PND.   Gastrointestinal: Negative.  Negative for abdominal pain, constipation, diarrhea, nausea and vomiting.   Endocrine: Negative.  Negative for cold intolerance and heat intolerance.   Genitourinary:  Positive for frequency (denies infection). Negative for dysuria, hematuria, urgency and vaginal bleeding.   Musculoskeletal: Positive for arthralgias (left hip). Negative for back pain and neck pain.   Skin: Negative.  Negative for color change and rash.   Allergic/Immunologic: Positive for environmental allergies.   Neurological: Negative.  Negative for dizziness, syncope, light-headedness and headaches.   Hematological: Negative.  Negative for adenopathy. Does not bruise/bleed easily.   Psychiatric/Behavioral: Negative.  Negative for dysphoric mood and sleep disturbance. The patient is not nervous/anxious.        Objective   Physical Exam   Constitutional: She is oriented to person, place, and time. She appears well-developed.   HENT:   Head: Normocephalic.   Right Ear: External ear normal.   Left Ear: External ear normal.   Nose: Nose normal.   Eyes: Pupils are equal, round, and reactive to light. Conjunctivae, EOM and lids are normal.   Neck: Trachea normal and normal range of motion. Neck supple. Carotid bruit is not present. No thyroid mass and no thyromegaly present.   Cardiovascular: Normal rate and regular rhythm.   No murmur heard.  Pulmonary/Chest: Effort normal and breath sounds normal. No respiratory distress. She has no decreased breath sounds. She has no wheezes. She has no rhonchi. She has no rales. She exhibits no tenderness.   Abdominal: Soft. Bowel sounds are normal. There is no tenderness.   Musculoskeletal: Normal range of motion.   Neurological: She is alert and oriented to person, place, and time.   Skin: Skin is warm and dry.   Psychiatric: She has a normal mood and affect. Her behavior is normal.   Nursing note and vitals reviewed.      Assessment/Plan   Tiarra was seen today for hypertension.    Diagnoses and all orders for this visit:    Essential hypertension  -     losartan (COZAAR) 100 MG tablet; Take 1 tablet by mouth Daily.  -     metoprolol succinate XL (TOPROL-XL) 25 MG 24 hr tablet;  Take 1 tablet by mouth Daily.  -     Comprehensive Metabolic Panel; Future  -     Lipid Panel; Future  -     TSH; Future  -     CBC & Differential; Future    High blood triglycerides    Encounter for screening mammogram for malignant neoplasm of breast  -     Mammo Screening Digital Tomosynthesis Bilateral With CAD; Future      Add metoprolol susc XL 25 mg qd    Please follow a low animal fat diet that is also low in sugar, low in junk food, low in sweet drinks and low in alcohol.  Please increase the amount of fiber in your diet as well as increasing your daily exercise, such as walking.           Current Outpatient Medications:   •  cetirizine (ZyrTEC) 10 MG tablet, Take 10 mg by mouth Every Other Day., Disp: , Rfl:   •  Cholecalciferol (VITAMIN D-3 PO), Take 2,000 Units by mouth Daily., Disp: , Rfl:   •  losartan (COZAAR) 100 MG tablet, Take 1 tablet by mouth Daily., Disp: 30 tablet, Rfl: 3  •  Omega-3 Fatty Acids (FISH OIL) 1000 MG capsule capsule, Take  by mouth Daily With Breakfast., Disp: , Rfl:   •  metoprolol succinate XL (TOPROL-XL) 25 MG 24 hr tablet, Take 1 tablet by mouth Daily., Disp: 30 tablet, Rfl: 3    Return in about 2 weeks (around 6/9/2020), or if symptoms worsen or fail to improve, for Recheck BP with nurse.

## 2020-05-27 ENCOUNTER — TELEPHONE (OUTPATIENT)
Dept: INTERNAL MEDICINE | Facility: CLINIC | Age: 55
End: 2020-05-27

## 2020-05-27 DIAGNOSIS — Z85.42 HISTORY OF ENDOMETRIAL CANCER: Primary | ICD-10-CM

## 2020-05-27 DIAGNOSIS — R74.01 ELEVATED TRANSAMINASE LEVEL: ICD-10-CM

## 2020-05-29 ENCOUNTER — TELEPHONE (OUTPATIENT)
Dept: INTERNAL MEDICINE | Facility: CLINIC | Age: 55
End: 2020-05-29

## 2020-06-10 ENCOUNTER — APPOINTMENT (OUTPATIENT)
Dept: CT IMAGING | Facility: HOSPITAL | Age: 55
End: 2020-06-10

## 2020-06-16 ENCOUNTER — CLINICAL SUPPORT (OUTPATIENT)
Dept: INTERNAL MEDICINE | Facility: CLINIC | Age: 55
End: 2020-06-16

## 2020-06-16 ENCOUNTER — TELEPHONE (OUTPATIENT)
Dept: INTERNAL MEDICINE | Facility: CLINIC | Age: 55
End: 2020-06-16

## 2020-06-16 VITALS — SYSTOLIC BLOOD PRESSURE: 138 MMHG | DIASTOLIC BLOOD PRESSURE: 84 MMHG

## 2020-06-16 NOTE — TELEPHONE ENCOUNTER
Pt came in for bp check, 138/84. States this is what it has been running at home as well. Wanted to know about her CT scan as well

## 2020-06-16 NOTE — TELEPHONE ENCOUNTER
See letter, fatty liver and persistent 1.6 cm mass spleen that was previously described as hemangioma(blood vessel cluster) on  CT scans.  It pt avoiding salt and walking daily? This can help BP.  Goal less than 130 systolic and less than 85 diastolic.  Recheck nurse visit 2 week BP

## 2020-07-01 ENCOUNTER — TELEPHONE (OUTPATIENT)
Dept: INTERNAL MEDICINE | Facility: CLINIC | Age: 55
End: 2020-07-01

## 2020-07-01 ENCOUNTER — CLINICAL SUPPORT (OUTPATIENT)
Dept: INTERNAL MEDICINE | Facility: CLINIC | Age: 55
End: 2020-07-01

## 2020-07-01 VITALS — SYSTOLIC BLOOD PRESSURE: 126 MMHG | DIASTOLIC BLOOD PRESSURE: 84 MMHG

## 2020-07-21 ENCOUNTER — HOSPITAL ENCOUNTER (OUTPATIENT)
Dept: MAMMOGRAPHY | Facility: HOSPITAL | Age: 55
End: 2020-07-21

## 2020-07-31 ENCOUNTER — APPOINTMENT (OUTPATIENT)
Dept: MRI IMAGING | Facility: HOSPITAL | Age: 55
End: 2020-07-31

## 2020-07-31 ENCOUNTER — APPOINTMENT (OUTPATIENT)
Dept: GENERAL RADIOLOGY | Facility: HOSPITAL | Age: 55
End: 2020-07-31

## 2020-07-31 ENCOUNTER — HOSPITAL ENCOUNTER (EMERGENCY)
Facility: HOSPITAL | Age: 55
Discharge: HOME | End: 2020-07-31
Attending: EMERGENCY MEDICINE | Admitting: EMERGENCY MEDICINE

## 2020-07-31 ENCOUNTER — TELEPHONE (OUTPATIENT)
Dept: INTERNAL MEDICINE | Facility: CLINIC | Age: 55
End: 2020-07-31

## 2020-07-31 VITALS
OXYGEN SATURATION: 97 % | TEMPERATURE: 97.7 F | WEIGHT: 255 LBS | BODY MASS INDEX: 40.98 KG/M2 | DIASTOLIC BLOOD PRESSURE: 106 MMHG | HEIGHT: 66 IN | SYSTOLIC BLOOD PRESSURE: 167 MMHG | HEART RATE: 75 BPM | RESPIRATION RATE: 16 BRPM

## 2020-07-31 DIAGNOSIS — H81.10 BENIGN PAROXYSMAL POSITIONAL VERTIGO, UNSPECIFIED LATERALITY: Primary | ICD-10-CM

## 2020-07-31 LAB
ALBUMIN SERPL-MCNC: 4.4 G/DL (ref 3.5–5.2)
ALBUMIN/GLOB SERPL: 1.8 G/DL
ALP SERPL-CCNC: 126 U/L (ref 39–117)
ALT SERPL W P-5'-P-CCNC: 70 U/L (ref 1–33)
ANION GAP SERPL CALCULATED.3IONS-SCNC: 13 MMOL/L (ref 5–15)
AST SERPL-CCNC: 39 U/L (ref 1–32)
BACTERIA UR QL AUTO: ABNORMAL /HPF
BASOPHILS # BLD AUTO: 0.03 10*3/MM3 (ref 0–0.2)
BASOPHILS NFR BLD AUTO: 0.4 % (ref 0–1.5)
BILIRUB SERPL-MCNC: 0.5 MG/DL (ref 0–1.2)
BILIRUB UR QL STRIP: NEGATIVE
BUN SERPL-MCNC: 10 MG/DL (ref 6–20)
BUN/CREAT SERPL: 12 (ref 7–25)
CALCIUM SPEC-SCNC: 9.5 MG/DL (ref 8.6–10.5)
CHLORIDE SERPL-SCNC: 101 MMOL/L (ref 98–107)
CLARITY UR: CLEAR
CO2 SERPL-SCNC: 27 MMOL/L (ref 22–29)
COLOR UR: YELLOW
CREAT SERPL-MCNC: 0.83 MG/DL (ref 0.57–1)
DEPRECATED RDW RBC AUTO: 41 FL (ref 37–54)
EOSINOPHIL # BLD AUTO: 0.05 10*3/MM3 (ref 0–0.4)
EOSINOPHIL NFR BLD AUTO: 0.6 % (ref 0.3–6.2)
ERYTHROCYTE [DISTWIDTH] IN BLOOD BY AUTOMATED COUNT: 13.4 % (ref 12.3–15.4)
GFR SERPL CREATININE-BSD FRML MDRD: 71 ML/MIN/1.73
GLOBULIN UR ELPH-MCNC: 2.5 GM/DL
GLUCOSE SERPL-MCNC: 117 MG/DL (ref 65–99)
GLUCOSE UR STRIP-MCNC: NEGATIVE MG/DL
HCT VFR BLD AUTO: 44 % (ref 34–46.6)
HGB BLD-MCNC: 14.4 G/DL (ref 12–15.9)
HGB UR QL STRIP.AUTO: NEGATIVE
HOLD SPECIMEN: NORMAL
HOLD SPECIMEN: NORMAL
HYALINE CASTS UR QL AUTO: ABNORMAL /LPF
IMM GRANULOCYTES # BLD AUTO: 0.04 10*3/MM3 (ref 0–0.05)
IMM GRANULOCYTES NFR BLD AUTO: 0.5 % (ref 0–0.5)
KETONES UR QL STRIP: NEGATIVE
LEUKOCYTE ESTERASE UR QL STRIP.AUTO: ABNORMAL
LYMPHOCYTES # BLD AUTO: 0.55 10*3/MM3 (ref 0.7–3.1)
LYMPHOCYTES NFR BLD AUTO: 7.1 % (ref 19.6–45.3)
MAGNESIUM SERPL-MCNC: 2.1 MG/DL (ref 1.6–2.6)
MCH RBC QN AUTO: 27.4 PG (ref 26.6–33)
MCHC RBC AUTO-ENTMCNC: 32.7 G/DL (ref 31.5–35.7)
MCV RBC AUTO: 83.7 FL (ref 79–97)
MONOCYTES # BLD AUTO: 0.32 10*3/MM3 (ref 0.1–0.9)
MONOCYTES NFR BLD AUTO: 4.1 % (ref 5–12)
NEUTROPHILS NFR BLD AUTO: 6.79 10*3/MM3 (ref 1.7–7)
NEUTROPHILS NFR BLD AUTO: 87.3 % (ref 42.7–76)
NITRITE UR QL STRIP: NEGATIVE
NRBC BLD AUTO-RTO: 0 /100 WBC (ref 0–0.2)
PH UR STRIP.AUTO: <=5 [PH] (ref 5–8)
PLATELET # BLD AUTO: 194 10*3/MM3 (ref 140–450)
PMV BLD AUTO: 10 FL (ref 6–12)
POTASSIUM SERPL-SCNC: 4.2 MMOL/L (ref 3.5–5.2)
PROT SERPL-MCNC: 6.9 G/DL (ref 6–8.5)
PROT UR QL STRIP: NEGATIVE
RBC # BLD AUTO: 5.26 10*6/MM3 (ref 3.77–5.28)
RBC # UR: ABNORMAL /HPF
REF LAB TEST METHOD: ABNORMAL
SODIUM SERPL-SCNC: 141 MMOL/L (ref 136–145)
SP GR UR STRIP: 1.01 (ref 1–1.03)
SQUAMOUS #/AREA URNS HPF: ABNORMAL /HPF
TROPONIN T SERPL-MCNC: <0.01 NG/ML (ref 0–0.03)
UROBILINOGEN UR QL STRIP: ABNORMAL
WBC # BLD AUTO: 7.78 10*3/MM3 (ref 3.4–10.8)
WBC UR QL AUTO: ABNORMAL /HPF
WHOLE BLOOD HOLD SPECIMEN: NORMAL
WHOLE BLOOD HOLD SPECIMEN: NORMAL

## 2020-07-31 PROCEDURE — 70551 MRI BRAIN STEM W/O DYE: CPT

## 2020-07-31 PROCEDURE — 71045 X-RAY EXAM CHEST 1 VIEW: CPT

## 2020-07-31 PROCEDURE — 93005 ELECTROCARDIOGRAM TRACING: CPT | Performed by: EMERGENCY MEDICINE

## 2020-07-31 PROCEDURE — 25010000002 LORAZEPAM PER 2 MG: Performed by: EMERGENCY MEDICINE

## 2020-07-31 PROCEDURE — 84484 ASSAY OF TROPONIN QUANT: CPT | Performed by: EMERGENCY MEDICINE

## 2020-07-31 PROCEDURE — 99285 EMERGENCY DEPT VISIT HI MDM: CPT

## 2020-07-31 PROCEDURE — 83735 ASSAY OF MAGNESIUM: CPT | Performed by: EMERGENCY MEDICINE

## 2020-07-31 PROCEDURE — 85025 COMPLETE CBC W/AUTO DIFF WBC: CPT | Performed by: EMERGENCY MEDICINE

## 2020-07-31 PROCEDURE — 80053 COMPREHEN METABOLIC PANEL: CPT | Performed by: EMERGENCY MEDICINE

## 2020-07-31 PROCEDURE — 81001 URINALYSIS AUTO W/SCOPE: CPT | Performed by: EMERGENCY MEDICINE

## 2020-07-31 PROCEDURE — 96374 THER/PROPH/DIAG INJ IV PUSH: CPT

## 2020-07-31 RX ORDER — ONDANSETRON 2 MG/ML
4 INJECTION INTRAMUSCULAR; INTRAVENOUS ONCE
Status: DISCONTINUED | OUTPATIENT
Start: 2020-07-31 | End: 2020-07-31

## 2020-07-31 RX ORDER — MECLIZINE HYDROCHLORIDE 25 MG/1
25 TABLET ORAL 3 TIMES DAILY PRN
Qty: 15 TABLET | Refills: 0 | Status: SHIPPED | OUTPATIENT
Start: 2020-07-31 | End: 2021-03-01

## 2020-07-31 RX ORDER — SODIUM CHLORIDE 0.9 % (FLUSH) 0.9 %
10 SYRINGE (ML) INJECTION AS NEEDED
Status: DISCONTINUED | OUTPATIENT
Start: 2020-07-31 | End: 2020-08-01 | Stop reason: HOSPADM

## 2020-07-31 RX ORDER — LORAZEPAM 2 MG/ML
1 INJECTION INTRAMUSCULAR ONCE
Status: COMPLETED | OUTPATIENT
Start: 2020-07-31 | End: 2020-07-31

## 2020-07-31 RX ORDER — MECLIZINE HYDROCHLORIDE 25 MG/1
25 TABLET ORAL ONCE
Status: COMPLETED | OUTPATIENT
Start: 2020-07-31 | End: 2020-07-31

## 2020-07-31 RX ADMIN — MECLIZINE HYDROCHLORIDE 25 MG: 25 TABLET ORAL at 23:07

## 2020-07-31 RX ADMIN — LORAZEPAM 1 MG: 2 INJECTION INTRAMUSCULAR; INTRAVENOUS at 21:22

## 2020-08-01 ENCOUNTER — APPOINTMENT (OUTPATIENT)
Dept: MAMMOGRAPHY | Facility: HOSPITAL | Age: 55
End: 2020-08-01

## 2020-08-01 NOTE — ED PROVIDER NOTES
Subjective   Ms Becerra is a 54 yo female who awoke early two mornings ago with dizziness. She describes the room as spinning. She ultimately did vomit. Happily, the symptoms receded and she was able to have a fairly normal day. However, she has had some additional bouts of dizziness which she describes as the room moving and has vomited when the dizziness is severe enough. Today she has had 5-6 episodes of diarrhea. She feels like she is falling over when she walks and is dizzy. She hasn't had a problem like this previously. She is not having any difficulty with unilateral weakness, swallowing, vision, or speaking. Minor risk factors for stroke. She hasn't had anything available to take for the dizziness or the nausea. When dizzy she feels better holding still.  Past and social histories reviewed      History provided by:  Patient      Review of Systems   Constitutional: Negative.  Negative for fever.   HENT: Negative.  Negative for congestion.    Respiratory: Negative.  Negative for shortness of breath.    Cardiovascular: Negative.  Negative for chest pain and palpitations.   Gastrointestinal: Positive for diarrhea, nausea and vomiting.   Genitourinary: Negative for difficulty urinating.   Neurological: Positive for dizziness.   Psychiatric/Behavioral: Negative.    All other systems reviewed and are negative.      Past Medical History:   Diagnosis Date   • Endometrial cancer (CMS/HCC) 11/28/2018   • Essential hypertension 8/6/2018   • Hx of radiation therapy        Allergies   Allergen Reactions   • Lisinopril Cough       Past Surgical History:   Procedure Laterality Date   • D&C HYSTEROSCOPY MYOSURE  11/05/2018   • OOPHORECTOMY     • TOTAL LAPAROSCOPIC HYSTERECTOMY WITH DAVINCI ROBOT  11/30/2018    laproscopic with BSO, lymph node dissection, left parametrectomy, lysis ureters, Dr Jaffe. Cancer   • TOTAL LAPAROSCOPIC HYSTERECTOMY, LAPAROSCOPIC NODE DISSECTION N/A 11/30/2018    Procedure: TOTAL LAPAROSCOPIC  HYSTERECTOMY WITH DAVINCI ROBOT, BILATERAL SALPINGO OOPHORECTOMY, WITH RADICAL LEFT PARAMETRECTOMY, BILATERAL UTERETAL LYSIS, PELVIC LYMPH NODE DISSECTION;  Surgeon: April Jaffe MD;  [facility];  Service: DaVinci   • TUBAL ABDOMINAL LIGATION         Family History   Problem Relation Age of Onset   • Breast cancer Cousin 55        50's   • Arthritis Mother    • Heart attack Mother    • Cancer Father         prostate   • Heart attack Father    • Hypertension Father    • Stroke Father    • Diabetes Sister         type 1   • Diabetes Brother         type 1   • Stroke Paternal Grandmother    • Ovarian cancer Neg Hx        Social History     Socioeconomic History   • Marital status:      Spouse name: Not on file   • Number of children: Not on file   • Years of education: Not on file   • Highest education level: Not on file   Tobacco Use   • Smoking status: Former Smoker     Packs/day: 0.75     Years: 12.00     Pack years: 9.00     Types: Cigarettes     Last attempt to quit:      Years since quittin.6   • Smokeless tobacco: Never Used   Substance and Sexual Activity   • Alcohol use: No   • Drug use: No   • Sexual activity: Yes     Partners: Male           Objective   Physical Exam   Constitutional: She is oriented to person, place, and time. She appears well-developed and well-nourished. No distress.   HENT:   Head: Atraumatic.   Airway patent   Eyes: Conjunctivae are normal.   No nystagmus with EOM.  EOMI.   Neck: Phonation normal. Neck supple.   Cardiovascular: Normal rate, regular rhythm and normal heart sounds.   Pulmonary/Chest: Effort normal and breath sounds normal. No respiratory distress.   Abdominal: Soft. There is no tenderness.   Musculoskeletal: She exhibits no edema.   Neurological: She is alert and oriented to person, place, and time. No sensory deficit. She exhibits normal muscle tone.   No UE drift   Skin: Skin is warm and dry.   Psychiatric: She has a normal mood and  affect. Her behavior is normal.   Nursing note and vitals reviewed.      Procedures           ED Course  ED Course as of Aug 01 0040   Fri Jul 31, 2020   2244 She is still having vertigo and is preferring to lie with her eyes closed. Nausea not too bad at this moment.    [LI]      ED Course User Index  [LI] Joe Arellano MD                 Recent Results (from the past 24 hour(s))   Comprehensive Metabolic Panel    Collection Time: 07/31/20  7:03 PM   Result Value Ref Range    Glucose 117 (H) 65 - 99 mg/dL    BUN 10 6 - 20 mg/dL    Creatinine 0.83 0.57 - 1.00 mg/dL    Sodium 141 136 - 145 mmol/L    Potassium 4.2 3.5 - 5.2 mmol/L    Chloride 101 98 - 107 mmol/L    CO2 27.0 22.0 - 29.0 mmol/L    Calcium 9.5 8.6 - 10.5 mg/dL    Total Protein 6.9 6.0 - 8.5 g/dL    Albumin 4.40 3.50 - 5.20 g/dL    ALT (SGPT) 70 (H) 1 - 33 U/L    AST (SGOT) 39 (H) 1 - 32 U/L    Alkaline Phosphatase 126 (H) 39 - 117 U/L    Total Bilirubin 0.5 0.0 - 1.2 mg/dL    eGFR Non African Amer 71 >60 mL/min/1.73    Globulin 2.5 gm/dL    A/G Ratio 1.8 g/dL    BUN/Creatinine Ratio 12.0 7.0 - 25.0    Anion Gap 13.0 5.0 - 15.0 mmol/L   Troponin    Collection Time: 07/31/20  7:03 PM   Result Value Ref Range    Troponin T <0.010 0.000 - 0.030 ng/mL   Magnesium    Collection Time: 07/31/20  7:03 PM   Result Value Ref Range    Magnesium 2.1 1.6 - 2.6 mg/dL   Light Blue Top    Collection Time: 07/31/20  7:03 PM   Result Value Ref Range    Extra Tube hold for add-on    Green Top (Gel)    Collection Time: 07/31/20  7:03 PM   Result Value Ref Range    Extra Tube Hold for add-ons.    Lavender Top    Collection Time: 07/31/20  7:03 PM   Result Value Ref Range    Extra Tube hold for add-on    Gold Top - SST    Collection Time: 07/31/20  7:03 PM   Result Value Ref Range    Extra Tube Hold for add-ons.    CBC Auto Differential    Collection Time: 07/31/20  7:03 PM   Result Value Ref Range    WBC 7.78 3.40 - 10.80 10*3/mm3    RBC 5.26 3.77 - 5.28 10*6/mm3     Hemoglobin 14.4 12.0 - 15.9 g/dL    Hematocrit 44.0 34.0 - 46.6 %    MCV 83.7 79.0 - 97.0 fL    MCH 27.4 26.6 - 33.0 pg    MCHC 32.7 31.5 - 35.7 g/dL    RDW 13.4 12.3 - 15.4 %    RDW-SD 41.0 37.0 - 54.0 fl    MPV 10.0 6.0 - 12.0 fL    Platelets 194 140 - 450 10*3/mm3    Neutrophil % 87.3 (H) 42.7 - 76.0 %    Lymphocyte % 7.1 (L) 19.6 - 45.3 %    Monocyte % 4.1 (L) 5.0 - 12.0 %    Eosinophil % 0.6 0.3 - 6.2 %    Basophil % 0.4 0.0 - 1.5 %    Immature Grans % 0.5 0.0 - 0.5 %    Neutrophils, Absolute 6.79 1.70 - 7.00 10*3/mm3    Lymphocytes, Absolute 0.55 (L) 0.70 - 3.10 10*3/mm3    Monocytes, Absolute 0.32 0.10 - 0.90 10*3/mm3    Eosinophils, Absolute 0.05 0.00 - 0.40 10*3/mm3    Basophils, Absolute 0.03 0.00 - 0.20 10*3/mm3    Immature Grans, Absolute 0.04 0.00 - 0.05 10*3/mm3    nRBC 0.0 0.0 - 0.2 /100 WBC   Urinalysis With Microscopic If Indicated (No Culture) - Urine, Clean Catch    Collection Time: 07/31/20  7:22 PM   Result Value Ref Range    Color, UA Yellow Yellow, Straw    Appearance, UA Clear Clear    pH, UA <=5.0 5.0 - 8.0    Specific Gravity, UA 1.009 1.001 - 1.030    Glucose, UA Negative Negative    Ketones, UA Negative Negative    Bilirubin, UA Negative Negative    Blood, UA Negative Negative    Protein, UA Negative Negative    Leuk Esterase, UA Moderate (2+) (A) Negative    Nitrite, UA Negative Negative    Urobilinogen, UA 0.2 E.U./dL 0.2 - 1.0 E.U./dL   Urinalysis, Microscopic Only - Urine, Clean Catch    Collection Time: 07/31/20  7:22 PM   Result Value Ref Range    RBC, UA 0-2 None Seen, 0-2 /HPF    WBC, UA 13-20 (A) None Seen, 0-2 /HPF    Bacteria, UA Trace None Seen, Trace /HPF    Squamous Epithelial Cells, UA 0-2 None Seen, 0-2 /HPF    Hyaline Casts, UA 0-6 0 - 6 /LPF    Methodology Automated Microscopy      Note: In addition to lab results from this visit, the labs listed above may include labs taken at another facility or during a different encounter within the last 24 hours. Please correlate  lab times with ED admission and discharge times for further clarification of the services performed during this visit.    MRI Brain Without Contrast         XR Chest 1 View   Final Result   No acute disease.             Signer Name: Leyda Garzon MD    Signed: 7/31/2020 8:02 PM    Workstation Name: [id number]     [facility]        Vitals:    07/31/20 2030 07/31/20 2100 07/31/20 2130 07/31/20 2311   BP: 155/89 150/91  (!) 167/106   BP Location:  Right arm  Left arm   Patient Position:  Lying  Sitting   Pulse: 67 67 75 75   Resp:  16  16   Temp:       TempSrc:       SpO2: 99% 97% 96% 97%   Weight:       Height:         Medications   sodium chloride 0.9 % flush 10 mL (has no administration in time range)   LORazepam (ATIVAN) injection 1 mg (1 mg Intravenous Given 7/31/20 2122)   meclizine (ANTIVERT) tablet 25 mg (25 mg Oral Given 7/31/20 2307)     ECG/EMG Results (last 24 hours)     Procedure Component Value Units Date/Time    ECG 12 Lead [[id number]] Collected:  07/31/20 1853     Updated:  07/31/20 2150    Narrative:       Test Reason : Weak/Dizzy/AMS protocol  Blood Pressure : **/** mmHG  Vent. Rate : 074 BPM     Atrial Rate : 074 BPM     P-R Int : 148 ms          QRS Dur : 090 ms      QT Int : 410 ms       P-R-T Axes : 060 023 072 degrees     QTc Int : 455 ms    Normal sinus rhythm  Nonspecific ST abnormality  Abnormal ECG  When compared with ECG of 02-NOV-2018 13:06,  Nonspecific T wave abnormality now evident in Lateral leads  Confirmed by POP AKERS MD ([id number]) on 7/31/2020 9:50:20 PM    Referred By:  CRESCENCIO           Confirmed By:POP AKERS MD        ECG 12 Lead   Final Result   Test Reason : Weak/Dizzy/AMS protocol   Blood Pressure : **/** mmHG   Vent. Rate : 074 BPM     Atrial Rate : 074 BPM      P-R Int : 148 ms          QRS Dur : 090 ms       QT Int : 410 ms       P-R-T Axes : 060 023 072 degrees      QTc Int : 455 ms      Normal sinus rhythm   Nonspecific ST abnormality    Abnormal ECG   When compared with ECG of 02-NOV-2018 13:06,   Nonspecific T wave abnormality now evident in Lateral leads   Confirmed by JOE ARELLANO MD ([id number]) on 7/31/2020 9:50:20 PM      Referred By:  EDMD           Confirmed By:JOE ARELLANO MD                                     [facility]    Final diagnoses:   Benign paroxysmal positional vertigo, unspecified laterality            Joe Arellano MD  08/01/20 0041

## 2020-08-03 ENCOUNTER — TELEPHONE (OUTPATIENT)
Dept: INTERNAL MEDICINE | Facility: CLINIC | Age: 55
End: 2020-08-03

## 2020-08-03 NOTE — TELEPHONE ENCOUNTER
STATES THAT PT WAS SEEN AT [facility] Friday 7/31 AND WAS DIAGNOSED WITH  BENIGN POSITIONAL VERTIGO.    PT'S BLOOD PRESSURE /100    WOULD LIKE TO BE ADVISED ON WHAT TO DO ABOUT THE BP     WOULD ALSO LIKE TO KNOW IF IT IS WORTH SEEING A  EAR, NOSE, AND THROAT DRCal AND IF SO WHO WOULD YOU REFER.      PLEASE ADVISE  [phone]

## 2020-08-04 NOTE — TELEPHONE ENCOUNTER
Vertigo can be transient from virus  If persists to ENT,  BP before this one was good, need to watch pressures, to avoid lowering BP too much

## 2020-08-04 NOTE — TELEPHONE ENCOUNTER
Attempted to contact [facility],  and had to leave a msg. Wanted to forward this to you if you wanted to try and contact them later.

## 2020-08-06 NOTE — TELEPHONE ENCOUNTER
Pt was notified of information from Dr. Cope. She states she is doing a little better. She has an ENT appt on Monday and will proceed to that appt. Her bps have been running like, 130//97 not much higher. She has appt on 8/27/20 that she will keep with Dr. Cope

## 2020-08-19 ENCOUNTER — OFFICE VISIT (OUTPATIENT)
Dept: RADIATION ONCOLOGY | Facility: HOSPITAL | Age: 55
End: 2020-08-19

## 2020-08-19 ENCOUNTER — HOSPITAL ENCOUNTER (OUTPATIENT)
Dept: RADIATION ONCOLOGY | Facility: HOSPITAL | Age: 55
Setting detail: RADIATION/ONCOLOGY SERIES
Discharge: HOME | End: 2020-08-19

## 2020-08-19 VITALS
DIASTOLIC BLOOD PRESSURE: 75 MMHG | BODY MASS INDEX: 40.02 KG/M2 | WEIGHT: 249 LBS | TEMPERATURE: 97.5 F | OXYGEN SATURATION: 97 % | HEART RATE: 76 BPM | HEIGHT: 66 IN | RESPIRATION RATE: 16 BRPM | SYSTOLIC BLOOD PRESSURE: 129 MMHG

## 2020-08-19 DIAGNOSIS — C54.1 ENDOMETRIAL CANCER (HCC): Primary | ICD-10-CM

## 2020-08-19 PROCEDURE — G0463 HOSPITAL OUTPT CLINIC VISIT: HCPCS

## 2020-08-19 NOTE — PROGRESS NOTES
FOLLOW UP NOTE    PATIENT:                                                      Tiarra Becerra  MEDICAL RECORD #:                        [id number]  :                                                          1965  COMPLETION DATE:   2019  DIAGNOSIS:     Endometrial cancer (CMS/HCC)  - FIGO Stage IB (cT1, cN0, cM0)      BRIEF HISTORY:   Mrs. Becerra is a 55 y.o. female who returns to clinic today for routine follow-up.  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  She completed a course of pelvic radiation followed by intracavitary brachytherapy in 2019.   She has been without evidence of disease since that time.  Clinically, she is doing well.  She denies changes in bowel or bladder, and specifically denies gynecologic complaints including bleeding, discharge, or dyspareunia.  She has no specific site of pain.  She underwent CT abdomen/pelvis 2020 per her PCP regarding a history of fatty liver and recently elevated LFTs.  Aside from a stable 1.6 cm splenic lesion previously described as a hemangioma and present/stable on scans since 2018, she did not have any acute findings.  She also presented to the ED recently with c/o vertigo but MRI brain was negative.  She was referred to ENT for workup of suspected Meniere's disease.  Otherwise, she feels good.      MEDICATIONS: Medication reconciliation for the patient was reviewed and confirmed in the electronic medical record.    Review of Systems   HENT:   Positive for tinnitus.    Neurological: Positive for dizziness and light-headedness.   All other systems reviewed and are negative.      KPS 90%    Physical Exam   Constitutional: She is oriented to person, place, and time. She appears well-developed and well-nourished. No distress.   HENT:   Head: Normocephalic and atraumatic.   Eyes: Pupils are  "equal, round, and reactive to light. Conjunctivae are normal.   Neck: Normal range of motion. Neck supple.   Cardiovascular: Normal rate and regular rhythm. Exam reveals no friction rub.   No murmur heard.  Pulmonary/Chest: Effort normal and breath sounds normal. She has no wheezes.   Abdominal: Soft. Bowel sounds are normal. She exhibits no distension and no mass. There is no tenderness.   Genitourinary:   Genitourinary Comments: External genitalia are without lesions.  The vaginal cuff is smooth and without nodularity, masses, or ulcerations.  The remainder of the vaginal exam is normal.  Exam was well-tolerated.    Musculoskeletal: Normal range of motion. She exhibits no edema.   Lymphadenopathy:     She has no cervical adenopathy.        Right: No inguinal and no supraclavicular adenopathy present.        Left: No inguinal and no supraclavicular adenopathy present.   Neurological: She is alert and oriented to person, place, and time.   Skin: Skin is warm and dry.   Psychiatric: She has a normal mood and affect. Her behavior is normal. Judgment and thought content normal.   Nursing note and vitals reviewed.      VITAL SIGNS:   Vitals:    08/19/20 1403   BP: 129/75   Pulse: 76   Resp: 16   Temp: 97.5 °F (36.4 °C)   TempSrc: Temporal   SpO2: 97%  Comment: RA   Weight: 113 kg (249 lb)   Height: 167.6 cm (66\")   PainSc: 0-No pain       The following portions of the patient's history were reviewed and updated as appropriate: allergies, current medications, past family history, past medical history, past social history, past surgical history and problem list.    IMAGING:  I have personally reviewed the following imaging studies:    CT abdomen, liver protocol 6/11/2020 ([facility]):  Impression:  1. There is an arterial enhancing lesion seen within the splenic parenchyma, which is similar to the prior study.  This is indeterminate. Comparison with any remote images is recommended if available. Splenic lesions are often " indeterminate on imaging and can be neoplastic or benign.  2. Cholelithiasis.  3. Hepatosteatosis.  4. There is mild stranding within the mesentery, which has been termed lisa mesentery sign and is a relatively nonspecific finding but similar to mildly progressed when compared to the prior study.    MRI brain 7/31/2020:  Impression:  1.  No acute intracranial findings.  2.  Mild aging changes of the brain consisting of mild cerebral atrophy and minimal white matter microvascular disease.       Tiarra was seen today for endometrial cancer.    Diagnoses and all orders for this visit:    Endometrial cancer (CMS/HCC)         IMPRESSION:  Mrs. Becerra is a 55 y.o. female with history of a FIGO stage IB endometrioid adenocarcinoma.  She underwent a hysterectomy followed by adjuvant pelvic radiation and vaginal brachytherapy.  She completed treatment 1.5 years ago, and has no evidence of recurrent disease on exam.  She is scheduled to be seen again in the GYN oncology office in October, and I will schedule her to return to our clinic 3 months later, at which point she will be nearing 2 years out from therapy.        RECOMMENDATIONS:  Mrs. Becerra continues routine surveillance under the care of Dr. Jaffe.  Return in about 5 months (around 1/19/2021) for Office Visit.    LIANNA Duke

## 2020-08-27 ENCOUNTER — OFFICE VISIT (OUTPATIENT)
Dept: INTERNAL MEDICINE | Facility: CLINIC | Age: 55
End: 2020-08-27

## 2020-08-27 ENCOUNTER — LAB (OUTPATIENT)
Dept: LAB | Facility: HOSPITAL | Age: 55
End: 2020-08-27

## 2020-08-27 VITALS
TEMPERATURE: 97.1 F | BODY MASS INDEX: 40.5 KG/M2 | OXYGEN SATURATION: 98 % | HEART RATE: 74 BPM | HEIGHT: 66 IN | WEIGHT: 252 LBS | DIASTOLIC BLOOD PRESSURE: 60 MMHG | SYSTOLIC BLOOD PRESSURE: 110 MMHG

## 2020-08-27 DIAGNOSIS — I10 ESSENTIAL HYPERTENSION: Primary | ICD-10-CM

## 2020-08-27 DIAGNOSIS — R73.09 ABNORMAL GLUCOSE: ICD-10-CM

## 2020-08-27 DIAGNOSIS — R53.83 OTHER FATIGUE: ICD-10-CM

## 2020-08-27 LAB
25(OH)D3 SERPL-MCNC: 41.2 NG/ML (ref 30–100)
ALBUMIN SERPL-MCNC: 4.2 G/DL (ref 3.5–5.2)
ALBUMIN/GLOB SERPL: 1.7 G/DL
ALP SERPL-CCNC: 107 U/L (ref 39–117)
ALT SERPL W P-5'-P-CCNC: 41 U/L (ref 1–33)
ANION GAP SERPL CALCULATED.3IONS-SCNC: 9.3 MMOL/L (ref 5–15)
AST SERPL-CCNC: 21 U/L (ref 1–32)
BASOPHILS # BLD AUTO: 0.03 10*3/MM3 (ref 0–0.2)
BASOPHILS NFR BLD AUTO: 0.8 % (ref 0–1.5)
BILIRUB SERPL-MCNC: 0.4 MG/DL (ref 0–1.2)
BUN SERPL-MCNC: 10 MG/DL (ref 6–20)
BUN/CREAT SERPL: 15.4 (ref 7–25)
CALCIUM SPEC-SCNC: 9.8 MG/DL (ref 8.6–10.5)
CHLORIDE SERPL-SCNC: 102 MMOL/L (ref 98–107)
CO2 SERPL-SCNC: 26.7 MMOL/L (ref 22–29)
CREAT SERPL-MCNC: 0.65 MG/DL (ref 0.57–1)
DEPRECATED RDW RBC AUTO: 41.6 FL (ref 37–54)
EOSINOPHIL # BLD AUTO: 0.13 10*3/MM3 (ref 0–0.4)
EOSINOPHIL NFR BLD AUTO: 3.3 % (ref 0.3–6.2)
ERYTHROCYTE [DISTWIDTH] IN BLOOD BY AUTOMATED COUNT: 13.7 % (ref 12.3–15.4)
FOLATE SERPL-MCNC: >20 NG/ML (ref 4.78–24.2)
GFR SERPL CREATININE-BSD FRML MDRD: 94 ML/MIN/1.73
GLOBULIN UR ELPH-MCNC: 2.5 GM/DL
GLUCOSE SERPL-MCNC: 102 MG/DL (ref 65–99)
HBA1C MFR BLD: 5.5 % (ref 4.8–5.6)
HCT VFR BLD AUTO: 39.2 % (ref 34–46.6)
HGB BLD-MCNC: 13.2 G/DL (ref 12–15.9)
IMM GRANULOCYTES # BLD AUTO: 0.01 10*3/MM3 (ref 0–0.05)
IMM GRANULOCYTES NFR BLD AUTO: 0.3 % (ref 0–0.5)
LYMPHOCYTES # BLD AUTO: 0.54 10*3/MM3 (ref 0.7–3.1)
LYMPHOCYTES NFR BLD AUTO: 13.8 % (ref 19.6–45.3)
MCH RBC QN AUTO: 28.1 PG (ref 26.6–33)
MCHC RBC AUTO-ENTMCNC: 33.7 G/DL (ref 31.5–35.7)
MCV RBC AUTO: 83.6 FL (ref 79–97)
MONOCYTES # BLD AUTO: 0.31 10*3/MM3 (ref 0.1–0.9)
MONOCYTES NFR BLD AUTO: 7.9 % (ref 5–12)
NEUTROPHILS NFR BLD AUTO: 2.9 10*3/MM3 (ref 1.7–7)
NEUTROPHILS NFR BLD AUTO: 73.9 % (ref 42.7–76)
NRBC BLD AUTO-RTO: 0 /100 WBC (ref 0–0.2)
PLATELET # BLD AUTO: 204 10*3/MM3 (ref 140–450)
PMV BLD AUTO: 10.7 FL (ref 6–12)
POTASSIUM SERPL-SCNC: 4.7 MMOL/L (ref 3.5–5.2)
PROT SERPL-MCNC: 6.7 G/DL (ref 6–8.5)
RBC # BLD AUTO: 4.69 10*6/MM3 (ref 3.77–5.28)
SODIUM SERPL-SCNC: 138 MMOL/L (ref 136–145)
T4 FREE SERPL-MCNC: 1.17 NG/DL (ref 0.93–1.7)
TSH SERPL DL<=0.05 MIU/L-ACNC: 2.05 UIU/ML (ref 0.27–4.2)
VIT B12 BLD-MCNC: 458 PG/ML (ref 211–946)
WBC # BLD AUTO: 3.92 10*3/MM3 (ref 3.4–10.8)

## 2020-08-27 PROCEDURE — 82306 VITAMIN D 25 HYDROXY: CPT

## 2020-08-27 PROCEDURE — 85025 COMPLETE CBC W/AUTO DIFF WBC: CPT

## 2020-08-27 PROCEDURE — 99214 OFFICE O/P EST MOD 30 MIN: CPT | Performed by: FAMILY MEDICINE

## 2020-08-27 PROCEDURE — 80053 COMPREHEN METABOLIC PANEL: CPT

## 2020-08-27 PROCEDURE — 82746 ASSAY OF FOLIC ACID SERUM: CPT

## 2020-08-27 PROCEDURE — 83036 HEMOGLOBIN GLYCOSYLATED A1C: CPT

## 2020-08-27 PROCEDURE — 82607 VITAMIN B-12: CPT

## 2020-08-27 PROCEDURE — 84443 ASSAY THYROID STIM HORMONE: CPT

## 2020-08-27 PROCEDURE — 84439 ASSAY OF FREE THYROXINE: CPT

## 2020-08-27 RX ORDER — LOSARTAN POTASSIUM 100 MG/1
100 TABLET ORAL DAILY
Qty: 90 TABLET | Refills: 1 | Status: SHIPPED | OUTPATIENT
Start: 2020-08-27 | End: 2021-03-01 | Stop reason: SDUPTHER

## 2020-08-27 RX ORDER — METOPROLOL SUCCINATE 25 MG/1
25 TABLET, EXTENDED RELEASE ORAL DAILY
Qty: 90 TABLET | Refills: 1 | Status: SHIPPED | OUTPATIENT
Start: 2020-08-27 | End: 2021-03-01 | Stop reason: DRUGHIGH

## 2020-08-27 NOTE — PROGRESS NOTES
"Subjective   Tiarra Becerra is a 55 y.o. female.     Chief Complaint   Patient presents with   • Hypertension       Visit Vitals  /60 (BP Location: Left arm, Patient Position: Sitting, Cuff Size: Large Adult)   Pulse 74   Temp 97.1 °F (36.2 °C) (Infrared)   Ht 167.6 cm (66\")   Wt 114 kg (252 lb)   LMP 01/02/2018   SpO2 98%   BMI 40.67 kg/m²       Wt Readings from Last 3 Encounters:   08/27/20 114 kg (252 lb)   08/19/20 113 kg (249 lb)   07/31/20 116 kg (255 lb)         Hypertension   This is a chronic problem. The current episode started more than 1 year ago. The problem is unchanged. The problem is controlled. Associated symptoms include malaise/fatigue. Pertinent negatives include no anxiety, blurred vision, chest pain, headaches, neck pain, orthopnea, palpitations, peripheral edema, PND, shortness of breath or sweats. Risk factors for coronary artery disease include dyslipidemia and obesity. Current antihypertension treatment includes angiotensin blockers and beta blockers. The current treatment provides significant improvement. There are no compliance problems.  There is no history of angina, kidney disease, CAD/MI, CVA, heart failure, left ventricular hypertrophy, PVD or retinopathy. There is no history of sleep apnea or a thyroid problem.   Fatigue   This is a new problem. The current episode started more than 1 month ago. The problem occurs constantly. The problem has been unchanged. Associated symptoms include fatigue and vertigo. Pertinent negatives include no abdominal pain, anorexia, arthralgias, change in bowel habit, chest pain, chills, congestion, coughing, diaphoresis, fever, headaches, joint swelling, myalgias, nausea, neck pain, numbness, rash, sore throat, swollen glands, urinary symptoms, visual change, vomiting or weakness. Nothing aggravates the symptoms. She has tried nothing for the symptoms. The treatment provided mild relief.      Pt has had vertigo since July 29th with fatigue. Pt's " "ENT Dr Pantoja, is working her up from [employer]. Pt has had some improvement in the vertigo, to \"off balance\".  Pt cannot turn head quickly. Pt's fatigue better as of 3 days ago.   The following portions of the patient's history were reviewed and updated as appropriate: allergies, current medications, past family history, past medical history, past social history, past surgical history and problem list.    Past Medical History:   Diagnosis Date   • Endometrial cancer (CMS/HCC) 11/28/2018   • Essential hypertension 8/6/2018   • Hx of radiation therapy       Past Surgical History:   Procedure Laterality Date   • D&C HYSTEROSCOPY MYOSURE  11/05/2018   • OOPHORECTOMY     • TOTAL LAPAROSCOPIC HYSTERECTOMY WITH DAVINCI ROBOT  11/30/2018    laproscopic with BSO, lymph node dissection, left parametrectomy, lysis ureters, Dr Jaffe. Cancer   • TOTAL LAPAROSCOPIC HYSTERECTOMY, LAPAROSCOPIC NODE DISSECTION N/A 11/30/2018    Procedure: TOTAL LAPAROSCOPIC HYSTERECTOMY WITH DAVINCI ROBOT, BILATERAL SALPINGO OOPHORECTOMY, WITH RADICAL LEFT PARAMETRECTOMY, BILATERAL UTERETAL LYSIS, PELVIC LYMPH NODE DISSECTION;  Surgeon: April Jaffe MD;  Location: [facility];  Service: DaVinci   • TUBAL ABDOMINAL LIGATION  2006      Family History   Problem Relation Age of Onset   • Breast cancer Cousin 55        50's   • Arthritis Mother    • Heart attack Mother    • Cancer Father         prostate   • Heart attack Father    • Hypertension Father    • Stroke Father    • Diabetes Sister         type 1   • Diabetes Brother         type 1   • Stroke Paternal Grandmother    • Ovarian cancer Neg Hx       Social History     Socioeconomic History   • Marital status:      Spouse name: Not on file   • Number of children: Not on file   • Years of education: Not on file   • Highest education level: Not on file   Tobacco Use   • Smoking status: Former Smoker     Packs/day: 0.75     Years: 12.00     Pack years: 9.00     Types: Cigarettes     " Last attempt to quit:      Years since quittin.6   • Smokeless tobacco: Never Used   Substance and Sexual Activity   • Alcohol use: No   • Drug use: No   • Sexual activity: Yes     Partners: Male      Allergies   Allergen Reactions   • Lisinopril Cough       Review of Systems   Constitutional: Positive for fatigue and malaise/fatigue. Negative for chills, diaphoresis and fever.   HENT: Negative.  Negative for congestion, ear pain, nosebleeds, postnasal drip, rhinorrhea, sinus pressure, sneezing and sore throat.    Eyes: Negative.  Negative for blurred vision, redness and itching.   Respiratory: Negative.  Negative for cough, shortness of breath and wheezing.    Cardiovascular: Negative.  Negative for chest pain, palpitations, orthopnea and PND.   Gastrointestinal: Negative.  Negative for abdominal pain, anorexia, change in bowel habit, constipation, diarrhea, nausea and vomiting.   Endocrine: Negative.  Negative for cold intolerance and heat intolerance.   Genitourinary: Negative.  Negative for dysuria, frequency, hematuria and urgency.   Musculoskeletal: Negative.  Negative for arthralgias, back pain, joint swelling, myalgias and neck pain.   Skin: Negative.  Negative for color change and rash.   Allergic/Immunologic: Negative.  Negative for environmental allergies.   Neurological: Positive for dizziness and vertigo. Negative for syncope, weakness, light-headedness, numbness and headaches.   Hematological: Negative.  Negative for adenopathy. Does not bruise/bleed easily.   Psychiatric/Behavioral: Negative.  Negative for dysphoric mood and sleep disturbance. The patient is not nervous/anxious.        Objective   Physical Exam   Constitutional: She is oriented to person, place, and time. She appears well-developed.   HENT:   Head: Normocephalic.   Right Ear: External ear normal.   Left Ear: External ear normal.   Nose: Nose normal.   Eyes: Pupils are equal, round, and reactive to light. Conjunctivae, EOM and  lids are normal.   Neck: Trachea normal and normal range of motion. Neck supple. Carotid bruit is not present. No thyroid mass and no thyromegaly present.   Cardiovascular: Normal rate and regular rhythm.   No murmur heard.  Pulmonary/Chest: Effort normal and breath sounds normal. No respiratory distress. She has no decreased breath sounds. She has no wheezes. She has no rhonchi. She has no rales. She exhibits no tenderness.   Abdominal: Soft. Bowel sounds are normal. There is no tenderness.   Musculoskeletal: Normal range of motion.   Neurological: She is alert and oriented to person, place, and time.   Skin: Skin is warm and dry.   Psychiatric: She has a normal mood and affect. Her behavior is normal.   Nursing note and vitals reviewed.      Assessment/Plan   Tiarra was seen today for hypertension.    Diagnoses and all orders for this visit:    Essential hypertension  -     losartan (COZAAR) 100 MG tablet; Take 1 tablet by mouth Daily.  -     metoprolol succinate XL (TOPROL-XL) 25 MG 24 hr tablet; Take 1 tablet by mouth Daily.    Other fatigue  -     TSH; Future  -     T4, Free; Future  -     Vitamin B12; Future  -     Vitamin D 25 Hydroxy; Future  -     Folate; Future  -     CBC & Differential; Future  -     Comprehensive Metabolic Panel; Future    Abnormal glucose  -     Hemoglobin A1c; Future      Get flu shot this fall             Current Outpatient Medications:   •  cetirizine (ZyrTEC) 10 MG tablet, Take 10 mg by mouth Every Other Day., Disp: , Rfl:   •  Cholecalciferol (VITAMIN D-3 PO), Take 2,000 Units by mouth Daily., Disp: , Rfl:   •  losartan (COZAAR) 100 MG tablet, Take 1 tablet by mouth Daily., Disp: 90 tablet, Rfl: 1  •  metoprolol succinate XL (TOPROL-XL) 25 MG 24 hr tablet, Take 1 tablet by mouth Daily., Disp: 90 tablet, Rfl: 1  •  meclizine (ANTIVERT) 25 MG tablet, Take 1 tablet by mouth 3 (Three) Times a Day As Needed for Dizziness., Disp: 15 tablet, Rfl: 0  •  Omega-3 Fatty Acids (FISH OIL) 1000  MG capsule capsule, Take  by mouth Daily With Breakfast., Disp: , Rfl:     Return in about 6 months (around 2/27/2021), or if symptoms worsen or fail to improve, for Recheck.

## 2020-08-28 ENCOUNTER — TRANSCRIBE ORDERS (OUTPATIENT)
Dept: ADMINISTRATIVE | Facility: HOSPITAL | Age: 55
End: 2020-08-28

## 2020-09-10 ENCOUNTER — HOSPITAL ENCOUNTER (OUTPATIENT)
Dept: MAMMOGRAPHY | Facility: HOSPITAL | Age: 55
Discharge: HOME | End: 2020-09-10
Admitting: FAMILY MEDICINE

## 2020-09-10 DIAGNOSIS — Z12.31 ENCOUNTER FOR SCREENING MAMMOGRAM FOR MALIGNANT NEOPLASM OF BREAST: ICD-10-CM

## 2020-09-10 PROCEDURE — 77067 SCR MAMMO BI INCL CAD: CPT

## 2020-09-10 PROCEDURE — 77063 BREAST TOMOSYNTHESIS BI: CPT | Performed by: RADIOLOGY

## 2020-09-10 PROCEDURE — 77067 SCR MAMMO BI INCL CAD: CPT | Performed by: RADIOLOGY

## 2020-09-10 PROCEDURE — 77063 BREAST TOMOSYNTHESIS BI: CPT

## 2020-10-05 ENCOUNTER — OFFICE VISIT (OUTPATIENT)
Dept: GYNECOLOGIC ONCOLOGY | Facility: CLINIC | Age: 55
End: 2020-10-05

## 2020-10-05 VITALS
DIASTOLIC BLOOD PRESSURE: 84 MMHG | BODY MASS INDEX: 40.82 KG/M2 | OXYGEN SATURATION: 95 % | SYSTOLIC BLOOD PRESSURE: 140 MMHG | RESPIRATION RATE: 18 BRPM | HEART RATE: 73 BPM | WEIGHT: 254 LBS | HEIGHT: 66 IN | TEMPERATURE: 97.5 F

## 2020-10-05 DIAGNOSIS — C54.1 ENDOMETRIAL CANCER (HCC): Primary | ICD-10-CM

## 2020-10-05 PROCEDURE — 99213 OFFICE O/P EST LOW 20 MIN: CPT | Performed by: NURSE PRACTITIONER

## 2020-10-05 NOTE — PROGRESS NOTES
GYN ONCOLOGY CANCER SURVEILLANCE FOLLOW-UP    Tiarra Becerra  [id number]  1965    Chief Complaint: Follow-up (no complaints)        History of present illness:  Tiarra Becerra is a 55 y.o. year old female who is here today for ongoing surveillance of Endometrial Cancer, see Cancer History. She will reach 2 years from completion of treatment in Spring 2021. She has been alternating visits between our office and radiation oncology. She is scheduled to see Dr. Vincent next in January 2021.   She reports she is feeling very well today and has no complaints. She denies vaginal bleeding, pelvic pain, and changes in bowel or bladder function.          Cancer History:   Oncology/Hematology History   Endometrial cancer (CMS/HCC)   10/2018 Imaging    Patient presented to Dr. Davis for Annual exam with c/o new onset vaginal bleeding after amenorrhea x 8 months.   TVUS revealed large polyp vs fibroid in endocervical canal     11/5/2018 Procedure    ECC and Myosure by gynecologist. Pathology revealed grade 1 endometrioid adenocarcinoma in a background of complex atypical hyperplasia, suspicion for myoinvasion. Pt referred to Gyn Oncology     11/30/2018 Surgery    RTLH/BSO, radical left parametrectomy, left ureteral lysis, and pelvic lymph node dissection.   There was suspicion for concurrent cervical cancer at time of surgery, found to be multifocal grade 2-3 endometrioid carcinoma with extensive lower uterine segment involvement upon final pathology. Tumor invasive into over 50% of the myometrium (11/14 mm). Nodes negative. MSI testing normal. Stage IB grade 2-3     12/26/2018 Imaging    Post-op CT abdomen and pelvis negative     1/15/2019 - 2/18/2019 Radiation    Radiation OncologyTreatment Course:  Tiarra Becerra received 4500 cGy in 25 fractions to pelvis via External Beam Radiation - EBRT.     2/26/2019 - 2/28/2019 Radiation    Radiation OncologyTreatment Course:  Tiarra Becerra received 1200 cGy in 2 fractions to  vagina via High Dose Radiation - HDR.     6/12/2019 Imaging    CT chest, abdomen, pelvis negative     12/2/2019 Survivorship    Survivorship Care Plan completed and discussed with patient.  Copy of Survivorship Care Plan provided to patient and primary care provider.         Past Medical History:   Diagnosis Date   • Endometrial cancer (CMS/HCC) 11/28/2018   • Essential hypertension 8/6/2018   • Hx of radiation therapy        Past Surgical History:   Procedure Laterality Date   • D&C HYSTEROSCOPY MYOSURE  11/05/2018   • OOPHORECTOMY     • TOTAL LAPAROSCOPIC HYSTERECTOMY WITH DAVINCI ROBOT  11/30/2018    laproscopic with BSO, lymph node dissection, left parametrectomy, lysis ureters, Dr Jaffe. Cancer   • TOTAL LAPAROSCOPIC HYSTERECTOMY, LAPAROSCOPIC NODE DISSECTION N/A 11/30/2018    Procedure: TOTAL LAPAROSCOPIC HYSTERECTOMY WITH DAVINCI ROBOT, BILATERAL SALPINGO OOPHORECTOMY, WITH RADICAL LEFT PARAMETRECTOMY, BILATERAL UTERETAL LYSIS, PELVIC LYMPH NODE DISSECTION;  Surgeon: April Jaffe MD;  Location: [facility];  Service: DaVinci   • TUBAL ABDOMINAL LIGATION  2006       MEDICATIONS: The current medication list was reviewed and reconciled.     Allergies:  is allergic to lisinopril.    Family History   Problem Relation Age of Onset   • Breast cancer Cousin 55        50's   • Arthritis Mother    • Heart attack Mother    • Cancer Father         prostate   • Heart attack Father    • Hypertension Father    • Stroke Father    • Diabetes Sister         type 1   • Diabetes Brother         type 1   • Stroke Paternal Grandmother    • Ovarian cancer Neg Hx        Last imaging study was CT c/a/p 6/12/2019.     Review of Systems   Constitutional: Negative for appetite change, chills, fatigue, fever and unexpected weight change.   Respiratory: Negative for cough, shortness of breath and wheezing.    Cardiovascular: Negative for chest pain, palpitations and leg swelling.   Gastrointestinal: Negative for abdominal  "distention, abdominal pain, blood in stool, constipation, diarrhea, nausea and vomiting.   Endocrine: Negative.    Genitourinary: Negative for dyspareunia, dysuria, frequency, genital sores, hematuria, pelvic pain, urgency, vaginal bleeding, vaginal discharge and vaginal pain.   Musculoskeletal: Negative for arthralgias, gait problem and joint swelling.   Neurological: Negative for dizziness, seizures, syncope, weakness, light-headedness, numbness and headaches.   Hematological: Negative for adenopathy.   Psychiatric/Behavioral: Negative.          Physical Exam  Vital Signs: /84   Pulse 73   Temp 97.5 °F (36.4 °C) (Temporal)   Resp 18   Ht 167.6 cm (65.98\")   Wt 115 kg (254 lb)   LMP 01/02/2018   SpO2 95%   BMI 41.02 kg/m²   Vitals:    10/05/20 1540   PainSc: 0-No pain           General Appearance:  alert, cooperative, no apparent distress, appears stated age and obese   Neurologic/Psychiatric: A&O x 3, gait steady, appropriate affect   HEENT:  Normocephalic, without obvious abnormality, mucous membranes moist   Lungs:   Clear to auscultation bilaterally; respirations regular, even, and unlabored bilaterally   Heart:  Regular rate and rhythm, no murmurs appreciated   Breasts:  deferred   Abdomen:   Soft, non-tender, non-distended, no organomegaly and Exam limited d/t habitus.   Lymph nodes: No cervical, supraclavicular, inguinal adenopathy noted   Extremities: Normal, atraumatic; no clubbing, cyanosis, or edema    Pelvic: External Genitalia  without lesions or skin changes  Vagina  is pink, moist, without lesions.   Vaginal Cuff  Female Vaginal Cuff: smooth, intact and without visible lesions  Uterus  surgically absent, no palpable masses and exam limited d/t habitus  Ovaries  without palpable masses or fullness  Parametria  smooth  Rectovaginal  Female rectovaginal: deferred     ECOG Performance Status: (0) Fully Active - Able to Carry On All Pre-disease Performance Without Restriction    Procedure " Note:  No notes on file      Assessment and Plan:  Tiarra was seen today for follow-up.    Diagnoses and all orders for this visit:    Endometrial cancer (CMS/HCC)            There is no evidence of disease upon today's exam. Patient approaching 2 years since completion of treatment early next year, scheduled to see Dr. Vincent of radiation oncology in January 2021. We will see her 6 months after that time as long as she is doing well. She is understanding to call with any changes in pelvic symptoms or general GYN concerns at any time between regularly scheduled visits.     Pain assessment was performed today as a part of patient’s care.  For patients with pain related to surgery, gynecologic malignancy or cancer treatment, the plan is as noted in the assessment/plan.  For patients with pain not related to these issues, they are to seek any further needed care from a more appropriate provider, such as PCP.      Return to clinic in July 2021 for ongoing cancer surveillance or sooner PRN problems.      Electronically signed by LIANNA Baum on 10/05/20 at 16:01 EDT

## 2021-01-21 ENCOUNTER — OFFICE VISIT (OUTPATIENT)
Dept: RADIATION ONCOLOGY | Facility: HOSPITAL | Age: 56
End: 2021-01-21

## 2021-01-21 ENCOUNTER — HOSPITAL ENCOUNTER (OUTPATIENT)
Dept: RADIATION ONCOLOGY | Facility: HOSPITAL | Age: 56
Setting detail: RADIATION/ONCOLOGY SERIES
Discharge: HOME | End: 2021-01-21

## 2021-01-21 VITALS
DIASTOLIC BLOOD PRESSURE: 79 MMHG | SYSTOLIC BLOOD PRESSURE: 138 MMHG | BODY MASS INDEX: 42.58 KG/M2 | HEART RATE: 76 BPM | OXYGEN SATURATION: 96 % | TEMPERATURE: 97.5 F | WEIGHT: 263.7 LBS | RESPIRATION RATE: 18 BRPM

## 2021-01-21 DIAGNOSIS — C54.1 ENDOMETRIAL CANCER (HCC): ICD-10-CM

## 2021-01-21 PROCEDURE — G0463 HOSPITAL OUTPT CLINIC VISIT: HCPCS | Performed by: RADIOLOGY

## 2021-01-21 NOTE — PROGRESS NOTES
FOLLOW UP NOTE    PATIENT:                                                      Tiarra Becerra  MEDICAL RECORD #:                        [id number]  :                                                          1965  COMPLETION DATE:    2019  DIAGNOSIS:     Endometrial cancer (CMS/HCC)  - FIGO Stage I (cT1, cN0, cM0)  - FIGO Stage IB (pT1b, pN0, cM0)      BRIEF HISTORY:  Ms. 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  She was treated with a hysterectomy followed by adjuvant vaginal brachytherapy alone.  She completed treatments om 2019.  She has done well and has no signs of recurrent disease during this timeframe.  From a symptomatic standpoint, she denies bowel or bladder symptoms, and specifically denies vaginal bleeding or discharge.    MEDICATIONS: Medication reconciliation for the patient was reviewed and confirmed in the electronic medical record.    Review of Systems   HENT:   Positive for tinnitus.    All other systems reviewed and are negative.      KPS 80%    Physical Exam  Vitals signs and nursing note reviewed.   Constitutional:       General: She is not in acute distress.     Appearance: She is well-developed.   HENT:      Head: Normocephalic and atraumatic.   Eyes:      Conjunctiva/sclera: Conjunctivae normal.      Pupils: Pupils are equal, round, and reactive to light.   Neck:      Musculoskeletal: Normal range of motion and neck supple.   Cardiovascular:      Rate and Rhythm: Normal rate and regular rhythm.      Heart sounds: No murmur. No friction rub.   Pulmonary:      Effort: Pulmonary effort is normal.      Breath sounds: Normal breath sounds. No wheezing.   Abdominal:      General: Bowel sounds are normal. There is no distension.      Palpations: Abdomen is soft. There is no mass.      Tenderness: There is no abdominal tenderness.   Genitourinary:     Comments: Well-healed vaginal cuff without any focal  nodularity or masses.  Remainder the vaginal vault is normal.  Musculoskeletal: Normal range of motion.   Lymphadenopathy:      Cervical: No cervical adenopathy.   Skin:     General: Skin is warm and dry.   Neurological:      Mental Status: She is alert and oriented to person, place, and time.   Psychiatric:         Behavior: Behavior normal.         Thought Content: Thought content normal.         Judgment: Judgment normal.         VITAL SIGNS:   Vitals:    01/21/21 1435   BP: 138/79   Pulse: 76   Resp: 18   Temp: 97.5 °F (36.4 °C)   TempSrc: Temporal   SpO2: 96%   Weight: 120 kg (263 lb 11.2 oz)   PainSc: 0-No pain       The following portions of the patient's history were reviewed and updated as appropriate: allergies, current medications, past family history, past medical history, past social history, past surgical history and problem list.         Diagnoses and all orders for this visit:    1. Endometrial cancer (CMS/[facility])         IMPRESSION:  Ms. Lai is a 55-year-old female with a history of an FIGO stage Ib grade 3 endometrioid adenocarcinoma the uterus.  She is nearly 2 years out from completion of adjuvant vaginal brachytherapy and appears to be doing very well without any evidence of recurrent disease.  She is already scheduled to have ongoing follow-up in the GYN oncology clinic, and at this point I again discussed with her the long-term survivorship model with the timeframe for repeat physical exams.  She can follow-up in our department on an as-needed basis, and her further gynecologic care can be completed through the survivorship clinic with Nanette Gandhi.    RECOMMENDATIONS:    Return to clinic as needed.  Follow-up per the Gyn Oncology Clinic.    Brennan Vincent MD

## 2021-03-01 ENCOUNTER — OFFICE VISIT (OUTPATIENT)
Dept: INTERNAL MEDICINE | Facility: CLINIC | Age: 56
End: 2021-03-01

## 2021-03-01 ENCOUNTER — LAB (OUTPATIENT)
Dept: LAB | Facility: HOSPITAL | Age: 56
End: 2021-03-01

## 2021-03-01 VITALS
HEIGHT: 66 IN | TEMPERATURE: 96.9 F | SYSTOLIC BLOOD PRESSURE: 138 MMHG | HEART RATE: 67 BPM | DIASTOLIC BLOOD PRESSURE: 82 MMHG | WEIGHT: 267.8 LBS | OXYGEN SATURATION: 97 % | BODY MASS INDEX: 43.04 KG/M2

## 2021-03-01 DIAGNOSIS — E78.1 HIGH BLOOD TRIGLYCERIDES: ICD-10-CM

## 2021-03-01 DIAGNOSIS — I10 ESSENTIAL HYPERTENSION: ICD-10-CM

## 2021-03-01 DIAGNOSIS — I10 ESSENTIAL HYPERTENSION: Primary | ICD-10-CM

## 2021-03-01 DIAGNOSIS — E66.01 CLASS 3 SEVERE OBESITY WITH SERIOUS COMORBIDITY AND BODY MASS INDEX (BMI) OF 40.0 TO 44.9 IN ADULT, UNSPECIFIED OBESITY TYPE (HCC): ICD-10-CM

## 2021-03-01 LAB
ALBUMIN SERPL-MCNC: 4.3 G/DL (ref 3.5–5.2)
ALBUMIN/GLOB SERPL: 1.7 G/DL
ALP SERPL-CCNC: 113 U/L (ref 39–117)
ALT SERPL W P-5'-P-CCNC: 62 U/L (ref 1–33)
ANION GAP SERPL CALCULATED.3IONS-SCNC: 11.5 MMOL/L (ref 5–15)
AST SERPL-CCNC: 37 U/L (ref 1–32)
BASOPHILS # BLD AUTO: 0.04 10*3/MM3 (ref 0–0.2)
BASOPHILS NFR BLD AUTO: 0.8 % (ref 0–1.5)
BILIRUB SERPL-MCNC: 0.4 MG/DL (ref 0–1.2)
BUN SERPL-MCNC: 16 MG/DL (ref 6–20)
BUN/CREAT SERPL: 21.9 (ref 7–25)
CALCIUM SPEC-SCNC: 9.3 MG/DL (ref 8.6–10.5)
CHLORIDE SERPL-SCNC: 100 MMOL/L (ref 98–107)
CHOLEST SERPL-MCNC: 204 MG/DL (ref 0–200)
CO2 SERPL-SCNC: 27.5 MMOL/L (ref 22–29)
CREAT SERPL-MCNC: 0.73 MG/DL (ref 0.57–1)
DEPRECATED RDW RBC AUTO: 40.6 FL (ref 37–54)
EOSINOPHIL # BLD AUTO: 0.12 10*3/MM3 (ref 0–0.4)
EOSINOPHIL NFR BLD AUTO: 2.5 % (ref 0.3–6.2)
ERYTHROCYTE [DISTWIDTH] IN BLOOD BY AUTOMATED COUNT: 13.4 % (ref 12.3–15.4)
GFR SERPL CREATININE-BSD FRML MDRD: 82 ML/MIN/1.73
GLOBULIN UR ELPH-MCNC: 2.6 GM/DL
GLUCOSE SERPL-MCNC: 99 MG/DL (ref 65–99)
HCT VFR BLD AUTO: 40.7 % (ref 34–46.6)
HDLC SERPL-MCNC: 59 MG/DL (ref 40–60)
HGB BLD-MCNC: 13.6 G/DL (ref 12–15.9)
IMM GRANULOCYTES # BLD AUTO: 0.02 10*3/MM3 (ref 0–0.05)
IMM GRANULOCYTES NFR BLD AUTO: 0.4 % (ref 0–0.5)
LDLC SERPL CALC-MCNC: 112 MG/DL (ref 0–100)
LDLC/HDLC SERPL: 1.82 {RATIO}
LYMPHOCYTES # BLD AUTO: 0.7 10*3/MM3 (ref 0.7–3.1)
LYMPHOCYTES NFR BLD AUTO: 14.4 % (ref 19.6–45.3)
MCH RBC QN AUTO: 28 PG (ref 26.6–33)
MCHC RBC AUTO-ENTMCNC: 33.4 G/DL (ref 31.5–35.7)
MCV RBC AUTO: 83.7 FL (ref 79–97)
MONOCYTES # BLD AUTO: 0.27 10*3/MM3 (ref 0.1–0.9)
MONOCYTES NFR BLD AUTO: 5.5 % (ref 5–12)
NEUTROPHILS NFR BLD AUTO: 3.72 10*3/MM3 (ref 1.7–7)
NEUTROPHILS NFR BLD AUTO: 76.4 % (ref 42.7–76)
NRBC BLD AUTO-RTO: 0 /100 WBC (ref 0–0.2)
PLATELET # BLD AUTO: 177 10*3/MM3 (ref 140–450)
PMV BLD AUTO: 10.3 FL (ref 6–12)
POTASSIUM SERPL-SCNC: 4.2 MMOL/L (ref 3.5–5.2)
PROT SERPL-MCNC: 6.9 G/DL (ref 6–8.5)
RBC # BLD AUTO: 4.86 10*6/MM3 (ref 3.77–5.28)
SODIUM SERPL-SCNC: 139 MMOL/L (ref 136–145)
TRIGL SERPL-MCNC: 188 MG/DL (ref 0–150)
TSH SERPL DL<=0.05 MIU/L-ACNC: 2.09 UIU/ML (ref 0.27–4.2)
VLDLC SERPL-MCNC: 33 MG/DL (ref 5–40)
WBC # BLD AUTO: 4.87 10*3/MM3 (ref 3.4–10.8)

## 2021-03-01 PROCEDURE — 84443 ASSAY THYROID STIM HORMONE: CPT | Performed by: FAMILY MEDICINE

## 2021-03-01 PROCEDURE — 99213 OFFICE O/P EST LOW 20 MIN: CPT | Performed by: FAMILY MEDICINE

## 2021-03-01 PROCEDURE — 80061 LIPID PANEL: CPT | Performed by: FAMILY MEDICINE

## 2021-03-01 PROCEDURE — 85025 COMPLETE CBC W/AUTO DIFF WBC: CPT | Performed by: FAMILY MEDICINE

## 2021-03-01 PROCEDURE — 80053 COMPREHEN METABOLIC PANEL: CPT | Performed by: FAMILY MEDICINE

## 2021-03-01 RX ORDER — METOPROLOL SUCCINATE 50 MG/1
50 TABLET, EXTENDED RELEASE ORAL DAILY
Qty: 90 TABLET | Refills: 1 | Status: SHIPPED | OUTPATIENT
Start: 2021-03-01 | End: 2021-09-02 | Stop reason: SDUPTHER

## 2021-03-01 RX ORDER — LOSARTAN POTASSIUM 100 MG/1
100 TABLET ORAL DAILY
Qty: 90 TABLET | Refills: 1 | Status: SHIPPED | OUTPATIENT
Start: 2021-03-01 | End: 2021-09-02 | Stop reason: SDUPTHER

## 2021-03-01 NOTE — PROGRESS NOTES
"Subjective   Tiarra Becerra is a 56 y.o. female.     Chief Complaint   Patient presents with   • Hypertension     f/u       Visit Vitals  /82 (BP Location: Right arm, Patient Position: Sitting, Cuff Size: Large Adult)   Pulse 67   Temp 96.9 °F (36.1 °C) (Infrared)   Ht 167.6 cm (65.98\")   Wt 121 kg (267 lb 12.8 oz)   LMP 01/02/2018   SpO2 97%   BMI 43.25 kg/m²         Hypertension  This is a chronic problem. The current episode started more than 1 year ago. The problem is unchanged. The problem is uncontrolled. Pertinent negatives include no anxiety, blurred vision, chest pain, headaches, malaise/fatigue, neck pain, palpitations, peripheral edema, PND, shortness of breath or sweats. There are no associated agents to hypertension. Risk factors for coronary artery disease include dyslipidemia, family history, obesity and post-menopausal state. Past treatments include angiotensin blockers and beta blockers. Current antihypertension treatment includes angiotensin blockers and beta blockers. The current treatment provides moderate improvement. There are no compliance problems.  There is no history of angina, kidney disease, CAD/MI, CVA, heart failure, left ventricular hypertrophy, PVD or retinopathy. There is no history of sleep apnea or a thyroid problem.      Pt will get her first Covid vaccine on Friday.     Pt has had elevated triglycerides in the past.   Pt was released from radiation therapy in January.     The following portions of the patient's history were reviewed and updated as appropriate: allergies, current medications, past family history, past medical history, past social history, past surgical history and problem list.    Past Medical History:   Diagnosis Date   • Endometrial cancer (CMS/HCC) 11/28/2018   • Essential hypertension 8/6/2018   • Hx of radiation therapy       Past Surgical History:   Procedure Laterality Date   • D&C HYSTEROSCOPY MYOSURE  11/05/2018   • OOPHORECTOMY     • TOTAL " LAPAROSCOPIC HYSTERECTOMY WITH DAVINCI ROBOT  2018    laproscopic with BSO, lymph node dissection, left parametrectomy, lysis ureters, Dr Jaffe. Cancer   • TOTAL LAPAROSCOPIC HYSTERECTOMY, LAPAROSCOPIC NODE DISSECTION N/A 2018    Procedure: TOTAL LAPAROSCOPIC HYSTERECTOMY WITH DAVINCI ROBOT, BILATERAL SALPINGO OOPHORECTOMY, WITH RADICAL LEFT PARAMETRECTOMY, BILATERAL UTERETAL LYSIS, PELVIC LYMPH NODE DISSECTION;  Surgeon: April Jaffe MD;  Location: [facility];  Service: DaVinci   • TUBAL ABDOMINAL LIGATION        Family History   Problem Relation Age of Onset   • Breast cancer Cousin 55        50's   • Arthritis Mother    • Heart attack Mother    • Cancer Father         prostate   • Heart attack Father    • Hypertension Father    • Stroke Father    • Diabetes Sister         type 1   • Diabetes Brother         type 1   • Stroke Paternal Grandmother    • Ovarian cancer Neg Hx       Social History     Socioeconomic History   • Marital status:      Spouse name: Not on file   • Number of children: Not on file   • Years of education: Not on file   • Highest education level: Not on file   Tobacco Use   • Smoking status: Former Smoker     Packs/day: 0.75     Years: 12.00     Pack years: 9.00     Types: Cigarettes     Quit date:      Years since quittin.1   • Smokeless tobacco: Never Used   Substance and Sexual Activity   • Alcohol use: No   • Drug use: No   • Sexual activity: Yes     Partners: Male      Allergies   Allergen Reactions   • Lisinopril Cough       Review of Systems   Constitutional: Negative.  Negative for chills, diaphoresis, fatigue, fever and malaise/fatigue.   HENT: Positive for rhinorrhea. Negative for ear pain, nosebleeds, postnasal drip, sinus pressure, sneezing and sore throat.    Eyes: Negative.  Negative for blurred vision, redness and itching.   Respiratory: Negative.  Negative for cough, shortness of breath and wheezing.    Cardiovascular: Negative.  Negative  for chest pain, palpitations, leg swelling and PND.   Gastrointestinal: Negative.  Negative for abdominal pain, constipation, diarrhea, nausea and vomiting.   Endocrine: Negative.  Negative for cold intolerance and heat intolerance.   Genitourinary: Positive for frequency. Negative for dysuria, hematuria and urgency.   Musculoskeletal: Negative.  Negative for arthralgias, back pain and neck pain.   Skin: Negative.  Negative for color change and rash.   Allergic/Immunologic: Positive for environmental allergies.   Neurological: Negative.  Negative for dizziness, syncope, light-headedness and headaches.   Hematological: Negative.  Negative for adenopathy. Does not bruise/bleed easily.   Psychiatric/Behavioral: Negative.  Negative for dysphoric mood. The patient is not nervous/anxious.        Objective   Physical Exam  Vitals signs and nursing note reviewed.   Constitutional:       Appearance: She is well-developed.   HENT:      Head: Normocephalic.      Right Ear: External ear normal.      Left Ear: External ear normal.      Nose: Nose normal.   Eyes:      General: Lids are normal.      Conjunctiva/sclera: Conjunctivae normal.      Pupils: Pupils are equal, round, and reactive to light.   Neck:      Musculoskeletal: Normal range of motion and neck supple.      Thyroid: No thyroid mass or thyromegaly.      Vascular: No carotid bruit.      Trachea: Trachea normal.   Cardiovascular:      Rate and Rhythm: Normal rate and regular rhythm.      Heart sounds: No murmur.   Pulmonary:      Effort: Pulmonary effort is normal. No respiratory distress.      Breath sounds: Normal breath sounds. No decreased breath sounds, wheezing, rhonchi or rales.   Chest:      Chest wall: No tenderness.   Abdominal:      General: Bowel sounds are normal.      Palpations: Abdomen is soft.      Tenderness: There is no abdominal tenderness.   Musculoskeletal: Normal range of motion.   Skin:     General: Skin is warm and dry.   Neurological:       Mental Status: She is alert and oriented to person, place, and time.   Psychiatric:         Behavior: Behavior normal.         Assessment/Plan   Diagnoses and all orders for this visit:    1. Essential hypertension (Primary)  -     losartan (COZAAR) 100 MG tablet; Take 1 tablet by mouth Daily.  Dispense: 90 tablet; Refill: 1  -     metoprolol succinate XL (Toprol XL) 50 MG 24 hr tablet; Take 1 tablet by mouth Daily.  Dispense: 90 tablet; Refill: 1  -     Comprehensive Metabolic Panel; Future  -     TSH Rfx On Abnormal To Free T4; Future  -     CBC & Differential; Future  -     Lipid Panel; Future    2. High blood triglycerides  -     Lipid Panel; Future    3. Class 3 severe obesity with serious comorbidity and body mass index (BMI) of 40.0 to 44.9 in adult, unspecified obesity type (CMS/HCC)        Please follow a low animal fat diet that is also low in sugar, low in junk food, low in sweet drinks and low in alcohol.  Please increase the amount of fiber in your diet as well as increasing your daily exercise, such as walking.    Increase metoprolol XL from 25 mg to 50 mg per day           Current Outpatient Medications:   •  cetirizine (ZyrTEC) 10 MG tablet, Take 10 mg by mouth Every Other Day., Disp: , Rfl:   •  Cholecalciferol (VITAMIN D-3 PO), Take 2,000 Units by mouth Daily., Disp: , Rfl:   •  losartan (COZAAR) 100 MG tablet, Take 1 tablet by mouth Daily., Disp: 90 tablet, Rfl: 1  •  metoprolol succinate XL (Toprol XL) 50 MG 24 hr tablet, Take 1 tablet by mouth Daily., Disp: 90 tablet, Rfl: 1    Return in about 3 months (around 6/1/2021), or if symptoms worsen or fail to improve, for Annual, Recheck BP with nurse in 2 weeks.

## 2021-03-03 ENCOUNTER — TELEPHONE (OUTPATIENT)
Dept: INTERNAL MEDICINE | Facility: CLINIC | Age: 56
End: 2021-03-03

## 2021-03-03 NOTE — TELEPHONE ENCOUNTER
[facility] at [phone] for the patient to return our call, office number was provided      SHERRON jones to provider below provider results message. Thank you!     ----- Message from Fallon RAY MD sent at 3/1/2021  5:35 PM EST -----  Triglycerides and cholesterol elevated.  Low animal fat, low sugar, low alcohol diet.  Need to consider adding statins.  ALT and AST liver tests are elevated.  Patient's had fatty liver in the past.

## 2021-06-02 ENCOUNTER — OFFICE VISIT (OUTPATIENT)
Dept: INTERNAL MEDICINE | Facility: CLINIC | Age: 56
End: 2021-06-02

## 2021-06-02 VITALS
SYSTOLIC BLOOD PRESSURE: 110 MMHG | HEIGHT: 66 IN | DIASTOLIC BLOOD PRESSURE: 70 MMHG | TEMPERATURE: 96.9 F | WEIGHT: 268 LBS | BODY MASS INDEX: 43.07 KG/M2 | HEART RATE: 86 BPM | OXYGEN SATURATION: 98 %

## 2021-06-02 DIAGNOSIS — Z00.00 ANNUAL PHYSICAL EXAM: Primary | ICD-10-CM

## 2021-06-02 DIAGNOSIS — E66.01 CLASS 3 SEVERE OBESITY WITH SERIOUS COMORBIDITY AND BODY MASS INDEX (BMI) OF 40.0 TO 44.9 IN ADULT, UNSPECIFIED OBESITY TYPE (HCC): ICD-10-CM

## 2021-06-02 PROCEDURE — 99396 PREV VISIT EST AGE 40-64: CPT | Performed by: FAMILY MEDICINE

## 2021-06-02 NOTE — PROGRESS NOTES
"Subjective   Tiarra Becerra is a 56 y.o. female.     Chief Complaint   Patient presents with   • Annual Exam     sees gyno   • Hypertension       Visit Vitals  /70 (BP Location: Right arm, Patient Position: Sitting, Cuff Size: Large Adult)   Pulse 86   Temp 96.9 °F (36.1 °C) (Infrared)   Ht 167.6 cm (66\")   Wt 122 kg (268 lb)   LMP 01/02/2018   SpO2 98%   BMI 43.26 kg/m²         History of Present Illness   Pt here for annual exam.   Pt's BP is stable on current medication.     The following portions of the patient's history were reviewed and updated as appropriate: allergies, current medications, past family history, past medical history, past social history, past surgical history and problem list.    Past Medical History:   Diagnosis Date   • Endometrial cancer (CMS/HCC) 11/28/2018   • Essential hypertension 8/6/2018   • Hx of radiation therapy       Past Surgical History:   Procedure Laterality Date   • D & C HYSTEROSCOPY MYOSURE  11/05/2018   • OOPHORECTOMY     • TOTAL LAPAROSCOPIC HYSTERECTOMY WITH DAVINCI ROBOT  11/30/2018    laproscopic with BSO, lymph node dissection, left parametrectomy, lysis ureters, Dr Jaffe. Cancer   • TOTAL LAPAROSCOPIC HYSTERECTOMY, LAPAROSCOPIC NODE DISSECTION N/A 11/30/2018    Procedure: TOTAL LAPAROSCOPIC HYSTERECTOMY WITH DAVINCI ROBOT, BILATERAL SALPINGO OOPHORECTOMY, WITH RADICAL LEFT PARAMETRECTOMY, BILATERAL UTERETAL LYSIS, PELVIC LYMPH NODE DISSECTION;  Surgeon: April Jaffe MD;  Location: [facility];  Service: DaVinci   • TUBAL ABDOMINAL LIGATION  2006      Family History   Problem Relation Age of Onset   • Breast cancer Cousin 55        50's   • Arthritis Mother    • Heart attack Mother    • Cancer Father         prostate   • Heart attack Father    • Hypertension Father    • Stroke Father    • Diabetes Sister         type 1   • Diabetes Brother         type 1   • Stroke Paternal Grandmother    • Ovarian cancer Neg Hx       Social History     Socioeconomic " History   • Marital status:      Spouse name: Not on file   • Number of children: Not on file   • Years of education: Not on file   • Highest education level: Not on file   Tobacco Use   • Smoking status: Former Smoker     Packs/day: 0.75     Years: 12.00     Pack years: 9.00     Types: Cigarettes     Quit date:      Years since quittin.4   • Smokeless tobacco: Never Used   Substance and Sexual Activity   • Alcohol use: No   • Drug use: No   • Sexual activity: Yes     Partners: Male      Allergies   Allergen Reactions   • Lisinopril Cough       Review of Systems   Constitutional: Negative.  Negative for activity change, appetite change, chills, diaphoresis, fatigue, fever and unexpected weight change.   HENT: Positive for rhinorrhea and tinnitus. Negative for congestion, dental problem, drooling, ear discharge, ear pain, facial swelling, hearing loss, mouth sores, nosebleeds, postnasal drip, sinus pressure, sinus pain, sneezing, sore throat, trouble swallowing and voice change.    Eyes: Negative.  Negative for photophobia, pain, discharge, redness, itching and visual disturbance.   Respiratory: Negative.  Negative for apnea, cough, choking, chest tightness, shortness of breath, wheezing and stridor.    Cardiovascular: Negative.  Negative for chest pain, palpitations and leg swelling.   Gastrointestinal: Negative.  Negative for abdominal distention, abdominal pain, anal bleeding, blood in stool, constipation, diarrhea, nausea, rectal pain and vomiting.   Endocrine: Negative.  Negative for cold intolerance, heat intolerance, polydipsia, polyphagia and polyuria.   Genitourinary: Negative.  Negative for decreased urine volume, difficulty urinating, dyspareunia, dysuria, enuresis, flank pain, frequency, genital sores, hematuria, menstrual problem, pelvic pain, urgency, vaginal bleeding, vaginal discharge and vaginal pain.   Musculoskeletal: Positive for arthralgias and gait problem. Negative for back  pain, joint swelling, myalgias, neck pain and neck stiffness.   Skin: Negative.  Negative for color change, pallor, rash and wound.   Allergic/Immunologic: Negative.  Negative for environmental allergies, food allergies and immunocompromised state.   Neurological: Negative for dizziness, tremors, seizures, syncope, facial asymmetry, speech difficulty, weakness, light-headedness, numbness and headaches.   Hematological: Negative.  Negative for adenopathy. Does not bruise/bleed easily.   Psychiatric/Behavioral: Negative.  Negative for agitation, behavioral problems, confusion, decreased concentration, dysphoric mood, hallucinations, self-injury, sleep disturbance and suicidal ideas. The patient is not nervous/anxious and is not hyperactive.      PHQ-2 Depression Screening  Little interest or pleasure in doing things? 1   Feeling down, depressed, or hopeless? 0   PHQ-2 Total Score 1         Objective   Physical Exam  Vitals and nursing note reviewed.   Constitutional:       General: She is not in acute distress.     Appearance: She is well-developed. She is not diaphoretic.   HENT:      Head: Normocephalic and atraumatic.      Right Ear: Tympanic membrane, ear canal and external ear normal.      Left Ear: Tympanic membrane, ear canal and external ear normal.      Nose: Nose normal.      Mouth/Throat:      Lips: Pink.      Mouth: Mucous membranes are moist. Mucous membranes are not pale, not dry and not cyanotic.      Dentition: Normal dentition.      Tongue: No lesions.      Palate: No mass.      Pharynx: Uvula midline. No pharyngeal swelling, oropharyngeal exudate, posterior oropharyngeal erythema or uvula swelling.   Eyes:      General: Lids are normal. No scleral icterus.        Right eye: No foreign body, discharge or hordeolum.         Left eye: No foreign body, discharge or hordeolum.      Extraocular Movements:      Right eye: Normal extraocular motion and no nystagmus.      Left eye: Normal extraocular motion  and no nystagmus.      Conjunctiva/sclera: Conjunctivae normal.      Right eye: Right conjunctiva is not injected. No chemosis, exudate or hemorrhage.     Left eye: Left conjunctiva is not injected. No chemosis, exudate or hemorrhage.     Pupils: Pupils are equal, round, and reactive to light.   Neck:      Thyroid: No thyroid mass or thyromegaly.      Vascular: No carotid bruit.      Trachea: Trachea normal. No tracheal deviation.   Cardiovascular:      Rate and Rhythm: Normal rate and regular rhythm.      Pulses:           Dorsalis pedis pulses are 2+ on the right side and 2+ on the left side.        Posterior tibial pulses are 2+ on the right side and 2+ on the left side.      Heart sounds: Normal heart sounds. No murmur heard.   No friction rub. No gallop.    Pulmonary:      Effort: Pulmonary effort is normal. No respiratory distress.      Breath sounds: Normal breath sounds. No decreased breath sounds, wheezing, rhonchi or rales.   Chest:      Chest wall: No tenderness. There is no dullness to percussion.      Breasts: Pritesh Score is 5.         Right: Normal. No swelling, bleeding, inverted nipple, mass, nipple discharge, skin change or tenderness.         Left: Normal. No swelling, bleeding, inverted nipple, mass, nipple discharge, skin change or tenderness.   Abdominal:      General: Bowel sounds are normal. There is no distension.      Palpations: Abdomen is soft. There is no hepatomegaly, splenomegaly or mass.      Tenderness: There is no abdominal tenderness. There is no guarding or rebound.      Hernia: No hernia is present.       Musculoskeletal:         General: No tenderness or deformity. Normal range of motion.      Cervical back: Normal range of motion and neck supple.   Lymphadenopathy:      Head:      Right side of head: No submandibular adenopathy.      Left side of head: No submandibular adenopathy.      Cervical: No cervical adenopathy.      Right cervical: No superficial, deep or posterior  cervical adenopathy.     Left cervical: No superficial, deep or posterior cervical adenopathy.      Upper Body:      Right upper body: No supraclavicular, axillary or pectoral adenopathy.      Left upper body: No supraclavicular, axillary or pectoral adenopathy.   Skin:     General: Skin is warm and dry.      Findings: No rash.      Nails: There is no clubbing.   Neurological:      Mental Status: She is alert and oriented to person, place, and time.      Cranial Nerves: No cranial nerve deficit.      Sensory: No sensory deficit.      Coordination: Coordination normal.      Deep Tendon Reflexes: Reflexes are normal and symmetric.      Reflex Scores:       Bicep reflexes are 2+ on the right side and 2+ on the left side.       Brachioradialis reflexes are 2+ on the right side and 2+ on the left side.       Patellar reflexes are 2+ on the right side and 2+ on the left side.  Psychiatric:         Attention and Perception: Attention normal.         Mood and Affect: Mood and affect normal.         Speech: Speech normal.         Behavior: Behavior normal.         Thought Content: Thought content normal.         Cognition and Memory: Cognition and memory normal.         Judgment: Judgment normal.         Assessment/Plan   Diagnoses and all orders for this visit:    1. Annual physical exam (Primary)    2. Class 3 severe obesity with serious comorbidity and body mass index (BMI) of 40.0 to 44.9 in adult, unspecified obesity type (CMS/HCC)          Please follow a low animal fat diet that is also low in sugar, low in junk food, low in sweet drinks and low in alcohol.  Please increase the amount of fiber in your diet as well as increasing your daily exercise, such as walking.       Handout on mediterranean diet and calorie counting    Current Outpatient Medications:   •  BIOTIN PO, Take  by mouth., Disp: , Rfl:   •  cetirizine (ZyrTEC) 10 MG tablet, Take 10 mg by mouth Every Other Day., Disp: , Rfl:   •  Cholecalciferol (VITAMIN  D-3 PO), Take 2,000 Units by mouth Daily., Disp: , Rfl:   •  Glucosamine-Chondroitin-MSM (CONDROLITE PO), Take  by mouth., Disp: , Rfl:   •  losartan (COZAAR) 100 MG tablet, Take 1 tablet by mouth Daily., Disp: 90 tablet, Rfl: 1  •  metoprolol succinate XL (Toprol XL) 50 MG 24 hr tablet, Take 1 tablet by mouth Daily., Disp: 90 tablet, Rfl: 1    Return in about 3 months (around 9/2/2021), or if symptoms worsen or fail to improve, for Recheck lipid and BP.     Recent Results (from the past 2688 hour(s))   Comprehensive Metabolic Panel    Collection Time: 03/01/21  8:57 AM    Specimen: Blood   Result Value Ref Range    Glucose 99 65 - 99 mg/dL    BUN 16 6 - 20 mg/dL    Creatinine 0.73 0.57 - 1.00 mg/dL    Sodium 139 136 - 145 mmol/L    Potassium 4.2 3.5 - 5.2 mmol/L    Chloride 100 98 - 107 mmol/L    CO2 27.5 22.0 - 29.0 mmol/L    Calcium 9.3 8.6 - 10.5 mg/dL    Total Protein 6.9 6.0 - 8.5 g/dL    Albumin 4.30 3.50 - 5.20 g/dL    ALT (SGPT) 62 (H) 1 - 33 U/L    AST (SGOT) 37 (H) 1 - 32 U/L    Alkaline Phosphatase 113 39 - 117 U/L    Total Bilirubin 0.4 0.0 - 1.2 mg/dL    eGFR Non African Amer 82 >60 mL/min/1.73    Globulin 2.6 gm/dL    A/G Ratio 1.7 g/dL    BUN/Creatinine Ratio 21.9 7.0 - 25.0    Anion Gap 11.5 5.0 - 15.0 mmol/L   TSH Rfx On Abnormal To Free T4    Collection Time: 03/01/21  8:57 AM    Specimen: Blood   Result Value Ref Range    TSH 2.090 0.270 - 4.200 uIU/mL   Lipid Panel    Collection Time: 03/01/21  8:57 AM    Specimen: Blood   Result Value Ref Range    Total Cholesterol 204 (H) 0 - 200 mg/dL    Triglycerides 188 (H) 0 - 150 mg/dL    HDL Cholesterol 59 40 - 60 mg/dL    LDL Cholesterol  112 (H) 0 - 100 mg/dL    VLDL Cholesterol 33 5 - 40 mg/dL    LDL/HDL Ratio 1.82    CBC Auto Differential    Collection Time: 03/01/21  8:57 AM    Specimen: Blood   Result Value Ref Range    WBC 4.87 3.40 - 10.80 10*3/mm3    RBC 4.86 3.77 - 5.28 10*6/mm3    Hemoglobin 13.6 12.0 - 15.9 g/dL    Hematocrit 40.7 34.0 - 46.6  %    MCV 83.7 79.0 - 97.0 fL    MCH 28.0 26.6 - 33.0 pg    MCHC 33.4 31.5 - 35.7 g/dL    RDW 13.4 12.3 - 15.4 %    RDW-SD 40.6 37.0 - 54.0 fl    MPV 10.3 6.0 - 12.0 fL    Platelets 177 140 - 450 10*3/mm3    Neutrophil % 76.4 (H) 42.7 - 76.0 %    Lymphocyte % 14.4 (L) 19.6 - 45.3 %    Monocyte % 5.5 5.0 - 12.0 %    Eosinophil % 2.5 0.3 - 6.2 %    Basophil % 0.8 0.0 - 1.5 %    Immature Grans % 0.4 0.0 - 0.5 %    Neutrophils, Absolute 3.72 1.70 - 7.00 10*3/mm3    Lymphocytes, Absolute 0.70 0.70 - 3.10 10*3/mm3    Monocytes, Absolute 0.27 0.10 - 0.90 10*3/mm3    Eosinophils, Absolute 0.12 0.00 - 0.40 10*3/mm3    Basophils, Absolute 0.04 0.00 - 0.20 10*3/mm3    Immature Grans, Absolute 0.02 0.00 - 0.05 10*3/mm3    nRBC 0.0 0.0 - 0.2 /100 WBC

## 2021-06-02 NOTE — PATIENT INSTRUCTIONS
Calorie Counting for Weight Loss  Calories are units of energy. Your body needs a certain amount of calories from food to keep you going throughout the day. When you eat more calories than your body needs, your body stores the extra calories as fat. When you eat fewer calories than your body needs, your body burns fat to get the energy it needs.  Calorie counting means keeping track of how many calories you eat and drink each day. Calorie counting can be helpful if you need to lose weight. If you make sure to eat fewer calories than your body needs, you should lose weight. Ask your health care provider what a healthy weight is for you.  For calorie counting to work, you will need to eat the right number of calories in a day in order to lose a healthy amount of weight per week. A dietitian can help you determine how many calories you need in a day and will give you suggestions on how to reach your calorie goal.  · A healthy amount of weight to lose per week is usually 1-2 lb (0.5-0.9 kg). This usually means that your daily calorie intake should be reduced by 500-750 calories.  · Eating 1,200 - 1,500 calories per day can help most women lose weight.  · Eating 1,500 - 1,800 calories per day can help most men lose weight.  What is my plan?  My goal is to have __________ calories per day.  If I have this many calories per day, I should lose around __________ pounds per week.  What do I need to know about calorie counting?  In order to meet your daily calorie goal, you will need to:  · Find out how many calories are in each food you would like to eat. Try to do this before you eat.  · Decide how much of the food you plan to eat.  · Write down what you ate and how many calories it had. Doing this is called keeping a food log.  To successfully lose weight, it is important to balance calorie counting with a healthy lifestyle that includes regular activity. Aim for 150 minutes of moderate exercise (such as walking) or 75  minutes of vigorous exercise (such as running) each week.  Where do I find calorie information?    The number of calories in a food can be found on a Nutrition Facts label. If a food does not have a Nutrition Facts label, try to look up the calories online or ask your dietitian for help.  Remember that calories are listed per serving. If you choose to have more than one serving of a food, you will have to multiply the calories per serving by the amount of servings you plan to eat. For example, the label on a package of bread might say that a serving size is 1 slice and that there are 90 calories in a serving. If you eat 1 slice, you will have eaten 90 calories. If you eat 2 slices, you will have eaten 180 calories.  How do I keep a food log?  Immediately after each meal, record the following information in your food log:  · What you ate. Don't forget to include toppings, sauces, and other extras on the food.  · How much you ate. This can be measured in cups, ounces, or number of items.  · How many calories each food and drink had.  · The total number of calories in the meal.  Keep your food log near you, such as in a small notebook in your pocket, or use a mobile david or website. Some programs will calculate calories for you and show you how many calories you have left for the day to meet your goal.  What are some calorie counting tips?    · Use your calories on foods and drinks that will fill you up and not leave you hungry:  ? Some examples of foods that fill you up are nuts and nut butters, vegetables, lean proteins, and high-fiber foods like whole grains. High-fiber foods are foods with more than 5 g fiber per serving.  ? Drinks such as sodas, specialty coffee drinks, alcohol, and juices have a lot of calories, yet do not fill you up.  · Eat nutritious foods and avoid empty calories. Empty calories are calories you get from foods or beverages that do not have many vitamins or protein, such as candy, sweets, and  "soda. It is better to have a nutritious high-calorie food (such as an avocado) than a food with few nutrients (such as a bag of chips).  · Know how many calories are in the foods you eat most often. This will help you calculate calorie counts faster.  · Pay attention to calories in drinks. Low-calorie drinks include water and unsweetened drinks.  · Pay attention to nutrition labels for \"low fat\" or \"fat free\" foods. These foods sometimes have the same amount of calories or more calories than the full fat versions. They also often have added sugar, starch, or salt, to make up for flavor that was removed with the fat.  · Find a way of tracking calories that works for you. Get creative. Try different apps or programs if writing down calories does not work for you.  What are some portion control tips?  · Know how many calories are in a serving. This will help you know how many servings of a certain food you can have.  · Use a measuring cup to measure serving sizes. You could also try weighing out portions on a kitchen scale. With time, you will be able to estimate serving sizes for some foods.  · Take some time to put servings of different foods on your favorite plates, bowls, and cups so you know what a serving looks like.  · Try not to eat straight from a bag or box. Doing this can lead to overeating. Put the amount you would like to eat in a cup or on a plate to make sure you are eating the right portion.  · Use smaller plates, glasses, and bowls to prevent overeating.  · Try not to multitask (for example, watch TV or use your computer) while eating. If it is time to eat, sit down at a table and enjoy your food. This will help you to know when you are full. It will also help you to be aware of what you are eating and how much you are eating.  What are tips for following this plan?  Reading food labels  · Check the calorie count compared to the serving size. The serving size may be smaller than what you are used to " "eating.  · Check the source of the calories. Make sure the food you are eating is high in vitamins and protein and low in saturated and trans fats.  Shopping  · Read nutrition labels while you shop. This will help you make healthy decisions before you decide to purchase your food.  · Make a grocery list and stick to it.  Cooking  · Try to cook your favorite foods in a healthier way. For example, try baking instead of frying.  · Use low-fat dairy products.  Meal planning  · Use more fruits and vegetables. Half of your plate should be fruits and vegetables.  · Include lean proteins like poultry and fish.  How do I count calories when eating out?  · Ask for smaller portion sizes.  · Consider sharing an entree and sides instead of getting your own entree.  · If you get your own entree, eat only half. Ask for a box at the beginning of your meal and put the rest of your entree in it so you are not tempted to eat it.  · If calories are listed on the menu, choose the lower calorie options.  · Choose dishes that include vegetables, fruits, whole grains, low-fat dairy products, and lean protein.  · Choose items that are boiled, broiled, grilled, or steamed. Stay away from items that are buttered, battered, fried, or served with cream sauce. Items labeled \"crispy\" are usually fried, unless stated otherwise.  · Choose water, low-fat milk, unsweetened iced tea, or other drinks without added sugar. If you want an alcoholic beverage, choose a lower calorie option such as a glass of wine or light beer.  · Ask for dressings, sauces, and syrups on the side. These are usually high in calories, so you should limit the amount you eat.  · If you want a salad, choose a garden salad and ask for grilled meats. Avoid extra toppings like dean, cheese, or fried items. Ask for the dressing on the side, or ask for olive oil and vinegar or lemon to use as dressing.  · Estimate how many servings of a food you are given. For example, a serving of " cooked rice is ½ cup or about the size of half a baseball. Knowing serving sizes will help you be aware of how much food you are eating at restaurants. The list below tells you how big or small some common portion sizes are based on everyday objects:  ? 1 oz--4 stacked dice.  ? 3 oz--1 deck of cards.  ? 1 tsp--1 die.  ? 1 Tbsp--½ a ping-pong ball.  ? 2 Tbsp--1 ping-pong ball.  ? ½ cup--½ baseball.  ? 1 cup--1 baseball.  Summary  · Calorie counting means keeping track of how many calories you eat and drink each day. If you eat fewer calories than your body needs, you should lose weight.  · A healthy amount of weight to lose per week is usually 1-2 lb (0.5-0.9 kg). This usually means reducing your daily calorie intake by 500-750 calories.  · The number of calories in a food can be found on a Nutrition Facts label. If a food does not have a Nutrition Facts label, try to look up the calories online or ask your dietitian for help.  · Use your calories on foods and drinks that will fill you up, and not on foods and drinks that will leave you hungry.  · Use smaller plates, glasses, and bowls to prevent overeating.  This information is not intended to replace advice given to you by your health care provider. Make sure you discuss any questions you have with your health care provider.  Document Revised: 09/06/2019 Document Reviewed: 11/17/2017  Elsevier Patient Education © 2020 [employer]  Mediterranean Diet  A Mediterranean diet refers to food and lifestyle choices that are based on the traditions of countries located on the [place]. This way of eating has been shown to help prevent certain conditions and improve outcomes for people who have chronic diseases, like kidney disease and heart disease.  What are tips for following this plan?  Lifestyle  · Cook and eat meals together with your family, when possible.  · Drink enough fluid to keep your urine clear or pale yellow.  · Be physically active every day. This  includes:  ? Aerobic exercise like running or swimming.  ? Leisure activities like gardening, walking, or housework.  · Get 7-8 hours of sleep each night.  · If recommended by your health care provider, drink red wine in moderation. This means 1 glass a day for nonpregnant women and 2 glasses a day for men. A glass of wine equals 5 oz (150 mL).  Reading food labels    · Check the serving size of packaged foods. For foods such as rice and pasta, the serving size refers to the amount of cooked product, not dry.  · Check the total fat in packaged foods. Avoid foods that have saturated fat or trans fats.  · Check the ingredients list for added sugars, such as corn syrup.  Shopping  · At the grocery store, buy most of your food from the areas near the walls of the store. This includes:  ? Fresh fruits and vegetables (produce).  ? Grains, beans, nuts, and seeds. Some of these may be available in unpackaged forms or large amounts (in bulk).  ? Fresh seafood.  ? Poultry and eggs.  ? Low-fat dairy products.  · Buy whole ingredients instead of prepackaged foods.  · Buy fresh fruits and vegetables in-season from local [employer] markets.  · Buy frozen fruits and vegetables in resealable bags.  · If you do not have access to quality fresh seafood, buy precooked frozen shrimp or canned fish, such as tuna, salmon, or sardines.  · Buy small amounts of raw or cooked vegetables, salads, or olives from the deli or salad bar at your store.  · Stock your pantry so you always have certain foods on hand, such as olive oil, canned tuna, canned tomatoes, rice, pasta, and beans.  Cooking  · Cook foods with extra-virgin olive oil instead of using butter or other vegetable oils.  · Have meat as a side dish, and have vegetables or grains as your main dish. This means having meat in small portions or adding small amounts of meat to foods like pasta or stew.  · Use beans or vegetables instead of meat in common dishes like chili or  dori.  · [place] with different cooking methods. Try roasting or broiling vegetables instead of steaming or sautéeing them.  · Add frozen vegetables to soups, stews, pasta, or rice.  · Add nuts or seeds for added healthy fat at each meal. You can add these to yogurt, salads, or vegetable dishes.  · Marinate fish or vegetables using olive oil, lemon juice, garlic, and fresh herbs.  Meal planning    · Plan to eat 1 vegetarian meal one day each week. Try to work up to 2 vegetarian meals, if possible.  · Eat seafood 2 or more times a week.  · Have healthy snacks readily available, such as:  ? Vegetable sticks with hummus.  ? Greek yogurt.  ? Fruit and nut trail mix.  · Eat balanced meals throughout the week. This includes:  ? Fruit: 2-3 servings a day  ? Vegetables: 4-5 servings a day  ? Low-fat dairy: 2 servings a day  ? Fish, poultry, or lean meat: 1 serving a day  ? Beans and legumes: 2 or more servings a week  ? Nuts and seeds: 1-2 servings a day  ? Whole grains: 6-8 servings a day  ? Extra-virgin olive oil: 3-4 servings a day  · Limit red meat and sweets to only a few servings a month  What are my food choices?  · Mediterranean diet  ? Recommended  § Grains: Whole-grain pasta. Brown rice. Bulgar wheat. Polenta. Couscous. Whole-wheat bread. Oatmeal. Quinoa.  § Vegetables: Artichokes. Beets. Broccoli. Cabbage. Carrots. Eggplant. Green beans. Chard. Kale. Spinach. Onions. Leeks. Peas. Squash. Tomatoes. Peppers. Radishes.  § Fruits: Apples. Apricots. Avocado. Berries. Bananas. Cherries. Dates. Figs. Grapes. Charlie. Melon. Oranges. Peaches. Plums. Pomegranate.  § Meats and other protein foods: Beans. Almonds. Sunflower seeds. Pine nuts. Peanuts. Cod. [place]. Scallops. Shrimp. Tuna. Tilapia. Clams. Oysters. Eggs.  § Dairy: Low-fat milk. Cheese. Greek yogurt.  § Beverages: Water. Red wine. Herbal tea.  § Fats and oils: Extra virgin olive oil. Avocado oil. Grape seed oil.  § Sweets and desserts: Greek yogurt with  honey. Baked apples. Poached pears. Trail mix.  § Seasoning and other foods: Basil. Cilantro. Coriander. Cumin. Mint. Parsley. [place]. Rosemary. Tarragon. Garlic. Oregano. Thyme. Pepper. Balsalmic vinegar. Tahini. Hummus. Tomato sauce. Olives. Mushrooms.  ? Limit these  § Grains: Prepackaged pasta or rice dishes. Prepackaged cereal with added sugar.  § Vegetables: Deep fried potatoes (french fries).  § Fruits: Fruit canned in syrup.  § Meats and other protein foods: Beef. Pork. Lamb. Poultry with skin. Hot dogs. Solis.  § Dairy: Ice cream. Sour cream. Whole milk.  § Beverages: Juice. Sugar-sweetened soft drinks. Beer. Liquor and spirits.  § Fats and oils: Butter. Canola oil. Vegetable oil. Beef fat (tallow). Lard.  § Sweets and desserts: Cookies. Cakes. Pies. Candy.  § Seasoning and other foods: Mayonnaise. Premade sauces and marinades.  The items listed may not be a complete list. Talk with your dietitian about what dietary choices are right for you.  Summary  · The Mediterranean diet includes both food and lifestyle choices.  · Eat a variety of fresh fruits and vegetables, beans, nuts, seeds, and whole grains.  · Limit the amount of red meat and sweets that you eat.  · Talk with your health care provider about whether it is safe for you to drink red wine in moderation. This means 1 glass a day for nonpregnant women and 2 glasses a day for men. A glass of wine equals 5 oz (150 mL).  This information is not intended to replace advice given to you by your health care provider. Make sure you discuss any questions you have with your health care provider.  Document Revised: 08/17/2017 Document Reviewed: 08/10/2017  Elsevier Patient Education © 2020 [employer] Inc[employer]

## 2021-07-20 NOTE — PROGRESS NOTES
GYN ONCOLOGY CANCER SURVEILLANCE FOLLOW-UP    Tiarra Becerra  [id number]  1965    Chief Complaint: No chief complaint on file.      History of present illness:  Tiarra Becerra is a 56 y.o. year old female who is here today for ongoing surveillance of Stage IB grade 3 endometrioid adenocarcinoma, see Cancer History. She is now 2.5 years removed from completion of treatment. She was previously followed by Dr. Vincent with rad onc as well and has been released for PRN follow-up. She reports she is feeling very well today and has no complaints. She denies vaginal bleeding, dyspareunia, pelvic pain, and changes in bowel or bladder function. She does endorse mild loose stools since radiation therapy but reports that this is unchanged.    Health Maintenance:  - Colonoscopy: UTD, last was 11/2019 with plan for repeat in 5 years  - Clinical breast exam: does not have outside provider performing these; plan for breast exam at f/u in 6 months  - Mammogram: UTD, next due 9/2021  - DEXA: discussed briefly today, plan to start at age 64  - COVID vaccination: has completed vaccines       Cancer History:   Oncology/Hematology History   Endometrial cancer (CMS/HCC)   10/2018 Imaging    Patient presented to Dr. Davis for Annual exam with c/o new onset vaginal bleeding after amenorrhea x 8 months.   TVUS revealed large polyp vs fibroid in endocervical canal     11/5/2018 Procedure    ECC and Myosure by gynecologist. Pathology revealed grade 1 endometrioid adenocarcinoma in a background of complex atypical hyperplasia, suspicion for myoinvasion. Pt referred to Gyn Oncology     11/30/2018 Surgery    RTLH/BSO, radical left parametrectomy, left ureteral lysis, and pelvic lymph node dissection.   There was suspicion for concurrent cervical cancer at time of surgery, found to be multifocal grade 2-3 endometrioid carcinoma with extensive lower uterine segment involvement upon final pathology. Tumor invasive into over 50% of the  myometrium (11/14 mm). Nodes negative. MSI testing normal. Stage IB grade 2-3     12/26/2018 Imaging    Post-op CT abdomen and pelvis negative     1/15/2019 - 2/18/2019 Radiation    Radiation OncologyTreatment Course:  Tiarra Becerra received 4500 cGy in 25 fractions to pelvis via External Beam Radiation - EBRT.     2/26/2019 - 2/28/2019 Radiation    Radiation OncologyTreatment Course:  Tiarra Becerra received 1200 cGy in 2 fractions to vagina via High Dose Radiation - HDR.     6/12/2019 Imaging    CT chest, abdomen, pelvis negative     12/2/2019 Survivorship    Survivorship Care Plan completed and discussed with patient.  Copy of Survivorship Care Plan provided to patient and primary care provider.         Past Medical History:   Diagnosis Date   • Endometrial cancer (CMS/HCC) 11/28/2018   • Essential hypertension 8/6/2018   • Hx of radiation therapy        Past Surgical History:   Procedure Laterality Date   • D & C HYSTEROSCOPY MYOSURE  11/05/2018   • OOPHORECTOMY     • TOTAL LAPAROSCOPIC HYSTERECTOMY WITH DAVINCI ROBOT  11/30/2018    laproscopic with BSO, lymph node dissection, left parametrectomy, lysis ureters, Dr Jaffe. Cancer   • TOTAL LAPAROSCOPIC HYSTERECTOMY, LAPAROSCOPIC NODE DISSECTION N/A 11/30/2018    Procedure: TOTAL LAPAROSCOPIC HYSTERECTOMY WITH DAVINCI ROBOT, BILATERAL SALPINGO OOPHORECTOMY, WITH RADICAL LEFT PARAMETRECTOMY, BILATERAL UTERETAL LYSIS, PELVIC LYMPH NODE DISSECTION;  Surgeon: April Jaffe MD;  Location: [facility];  Service: DaVinci   • TUBAL ABDOMINAL LIGATION  2006       MEDICATIONS: The current medication list was reviewed and reconciled.     Allergies:  is allergic to lisinopril.    Family History   Problem Relation Age of Onset   • Breast cancer Cousin 55        50's   • Arthritis Mother    • Heart attack Mother    • Cancer Father         prostate   • Heart attack Father    • Hypertension Father    • Stroke Father    • Diabetes Sister         type 1   • Diabetes Brother       "   type 1   • Stroke Paternal Grandmother    • Ovarian cancer Neg Hx        Last imaging study was CT c/a/p 6/12/2019.     Review of Systems   Constitutional: Negative for appetite change, chills, fatigue, fever and unexpected weight change.   HENT: Positive for postnasal drip and rhinorrhea.         Seasonal allergies   Respiratory: Positive for cough (pt attributes to seasonal allergies). Negative for shortness of breath and wheezing.    Cardiovascular: Negative for chest pain, palpitations and leg swelling.   Gastrointestinal: Positive for diarrhea. Negative for abdominal distention, abdominal pain, blood in stool, constipation, nausea and vomiting.   Endocrine: Negative.    Genitourinary: Negative for dyspareunia, dysuria, frequency, genital sores, hematuria, pelvic pain, urgency, vaginal bleeding, vaginal discharge and vaginal pain.   Musculoskeletal: Negative for arthralgias, gait problem and joint swelling.   Skin: Negative.    Neurological: Negative for dizziness, seizures, syncope, weakness, light-headedness, numbness and headaches.   Hematological: Negative for adenopathy.   Psychiatric/Behavioral: Negative.      Vital Signs: /64   Pulse 60   Temp 96.8 °F (36 °C) (Temporal)   Resp 17   Ht 167.6 cm (65.98\")   Wt 120 kg (265 lb 1.6 oz)   LMP 01/02/2018   SpO2 98%   BMI 42.81 kg/m²   Vitals:    07/21/21 1000   PainSc: 0-No pain           General Appearance:  alert, cooperative, no apparent distress, appears stated age and obese   Neurologic/Psychiatric: A&O x 3, gait steady, appropriate affect   HEENT:  Normocephalic, without obvious abnormality, mucous membranes moist   Lungs:   Clear to auscultation bilaterally; respirations regular, even, and unlabored bilaterally   Heart:  Regular rate and rhythm, no murmurs appreciated   Breasts:  deferred   Abdomen:   Soft, non-tender, non-distended, no organomegaly and Exam limited d/t habitus.   Lymph nodes: No cervical, supraclavicular, inguinal " adenopathy noted   Extremities: Normal, atraumatic; no clubbing, cyanosis, or edema    Pelvic: External Genitalia  without lesions or skin changes  Vagina  is pink, moist, without lesions.   Vaginal Cuff  Female Vaginal Cuff: smooth, intact and without visible lesions  Uterus  surgically absent, no palpable masses and exam limited d/t habitus  Ovaries  without palpable masses or fullness  Parametria  smooth  Rectovaginal  Female rectovaginal: deferred     ECOG Performance Status: (0) Fully Active - Able to Carry On All Pre-disease Performance Without Restriction    Procedure Note:  No notes on file      Assessment and Plan:  Diagnoses and all orders for this visit:    1. Endometrial cancer (CMS/HCC) (Primary)      Tiarra Becerra is a 56 y.o. with Stage IB grade 3 endometrioid adenocarcinoma s/p surgery and radiation (completed February 2019).    There is no evidence of disease upon today's exam. Patient approaching 2.5 years since completion of treatment. She saw Dr. Vincent of radiation oncology in January 2021 and was released with plan for f/u PRN. We will see her 6 months after that time as long as she is doing well. She is understanding to call with any changes in pelvic symptoms or general GYN concerns at any time between regularly scheduled visits.     Plan for annual with [facility] at next visit.  Patient encouraged to discuss DEXA with PCP.     Pain assessment was performed today as a part of patient’s care.  For patients with pain related to surgery, gynecologic malignancy or cancer treatment, the plan is as noted in the assessment/plan.  For patients with pain not related to these issues, they are to seek any further needed care from a more appropriate provider, such as PCP.    Follow-up: 6 months for surveillance    Patient was seen and examined with Dr. Ramirez,  resident, who performed portions of the examination and documentation for this patient's care under my direct supervision.  I agree with the above  documentation and plan.    April Jaffe MD  07/21/21  10:31 EDT

## 2021-07-21 ENCOUNTER — OFFICE VISIT (OUTPATIENT)
Dept: GYNECOLOGIC ONCOLOGY | Facility: CLINIC | Age: 56
End: 2021-07-21

## 2021-07-21 VITALS
RESPIRATION RATE: 17 BRPM | WEIGHT: 265.1 LBS | OXYGEN SATURATION: 98 % | HEART RATE: 60 BPM | HEIGHT: 66 IN | TEMPERATURE: 96.8 F | BODY MASS INDEX: 42.61 KG/M2 | DIASTOLIC BLOOD PRESSURE: 64 MMHG | SYSTOLIC BLOOD PRESSURE: 118 MMHG

## 2021-07-21 DIAGNOSIS — C54.1 ENDOMETRIAL CANCER (HCC): Primary | ICD-10-CM

## 2021-07-21 PROCEDURE — 99213 OFFICE O/P EST LOW 20 MIN: CPT | Performed by: OBSTETRICS & GYNECOLOGY

## 2021-08-17 ENCOUNTER — TRANSCRIBE ORDERS (OUTPATIENT)
Dept: ADMINISTRATIVE | Facility: HOSPITAL | Age: 56
End: 2021-08-17

## 2021-08-17 DIAGNOSIS — Z12.31 VISIT FOR SCREENING MAMMOGRAM: Primary | ICD-10-CM

## 2021-08-26 ENCOUNTER — OFFICE VISIT (OUTPATIENT)
Dept: INTERNAL MEDICINE | Facility: CLINIC | Age: 56
End: 2021-08-26

## 2021-08-26 VITALS
SYSTOLIC BLOOD PRESSURE: 120 MMHG | HEART RATE: 66 BPM | OXYGEN SATURATION: 98 % | BODY MASS INDEX: 42.01 KG/M2 | WEIGHT: 261.4 LBS | TEMPERATURE: 96.9 F | RESPIRATION RATE: 16 BRPM | HEIGHT: 66 IN | DIASTOLIC BLOOD PRESSURE: 84 MMHG

## 2021-08-26 DIAGNOSIS — R30.9 PAINFUL URINATION: Primary | ICD-10-CM

## 2021-08-26 LAB
BILIRUB BLD-MCNC: NEGATIVE MG/DL
CLARITY, POC: ABNORMAL
COLOR UR: ABNORMAL
GLUCOSE UR STRIP-MCNC: NEGATIVE MG/DL
KETONES UR QL: NEGATIVE
LEUKOCYTE EST, POC: ABNORMAL
NITRITE UR-MCNC: NEGATIVE MG/ML
PH UR: 6 [PH] (ref 5–8)
PROT UR STRIP-MCNC: NEGATIVE MG/DL
RBC # UR STRIP: ABNORMAL /UL
SP GR UR: 1.01 (ref 1–1.03)
UROBILINOGEN UR QL: NORMAL

## 2021-08-26 PROCEDURE — 81003 URINALYSIS AUTO W/O SCOPE: CPT | Performed by: FAMILY MEDICINE

## 2021-08-26 PROCEDURE — 99213 OFFICE O/P EST LOW 20 MIN: CPT | Performed by: FAMILY MEDICINE

## 2021-08-26 RX ORDER — NITROFURANTOIN 25; 75 MG/1; MG/1
100 CAPSULE ORAL 2 TIMES DAILY
Qty: 20 CAPSULE | Refills: 0 | Status: SHIPPED | OUTPATIENT
Start: 2021-08-26 | End: 2022-01-27 | Stop reason: ALTCHOICE

## 2021-08-26 NOTE — PROGRESS NOTES
"Subjective   Tiarra Becerra is a 56 y.o. female.     Chief Complaint   Patient presents with   • Urinary Tract Infection     started 3 days ago with painful urination, no other symptoms       Visit Vitals  /84   Pulse 66   Temp 96.9 °F (36.1 °C) (Infrared)   Resp 16   Ht 167.6 cm (65.98\")   Wt 119 kg (261 lb 6.4 oz)   LMP 01/02/2018   SpO2 98%   BMI 42.21 kg/m²         Urinary Tract Infection   This is a new problem. The current episode started in the past 7 days. The problem has been unchanged. The quality of the pain is described as aching and burning. The pain is at a severity of 5/10. The pain is moderate. There has been no fever. Associated symptoms include frequency and urgency. Pertinent negatives include no chills, discharge, flank pain, hematuria, hesitancy, nausea, possible pregnancy, sweats or vomiting. She has tried nothing for the symptoms.      Pt has not had UTI since 2018.  The following portions of the patient's history were reviewed and updated as appropriate: allergies, current medications, past family history, past medical history, past social history, past surgical history and problem list.    Past Medical History:   Diagnosis Date   • Endometrial cancer (CMS/HCC) 11/28/2018   • Essential hypertension 8/6/2018   • Hx of radiation therapy       Past Surgical History:   Procedure Laterality Date   • D & C HYSTEROSCOPY MYOSURE  11/05/2018   • OOPHORECTOMY     • TOTAL LAPAROSCOPIC HYSTERECTOMY WITH DAVINCI ROBOT  11/30/2018    laproscopic with BSO, lymph node dissection, left parametrectomy, lysis ureters, Dr Jaffe. Cancer   • TOTAL LAPAROSCOPIC HYSTERECTOMY, LAPAROSCOPIC NODE DISSECTION N/A 11/30/2018    Procedure: TOTAL LAPAROSCOPIC HYSTERECTOMY WITH DAVINCI ROBOT, BILATERAL SALPINGO OOPHORECTOMY, WITH RADICAL LEFT PARAMETRECTOMY, BILATERAL UTERETAL LYSIS, PELVIC LYMPH NODE DISSECTION;  Surgeon: April Jaffe MD;  Location: [facility];  Service: DaVinci   • TUBAL ABDOMINAL LIGATION  " 2006      Family History   Problem Relation Age of Onset   • Breast cancer Cousin 55        50's   • Arthritis Mother    • Heart attack Mother    • Cancer Father         prostate   • Heart attack Father    • Hypertension Father    • Stroke Father    • Diabetes Sister         type 1   • Diabetes Brother         type 1   • Stroke Paternal Grandmother    • Ovarian cancer Neg Hx       Social History     Socioeconomic History   • Marital status:      Spouse name: Not on file   • Number of children: Not on file   • Years of education: Not on file   • Highest education level: Not on file   Tobacco Use   • Smoking status: Former Smoker     Packs/day: 0.75     Years: 12.00     Pack years: 9.00     Types: Cigarettes     Quit date:      Years since quittin.6   • Smokeless tobacco: Never Used   Vaping Use   • Vaping Use: Never used   Substance and Sexual Activity   • Alcohol use: Yes     Comment: on occasion   • Drug use: No   • Sexual activity: Yes     Partners: Male      Allergies   Allergen Reactions   • Lisinopril Cough       Review of Systems   Constitutional: Negative for chills.   Gastrointestinal: Negative for nausea and vomiting.   Genitourinary: Positive for frequency and urgency. Negative for flank pain, hematuria and hesitancy.       Objective   Physical Exam  Vitals and nursing note reviewed.   Constitutional:       Appearance: She is well-developed.   HENT:      Head: Normocephalic.      Right Ear: External ear normal.      Left Ear: External ear normal.      Nose: Nose normal.   Eyes:      General: Lids are normal.      Conjunctiva/sclera: Conjunctivae normal.      Pupils: Pupils are equal, round, and reactive to light.   Neck:      Thyroid: No thyroid mass or thyromegaly.      Trachea: Trachea normal.   Cardiovascular:      Rate and Rhythm: Normal rate and regular rhythm.      Heart sounds: No murmur heard.     Pulmonary:      Effort: Pulmonary effort is normal. No respiratory distress.       Breath sounds: Normal breath sounds. No decreased breath sounds, wheezing, rhonchi or rales.   Chest:      Chest wall: No tenderness.   Abdominal:      General: Bowel sounds are normal.      Palpations: Abdomen is soft.      Tenderness: There is no abdominal tenderness. There is no right CVA tenderness or left CVA tenderness.   Musculoskeletal:         General: Normal range of motion.      Cervical back: Normal range of motion and neck supple.   Skin:     General: Skin is warm and dry.   Neurological:      Mental Status: She is alert and oriented to person, place, and time.   Psychiatric:         Behavior: Behavior normal.         Assessment/Plan   Diagnoses and all orders for this visit:    1. Painful urination (Primary)  -     POC Urinalysis Dipstick, Automated  -     nitrofurantoin, macrocrystal-monohydrate, (Macrobid) 100 MG capsule; Take 1 capsule by mouth 2 (Two) Times a Day.  Dispense: 20 capsule; Refill: 0  -     Urine Culture - , Urine, Clean Catch; Future                   Current Outpatient Medications:   •  BIOTIN PO, Take  by mouth., Disp: , Rfl:   •  cetirizine (ZyrTEC) 10 MG tablet, Take 10 mg by mouth Every Other Day., Disp: , Rfl:   •  Cholecalciferol (VITAMIN D-3 PO), Take 2,000 Units by mouth Daily., Disp: , Rfl:   •  Glucosamine-Chondroitin-MSM (CONDROLITE PO), Take  by mouth., Disp: , Rfl:   •  losartan (COZAAR) 100 MG tablet, Take 1 tablet by mouth Daily., Disp: 90 tablet, Rfl: 1  •  metoprolol succinate XL (Toprol XL) 50 MG 24 hr tablet, Take 1 tablet by mouth Daily., Disp: 90 tablet, Rfl: 1  •  nitrofurantoin, macrocrystal-monohydrate, (Macrobid) 100 MG capsule, Take 1 capsule by mouth 2 (Two) Times a Day., Disp: 20 capsule, Rfl: 0    Return in about 1 week (around 9/2/2021), or if symptoms worsen or fail to improve, for Recheck, Next scheduled follow up.     Recent Results (from the past 168 hour(s))   POC Urinalysis Dipstick, Automated    Collection Time: 08/26/21  4:25 PM    Specimen:  Urine   Result Value Ref Range    Color Straw Yellow, Straw, Dark Yellow, Sushila    Clarity, UA Cloudy (A) Clear    Specific Gravity  1.015 1.005 - 1.030    pH, Urine 6.0 5.0 - 8.0    Leukocytes Large (3+) (A) Negative    Nitrite, UA Negative Negative    Protein, POC Negative Negative mg/dL    Glucose, UA Negative Negative, 1000 mg/dL (3+) mg/dL    Ketones, UA Negative Negative    Urobilinogen, UA Normal Normal    Bilirubin Negative Negative    Blood, UA 1+ (A) Negative

## 2021-08-27 ENCOUNTER — LAB (OUTPATIENT)
Dept: LAB | Facility: HOSPITAL | Age: 56
End: 2021-08-27

## 2021-08-27 DIAGNOSIS — R30.9 PAINFUL URINATION: ICD-10-CM

## 2021-08-27 PROCEDURE — 87186 SC STD MICRODIL/AGAR DIL: CPT | Performed by: FAMILY MEDICINE

## 2021-08-27 PROCEDURE — 87077 CULTURE AEROBIC IDENTIFY: CPT | Performed by: FAMILY MEDICINE

## 2021-08-27 PROCEDURE — 87086 URINE CULTURE/COLONY COUNT: CPT | Performed by: FAMILY MEDICINE

## 2021-08-29 LAB — BACTERIA SPEC AEROBE CULT: ABNORMAL

## 2021-08-31 ENCOUNTER — TELEPHONE (OUTPATIENT)
Dept: INTERNAL MEDICINE | Facility: CLINIC | Age: 56
End: 2021-08-31

## 2021-08-31 NOTE — TELEPHONE ENCOUNTER
PN of results.  She stated understanding.  She is feeling better and has a follow up appointment on Thursday

## 2021-08-31 NOTE — TELEPHONE ENCOUNTER
----- Message from Fallon RAY MD sent at 8/30/2021  5:53 PM EDT -----  Intermediate sensitivity to Macrodantin.  If she is not improving with E. coli UTI then need to change antibiotics.

## 2021-09-02 ENCOUNTER — LAB (OUTPATIENT)
Dept: LAB | Facility: HOSPITAL | Age: 56
End: 2021-09-02

## 2021-09-02 ENCOUNTER — OFFICE VISIT (OUTPATIENT)
Dept: INTERNAL MEDICINE | Facility: CLINIC | Age: 56
End: 2021-09-02

## 2021-09-02 VITALS
WEIGHT: 259 LBS | HEART RATE: 69 BPM | TEMPERATURE: 97.1 F | OXYGEN SATURATION: 98 % | HEIGHT: 66 IN | SYSTOLIC BLOOD PRESSURE: 118 MMHG | BODY MASS INDEX: 41.62 KG/M2 | DIASTOLIC BLOOD PRESSURE: 82 MMHG

## 2021-09-02 DIAGNOSIS — E78.2 MIXED HYPERLIPIDEMIA: ICD-10-CM

## 2021-09-02 DIAGNOSIS — R74.01 ELEVATED LIVER TRANSAMINASE LEVEL: ICD-10-CM

## 2021-09-02 DIAGNOSIS — I10 ESSENTIAL HYPERTENSION: Primary | ICD-10-CM

## 2021-09-02 DIAGNOSIS — I10 ESSENTIAL HYPERTENSION: ICD-10-CM

## 2021-09-02 DIAGNOSIS — R53.83 OTHER FATIGUE: ICD-10-CM

## 2021-09-02 DIAGNOSIS — Z23 NEED FOR TDAP VACCINATION: ICD-10-CM

## 2021-09-02 LAB
ALBUMIN SERPL-MCNC: 4.7 G/DL (ref 3.5–5.2)
ALBUMIN/GLOB SERPL: 2.2 G/DL
ALP SERPL-CCNC: 115 U/L (ref 39–117)
ALT SERPL W P-5'-P-CCNC: 38 U/L (ref 1–33)
ANION GAP SERPL CALCULATED.3IONS-SCNC: 12.7 MMOL/L (ref 5–15)
AST SERPL-CCNC: 25 U/L (ref 1–32)
BASOPHILS # BLD AUTO: 0.04 10*3/MM3 (ref 0–0.2)
BASOPHILS NFR BLD AUTO: 0.8 % (ref 0–1.5)
BILIRUB SERPL-MCNC: 0.5 MG/DL (ref 0–1.2)
BUN SERPL-MCNC: 12 MG/DL (ref 6–20)
BUN/CREAT SERPL: 14 (ref 7–25)
CALCIUM SPEC-SCNC: 9.4 MG/DL (ref 8.6–10.5)
CHLORIDE SERPL-SCNC: 98 MMOL/L (ref 98–107)
CHOLEST SERPL-MCNC: 220 MG/DL (ref 0–200)
CO2 SERPL-SCNC: 25.3 MMOL/L (ref 22–29)
CREAT SERPL-MCNC: 0.86 MG/DL (ref 0.57–1)
DEPRECATED RDW RBC AUTO: 40.6 FL (ref 37–54)
EOSINOPHIL # BLD AUTO: 0.12 10*3/MM3 (ref 0–0.4)
EOSINOPHIL NFR BLD AUTO: 2.4 % (ref 0.3–6.2)
ERYTHROCYTE [DISTWIDTH] IN BLOOD BY AUTOMATED COUNT: 13.1 % (ref 12.3–15.4)
ERYTHROCYTE [SEDIMENTATION RATE] IN BLOOD: 9 MM/HR (ref 0–30)
GFR SERPL CREATININE-BSD FRML MDRD: 68 ML/MIN/1.73
GLOBULIN UR ELPH-MCNC: 2.1 GM/DL
GLUCOSE SERPL-MCNC: 94 MG/DL (ref 65–99)
HCT VFR BLD AUTO: 45.4 % (ref 34–46.6)
HDLC SERPL-MCNC: 63 MG/DL (ref 40–60)
HGB BLD-MCNC: 15 G/DL (ref 12–15.9)
IMM GRANULOCYTES # BLD AUTO: 0.02 10*3/MM3 (ref 0–0.05)
IMM GRANULOCYTES NFR BLD AUTO: 0.4 % (ref 0–0.5)
LDLC SERPL CALC-MCNC: 115 MG/DL (ref 0–100)
LDLC/HDLC SERPL: 1.71 {RATIO}
LYMPHOCYTES # BLD AUTO: 0.48 10*3/MM3 (ref 0.7–3.1)
LYMPHOCYTES NFR BLD AUTO: 9.6 % (ref 19.6–45.3)
MCH RBC QN AUTO: 28.3 PG (ref 26.6–33)
MCHC RBC AUTO-ENTMCNC: 33 G/DL (ref 31.5–35.7)
MCV RBC AUTO: 85.7 FL (ref 79–97)
MONOCYTES # BLD AUTO: 0.3 10*3/MM3 (ref 0.1–0.9)
MONOCYTES NFR BLD AUTO: 6 % (ref 5–12)
NEUTROPHILS NFR BLD AUTO: 4.03 10*3/MM3 (ref 1.7–7)
NEUTROPHILS NFR BLD AUTO: 80.8 % (ref 42.7–76)
NRBC BLD AUTO-RTO: 0.2 /100 WBC (ref 0–0.2)
PLATELET # BLD AUTO: 192 10*3/MM3 (ref 140–450)
PMV BLD AUTO: 10.3 FL (ref 6–12)
POTASSIUM SERPL-SCNC: 4 MMOL/L (ref 3.5–5.2)
PROT SERPL-MCNC: 6.8 G/DL (ref 6–8.5)
RBC # BLD AUTO: 5.3 10*6/MM3 (ref 3.77–5.28)
SODIUM SERPL-SCNC: 136 MMOL/L (ref 136–145)
TRIGL SERPL-MCNC: 247 MG/DL (ref 0–150)
VLDLC SERPL-MCNC: 42 MG/DL (ref 5–40)
WBC # BLD AUTO: 4.99 10*3/MM3 (ref 3.4–10.8)

## 2021-09-02 PROCEDURE — 80061 LIPID PANEL: CPT | Performed by: FAMILY MEDICINE

## 2021-09-02 PROCEDURE — 82306 VITAMIN D 25 HYDROXY: CPT | Performed by: FAMILY MEDICINE

## 2021-09-02 PROCEDURE — 84443 ASSAY THYROID STIM HORMONE: CPT | Performed by: FAMILY MEDICINE

## 2021-09-02 PROCEDURE — 99214 OFFICE O/P EST MOD 30 MIN: CPT | Performed by: FAMILY MEDICINE

## 2021-09-02 PROCEDURE — 90471 IMMUNIZATION ADMIN: CPT | Performed by: FAMILY MEDICINE

## 2021-09-02 PROCEDURE — 82607 VITAMIN B-12: CPT | Performed by: FAMILY MEDICINE

## 2021-09-02 PROCEDURE — 82746 ASSAY OF FOLIC ACID SERUM: CPT | Performed by: FAMILY MEDICINE

## 2021-09-02 PROCEDURE — 80074 ACUTE HEPATITIS PANEL: CPT | Performed by: FAMILY MEDICINE

## 2021-09-02 PROCEDURE — 90715 TDAP VACCINE 7 YRS/> IM: CPT | Performed by: FAMILY MEDICINE

## 2021-09-02 PROCEDURE — 85652 RBC SED RATE AUTOMATED: CPT | Performed by: FAMILY MEDICINE

## 2021-09-02 PROCEDURE — 84439 ASSAY OF FREE THYROXINE: CPT | Performed by: FAMILY MEDICINE

## 2021-09-02 PROCEDURE — 85025 COMPLETE CBC W/AUTO DIFF WBC: CPT | Performed by: FAMILY MEDICINE

## 2021-09-02 PROCEDURE — 80053 COMPREHEN METABOLIC PANEL: CPT | Performed by: FAMILY MEDICINE

## 2021-09-02 RX ORDER — LOSARTAN POTASSIUM 100 MG/1
100 TABLET ORAL DAILY
Qty: 90 TABLET | Refills: 1 | Status: SHIPPED | OUTPATIENT
Start: 2021-09-02 | End: 2022-03-03 | Stop reason: SDUPTHER

## 2021-09-02 RX ORDER — METOPROLOL SUCCINATE 50 MG/1
50 TABLET, EXTENDED RELEASE ORAL DAILY
Qty: 90 TABLET | Refills: 1 | Status: SHIPPED | OUTPATIENT
Start: 2021-09-02 | End: 2022-03-03 | Stop reason: SDUPTHER

## 2021-09-02 NOTE — PROGRESS NOTES
"Subjective   Tiarra Becerra is a 56 y.o. female.     Chief Complaint   Patient presents with   • Hypertension     follow up   • Med Refill       Visit Vitals  /82 (BP Location: Left arm, Patient Position: Sitting)   Pulse 69   Temp 97.1 °F (36.2 °C) (Infrared)   Ht 167.6 cm (65.98\")   Wt 117 kg (259 lb)   LMP 01/02/2018   SpO2 98%   BMI 41.83 kg/m²       Wt Readings from Last 3 Encounters:   09/02/21 117 kg (259 lb)   08/26/21 119 kg (261 lb 6.4 oz)   07/21/21 120 kg (265 lb 1.6 oz)         Hypertension  This is a chronic problem. The current episode started more than 1 year ago. The problem is unchanged. The problem is controlled. Associated symptoms include malaise/fatigue. Pertinent negatives include no anxiety, blurred vision, chest pain, headaches, neck pain, orthopnea, palpitations, peripheral edema, PND, shortness of breath or sweats. There are no associated agents to hypertension. Risk factors for coronary artery disease include dyslipidemia, obesity, post-menopausal state and family history. Current antihypertension treatment includes angiotensin blockers and beta blockers. The current treatment provides significant improvement. There are no compliance problems.  There is no history of angina, kidney disease, CAD/MI, CVA, heart failure, left ventricular hypertrophy, PVD or retinopathy. There is no history of chronic renal disease, sleep apnea or a thyroid problem.   Fatigue  Chronicity: 20 yrs. The current episode started more than 1 year ago. The problem occurs constantly. The problem has been unchanged. Associated symptoms include fatigue. Pertinent negatives include no abdominal pain, anorexia, arthralgias, change in bowel habit, chest pain, chills, congestion, coughing, diaphoresis, fever, headaches, joint swelling, myalgias, nausea, neck pain, numbness, rash, sore throat, swollen glands, urinary symptoms, vertigo, visual change, vomiting or weakness. Nothing aggravates the symptoms. She has " tried nothing for the symptoms. The treatment provided no relief.   Hyperlipidemia  This is a chronic problem. The current episode started more than 1 year ago. Condition status: pending. Exacerbating diseases include obesity. She has no history of chronic renal disease, diabetes, hypothyroidism, liver disease or nephrotic syndrome. Factors aggravating her hyperlipidemia include beta blockers. Pertinent negatives include no chest pain, focal sensory loss, focal weakness, leg pain, myalgias or shortness of breath. Current antihyperlipidemic treatment includes diet change and exercise. Improvement on treatment: pending. There are no compliance problems.  Risk factors for coronary artery disease include dyslipidemia, family history, hypertension, obesity and post-menopausal.      Pt had elevated liver enzymes on last lab. Pt has hx of fatty liver.   Pt is exercising 3x per week  The following portions of the patient's history were reviewed and updated as appropriate: allergies, current medications, past family history, past medical history, past social history, past surgical history and problem list.    Past Medical History:   Diagnosis Date   • Endometrial cancer (CMS/HCC) 11/28/2018   • Essential hypertension 8/6/2018   • Hx of radiation therapy       Past Surgical History:   Procedure Laterality Date   • D & C HYSTEROSCOPY MYOSURE  11/05/2018   • OOPHORECTOMY     • TOTAL LAPAROSCOPIC HYSTERECTOMY WITH DAVINCI ROBOT  11/30/2018    laproscopic with BSO, lymph node dissection, left parametrectomy, lysis ureters, Dr Jaffe. Cancer   • TOTAL LAPAROSCOPIC HYSTERECTOMY, LAPAROSCOPIC NODE DISSECTION N/A 11/30/2018    Procedure: TOTAL LAPAROSCOPIC HYSTERECTOMY WITH DAVINCI ROBOT, BILATERAL SALPINGO OOPHORECTOMY, WITH RADICAL LEFT PARAMETRECTOMY, BILATERAL UTERETAL LYSIS, PELVIC LYMPH NODE DISSECTION;  Surgeon: April Jaffe MD;  Location: [facility] license of [facility];  Service: DaVinci   • TUBAL ABDOMINAL LIGATION  2006      Family  History   Problem Relation Age of Onset   • Breast cancer Cousin 55        50's   • Arthritis Mother    • Heart attack Mother    • Cancer Father         prostate   • Heart attack Father    • Hypertension Father    • Stroke Father    • Diabetes Sister         type 1   • Diabetes Brother         type 1   • Stroke Paternal Grandmother    • Ovarian cancer Neg Hx       Social History     Socioeconomic History   • Marital status:      Spouse name: Not on file   • Number of children: Not on file   • Years of education: Not on file   • Highest education level: Not on file   Tobacco Use   • Smoking status: Former Smoker     Packs/day: 0.75     Years: 12.00     Pack years: 9.00     Types: Cigarettes     Quit date:      Years since quittin.6   • Smokeless tobacco: Never Used   Vaping Use   • Vaping Use: Never used   Substance and Sexual Activity   • Alcohol use: Yes     Comment: on occasion   • Drug use: No   • Sexual activity: Yes     Partners: Male      Allergies   Allergen Reactions   • Lisinopril Cough       Review of Systems   Constitutional: Positive for fatigue and malaise/fatigue. Negative for chills, diaphoresis and fever.   HENT: Negative for congestion and sore throat.    Eyes: Negative for blurred vision.   Respiratory: Negative for cough and shortness of breath.    Cardiovascular: Negative for chest pain, palpitations, orthopnea and PND.   Gastrointestinal: Negative for abdominal pain, anorexia, change in bowel habit, nausea and vomiting.   Musculoskeletal: Negative for arthralgias, joint swelling, myalgias and neck pain.   Skin: Negative for rash.   Neurological: Negative for vertigo, focal weakness, weakness, numbness and headaches.       Objective   Physical Exam  Vitals and nursing note reviewed.   Constitutional:       Appearance: She is well-developed.      Comments: Last 2 weights  -------------------------              21                    0805         -------------------------   Weight: 117 kg (259 lb)  -------------------------    Body mass index is 41.83 kg/m².     HENT:      Head: Normocephalic.      Right Ear: External ear normal.      Left Ear: External ear normal.      Nose: Nose normal.   Eyes:      General: Lids are normal.      Conjunctiva/sclera: Conjunctivae normal.      Pupils: Pupils are equal, round, and reactive to light.   Neck:      Thyroid: No thyroid mass or thyromegaly.      Vascular: No carotid bruit.      Trachea: Trachea normal.   Cardiovascular:      Rate and Rhythm: Normal rate and regular rhythm.      Heart sounds: No murmur heard.     Pulmonary:      Effort: Pulmonary effort is normal. No respiratory distress.      Breath sounds: Normal breath sounds. No decreased breath sounds, wheezing, rhonchi or rales.   Chest:      Chest wall: No tenderness.   Abdominal:      General: Bowel sounds are normal.      Palpations: Abdomen is soft.      Tenderness: There is no abdominal tenderness.   Musculoskeletal:         General: Normal range of motion.      Cervical back: Normal range of motion and neck supple.   Skin:     General: Skin is warm and dry.   Neurological:      Mental Status: She is alert and oriented to person, place, and time.   Psychiatric:         Behavior: Behavior normal.         Assessment/Plan   Diagnoses and all orders for this visit:    1. Essential hypertension (Primary)  -     losartan (COZAAR) 100 MG tablet; Take 1 tablet by mouth Daily.  Dispense: 90 tablet; Refill: 1  -     metoprolol succinate XL (Toprol XL) 50 MG 24 hr tablet; Take 1 tablet by mouth Daily.  Dispense: 90 tablet; Refill: 1  -     Comprehensive Metabolic Panel; Future  -     Lipid Panel; Future  -     Hepatitis Panel, Acute; Future  -     TSH; Future  -     CBC & Differential; Future    2. Elevated liver transaminase level  -     Hepatitis Panel, Acute; Future    3. Other fatigue  -     Vitamin B12; Future  -     TSH; Future  -     T4, Free;  Future  -     Folate; Future  -     Vitamin D 25 Hydroxy; Future  -     CBC & Differential; Future  -     Sedimentation Rate; Future    4. Mixed hyperlipidemia  -     Comprehensive Metabolic Panel; Future  -     Lipid Panel; Future    5. Need for Tdap vaccination  -     Tdap Vaccine Greater Than or Equal To 6yo IM                   Current Outpatient Medications:   •  BIOTIN PO, Take  by mouth., Disp: , Rfl:   •  cetirizine (ZyrTEC) 10 MG tablet, Take 10 mg by mouth Every Other Day., Disp: , Rfl:   •  Cholecalciferol (VITAMIN D-3 PO), Take 2,000 Units by mouth Daily., Disp: , Rfl:   •  Glucosamine-Chondroitin-MSM (CONDROLITE PO), Take  by mouth., Disp: , Rfl:   •  losartan (COZAAR) 100 MG tablet, Take 1 tablet by mouth Daily., Disp: 90 tablet, Rfl: 1  •  metoprolol succinate XL (Toprol XL) 50 MG 24 hr tablet, Take 1 tablet by mouth Daily., Disp: 90 tablet, Rfl: 1  •  nitrofurantoin, macrocrystal-monohydrate, (Macrobid) 100 MG capsule, Take 1 capsule by mouth 2 (Two) Times a Day., Disp: 20 capsule, Rfl: 0    Return in about 6 months (around 3/2/2022), or if symptoms worsen or fail to improve, for Recheck.

## 2021-09-03 LAB
25(OH)D3 SERPL-MCNC: 31.5 NG/ML (ref 30–100)
FOLATE SERPL-MCNC: 12.5 NG/ML (ref 4.78–24.2)
HAV IGM SERPL QL IA: NORMAL
HBV CORE IGM SERPL QL IA: NORMAL
HBV SURFACE AG SERPL QL IA: NORMAL
HCV AB SER DONR QL: NORMAL
T4 FREE SERPL-MCNC: 1.07 NG/DL (ref 0.93–1.7)
TSH SERPL DL<=0.05 MIU/L-ACNC: 2.4 UIU/ML (ref 0.27–4.2)
VIT B12 BLD-MCNC: 436 PG/ML (ref 211–946)

## 2021-09-07 ENCOUNTER — TELEPHONE (OUTPATIENT)
Dept: INTERNAL MEDICINE | Facility: CLINIC | Age: 56
End: 2021-09-07

## 2021-09-07 NOTE — TELEPHONE ENCOUNTER
----- Message from Fallon RAY MD sent at 9/6/2021  1:30 PM EDT -----  Triglycerides and LDL remain elevated need to consider adding statins.  Please follow a low animal fat, low sugar, low alcohol diet.

## 2021-09-16 ENCOUNTER — HOSPITAL ENCOUNTER (OUTPATIENT)
Dept: MAMMOGRAPHY | Facility: HOSPITAL | Age: 56
Discharge: HOME | End: 2021-09-16
Admitting: FAMILY MEDICINE

## 2021-09-16 DIAGNOSIS — Z12.31 VISIT FOR SCREENING MAMMOGRAM: ICD-10-CM

## 2021-09-16 PROCEDURE — 77063 BREAST TOMOSYNTHESIS BI: CPT

## 2021-09-16 PROCEDURE — 77063 BREAST TOMOSYNTHESIS BI: CPT | Performed by: RADIOLOGY

## 2021-09-16 PROCEDURE — 77067 SCR MAMMO BI INCL CAD: CPT

## 2021-09-16 PROCEDURE — 77067 SCR MAMMO BI INCL CAD: CPT | Performed by: RADIOLOGY

## 2022-01-27 ENCOUNTER — OFFICE VISIT (OUTPATIENT)
Dept: GYNECOLOGIC ONCOLOGY | Facility: CLINIC | Age: 57
End: 2022-01-27

## 2022-01-27 VITALS
RESPIRATION RATE: 16 BRPM | WEIGHT: 265 LBS | DIASTOLIC BLOOD PRESSURE: 80 MMHG | TEMPERATURE: 96.6 F | BODY MASS INDEX: 42.8 KG/M2 | SYSTOLIC BLOOD PRESSURE: 117 MMHG | OXYGEN SATURATION: 97 % | HEART RATE: 66 BPM

## 2022-01-27 DIAGNOSIS — Z85.42 HISTORY OF ENDOMETRIAL CANCER: Primary | ICD-10-CM

## 2022-01-27 PROCEDURE — 99212 OFFICE O/P EST SF 10 MIN: CPT | Performed by: NURSE PRACTITIONER

## 2022-01-27 NOTE — PROGRESS NOTES
GYN ONCOLOGY CANCER SURVEILLANCE FOLLOW-UP    Tiarra Becerar  [id number]  1965    Subjective   Chief Complaint: Uterine Cancer (no complaints)        History of present illness:     Tiarra Becerra is a 56 y.o. year old female who is here today for ongoing surveillance of Endometrial Cancer, see Cancer History. She is approaching 3 years out from completion of treatment. She reports she is feeling very well today and has no complaints. She denies vaginal bleeding, pelvic pain, concerning lesions, and changes in bowel or bladder function.        Cancer History:   Oncology/Hematology History   Endometrial cancer (HCC)   10/2018 Imaging    Patient presented to Dr. Davis for Annual exam with c/o new onset vaginal bleeding after amenorrhea x 8 months.   TVUS revealed large polyp vs fibroid in endocervical canal     11/5/2018 Procedure    ECC and Myosure by gynecologist. Pathology revealed grade 1 endometrioid adenocarcinoma in a background of complex atypical hyperplasia, suspicion for myoinvasion. Pt referred to Gyn Oncology     11/30/2018 Surgery    RTLH/BSO, radical left parametrectomy, left ureteral lysis, and pelvic lymph node dissection.   There was suspicion for concurrent cervical cancer at time of surgery, found to be multifocal grade 2-3 endometrioid carcinoma with extensive lower uterine segment involvement upon final pathology. Tumor invasive into over 50% of the myometrium (11/14 mm). Nodes negative. MSI testing normal. Stage IB grade 2-3     12/26/2018 Imaging    Post-op CT abdomen and pelvis negative     1/15/2019 - 2/18/2019 Radiation    Radiation OncologyTreatment Course:  Tiarra Becerra received 4500 cGy in 25 fractions to pelvis via External Beam Radiation - EBRT.     2/26/2019 - 2/28/2019 Radiation    Radiation OncologyTreatment Course:  Tiarra Becerra received 1200 cGy in 2 fractions to vagina via High Dose Radiation - HDR.     6/12/2019 Imaging    CT chest, abdomen, pelvis negative     12/2/2019  Survivorship    Survivorship Care Plan completed and discussed with patient.  Copy of Survivorship Care Plan provided to patient and primary care provider.           The current medication list and allergy list were reviewed and reconciled.     Past Medical History, Past Surgical History, Social History, Family History have been reviewed and are without significant changes except as mentioned.      Review of Systems   Constitutional: Negative.    Gastrointestinal: Negative.    Genitourinary: Negative.          Objective   Physical Exam  Vital Signs: /80   Pulse 66   Temp 96.6 °F (35.9 °C)   Resp 16   Wt 120 kg (265 lb)   LMP 01/02/2018   SpO2 97%   BMI 42.80 kg/m²   Vitals:    01/27/22 0856   PainSc: 0-No pain           General Appearance:  alert, cooperative, no apparent distress, appears stated age and obese by BMI 42.80   Neurologic/Psychiatric: A&O x 3, gait steady, appropriate affect   HEENT:  Normocephalic, without obvious abnormality, mucous membranes moist   Abdomen:   Soft, non-tender, non-distended, no organomegaly and Exam limited d/t habitus.   Lymph nodes: No cervical, supraclavicular, inguinal adenopathy noted   Pelvic: External Genitalia  without lesions or skin changes  Vagina  is pink, moist, without lesions.   Vaginal Cuff  Female Vaginal Cuff: smooth, intact and without visible lesions  Uterus  surgically absent and no palpable masses  Ovaries  surgically absent bilaterally  Parametria  smooth  Rectovaginal  Female rectovaginal: deferred     ECOG score: 0             Result Review :   Last imaging study was CT 6/12/2019.     PHQ-9 Total Score: 0    Procedure Note:  No notes on file         Assessment and Plan:    Diagnoses and all orders for this visit:    1. History of endometrial cancer (Primary)          There is no evidence of disease upon today's exam. Continue every 6 month cancer surveillance until the 5 year bernadine. She is understanding to call with any changes in pelvic  symptoms or general GYN concerns at any time between regularly scheduled visits.       Pain assessment was performed today as a part of patient’s care.  For patients with pain related to surgery, gynecologic malignancy or cancer treatment, the plan is as noted in the assessment/plan.  For patients with pain not related to these issues, they are to seek any further needed care from a more appropriate provider, such as PCP.      Follow-up:     Return to clinic in 6 months for ongoing cancer surveillance.      Electronically signed by LIANNA Baum on 01/27/22 at 09:16 EST

## 2022-03-03 ENCOUNTER — LAB (OUTPATIENT)
Dept: LAB | Facility: HOSPITAL | Age: 57
End: 2022-03-03

## 2022-03-03 ENCOUNTER — OFFICE VISIT (OUTPATIENT)
Dept: INTERNAL MEDICINE | Facility: CLINIC | Age: 57
End: 2022-03-03

## 2022-03-03 VITALS
BODY MASS INDEX: 42.11 KG/M2 | HEIGHT: 66 IN | HEART RATE: 67 BPM | OXYGEN SATURATION: 95 % | SYSTOLIC BLOOD PRESSURE: 116 MMHG | TEMPERATURE: 96.9 F | DIASTOLIC BLOOD PRESSURE: 80 MMHG | WEIGHT: 262 LBS

## 2022-03-03 DIAGNOSIS — I10 ESSENTIAL HYPERTENSION: ICD-10-CM

## 2022-03-03 LAB
CHOLEST SERPL-MCNC: 222 MG/DL (ref 0–200)
HDLC SERPL-MCNC: 62 MG/DL (ref 40–60)
LDLC SERPL CALC-MCNC: 127 MG/DL (ref 0–100)
LDLC/HDLC SERPL: 1.97 {RATIO}
TRIGL SERPL-MCNC: 188 MG/DL (ref 0–150)
VLDLC SERPL-MCNC: 33 MG/DL (ref 5–40)

## 2022-03-03 PROCEDURE — 80061 LIPID PANEL: CPT | Performed by: FAMILY MEDICINE

## 2022-03-03 PROCEDURE — 99213 OFFICE O/P EST LOW 20 MIN: CPT | Performed by: FAMILY MEDICINE

## 2022-03-03 RX ORDER — LOSARTAN POTASSIUM 100 MG/1
100 TABLET ORAL DAILY
Qty: 90 TABLET | Refills: 1 | Status: SHIPPED | OUTPATIENT
Start: 2022-03-03 | End: 2022-09-02

## 2022-03-03 RX ORDER — METOPROLOL SUCCINATE 50 MG/1
50 TABLET, EXTENDED RELEASE ORAL DAILY
Qty: 90 TABLET | Refills: 1 | Status: SHIPPED | OUTPATIENT
Start: 2022-03-03 | End: 2022-09-02

## 2022-03-03 NOTE — PROGRESS NOTES
"Subjective   Tairra Becerra is a 57 y.o. female.     Chief Complaint   Patient presents with   • Hypertension       Visit Vitals  /80   Pulse 67   Temp 96.9 °F (36.1 °C)   Ht 167.6 cm (66\")   Wt 119 kg (262 lb)   LMP 01/02/2018   SpO2 95%   BMI 42.29 kg/m²       Wt Readings from Last 3 Encounters:   03/03/22 119 kg (262 lb)   01/27/22 120 kg (265 lb)   09/02/21 117 kg (259 lb)         Hypertension  This is a chronic problem. The current episode started more than 1 year ago. The problem is unchanged. The problem is controlled. Pertinent negatives include no anxiety, blurred vision, chest pain, headaches, malaise/fatigue, neck pain, orthopnea, palpitations, peripheral edema, PND, shortness of breath or sweats. Risk factors for coronary artery disease include dyslipidemia, family history, obesity and post-menopausal state. Current antihypertension treatment includes beta blockers and angiotensin blockers. The current treatment provides significant improvement. There is no history of angina, kidney disease, CAD/MI, CVA, heart failure, left ventricular hypertrophy, PVD or retinopathy. There is no history of sleep apnea or a thyroid problem.      Pt is playing [employer] ball 3x this week.   Pt had elevated triglycerides on last lab.     Pt is up to date on her vaccines.   The following portions of the patient's history were reviewed and updated as appropriate: allergies, current medications, past family history, past medical history, past social history, past surgical history and problem list.    Past Medical History:   Diagnosis Date   • Endometrial cancer (HCC) 11/28/2018   • Essential hypertension 8/6/2018   • Hx of radiation therapy       Past Surgical History:   Procedure Laterality Date   • D & C HYSTEROSCOPY MYOSURE  11/05/2018   • OOPHORECTOMY     • TOTAL LAPAROSCOPIC HYSTERECTOMY WITH DAVINCI ROBOT  11/30/2018    laproscopic with BSO, lymph node dissection, left parametrectomy, lysis ureters, Dr Jaffe. " Cancer   • TOTAL LAPAROSCOPIC HYSTERECTOMY, LAPAROSCOPIC NODE DISSECTION N/A 2018    Procedure: TOTAL LAPAROSCOPIC HYSTERECTOMY WITH DAVINCI ROBOT, BILATERAL SALPINGO OOPHORECTOMY, WITH RADICAL LEFT PARAMETRECTOMY, BILATERAL UTERETAL LYSIS, PELVIC LYMPH NODE DISSECTION;  Surgeon: April Jaffe MD;  Location: [facility];  Service: DaVinci   • TUBAL ABDOMINAL LIGATION        Family History   Problem Relation Age of Onset   • Breast cancer Cousin 55        50's   • Arthritis Mother    • Heart attack Mother    • Cancer Father         prostate   • Heart attack Father    • Hypertension Father    • Stroke Father    • Diabetes Sister         type 1   • Diabetes Brother         type 1   • Stroke Paternal Grandmother    • Ovarian cancer Neg Hx       Social History     Socioeconomic History   • Marital status:    Tobacco Use   • Smoking status: Former Smoker     Packs/day: 0.75     Years: 12.00     Pack years: 9.00     Types: Cigarettes     Quit date:      Years since quittin.1   • Smokeless tobacco: Never Used   Vaping Use   • Vaping Use: Never used   Substance and Sexual Activity   • Alcohol use: Yes     Comment: on occasion   • Drug use: No   • Sexual activity: Yes     Partners: Male      Allergies   Allergen Reactions   • Lisinopril Cough       Review of Systems   Constitutional: Negative for malaise/fatigue.   Eyes: Negative for blurred vision.   Respiratory: Negative for shortness of breath.    Cardiovascular: Negative for chest pain, palpitations, orthopnea and PND.   Musculoskeletal: Negative for neck pain.   Neurological: Negative for headaches.       Objective   Physical Exam  Vitals and nursing note reviewed.   Constitutional:       Appearance: She is well-developed.      Comments: Last 2 weights  -------------------------              22                    [id number]        -------------------------   Weight: 119 kg (262 lb)  -------------------------    Body mass index  is 42.29 kg/m².     HENT:      Head: Normocephalic.      Right Ear: External ear normal.      Left Ear: External ear normal.      Nose: Nose normal.   Eyes:      General: Lids are normal.      Conjunctiva/sclera: Conjunctivae normal.      Pupils: Pupils are equal, round, and reactive to light.   Neck:      Thyroid: No thyroid mass or thyromegaly.      Vascular: No carotid bruit.      Trachea: Trachea normal.   Cardiovascular:      Rate and Rhythm: Normal rate and regular rhythm.      Heart sounds: No murmur heard.      Pulmonary:      Effort: Pulmonary effort is normal. No respiratory distress.      Breath sounds: Normal breath sounds. No decreased breath sounds, wheezing, rhonchi or rales.   Chest:      Chest wall: No tenderness.   Abdominal:      General: Bowel sounds are normal.      Palpations: Abdomen is soft.      Tenderness: There is no abdominal tenderness.   Musculoskeletal:         General: Normal range of motion.      Cervical back: Normal range of motion and neck supple.   Skin:     General: Skin is warm and dry.   Neurological:      Mental Status: She is alert and oriented to person, place, and time.   Psychiatric:         Behavior: Behavior normal.         Assessment/Plan   Diagnoses and all orders for this visit:    1. Essential hypertension  -     losartan (COZAAR) 100 MG tablet; Take 1 tablet by mouth Daily.  Dispense: 90 tablet; Refill: 1  -     metoprolol succinate XL (Toprol XL) 50 MG 24 hr tablet; Take 1 tablet by mouth Daily.  Dispense: 90 tablet; Refill: 1  -     Comprehensive Metabolic Panel; Future  -     Lipid Panel; Future      Continue playing [employer] ball.              Current Outpatient Medications:   •  cetirizine (ZyrTEC) 10 MG tablet, Take 10 mg by mouth Daily., Disp: , Rfl:   •  Cholecalciferol (VITAMIN D-3 PO), Take 2,000 Units by mouth Daily., Disp: , Rfl:   •  losartan (COZAAR) 100 MG tablet, Take 1 tablet by mouth Daily., Disp: 90 tablet, Rfl: 1  •  metoprolol succinate XL  (Toprol XL) 50 MG 24 hr tablet, Take 1 tablet by mouth Daily., Disp: 90 tablet, Rfl: 1    Return in 6 months (on 9/3/2022), or if symptoms worsen or fail to improve, for Annual, Recheck.

## 2022-04-06 ENCOUNTER — OFFICE VISIT (OUTPATIENT)
Dept: INTERNAL MEDICINE | Facility: CLINIC | Age: 57
End: 2022-04-06

## 2022-04-06 VITALS
SYSTOLIC BLOOD PRESSURE: 118 MMHG | HEIGHT: 66 IN | WEIGHT: 259 LBS | TEMPERATURE: 96.9 F | OXYGEN SATURATION: 96 % | BODY MASS INDEX: 41.62 KG/M2 | DIASTOLIC BLOOD PRESSURE: 76 MMHG | HEART RATE: 68 BPM

## 2022-04-06 DIAGNOSIS — R10.32 LEFT LOWER QUADRANT ABDOMINAL PAIN: Primary | ICD-10-CM

## 2022-04-06 DIAGNOSIS — R00.2 HEART PALPITATIONS: ICD-10-CM

## 2022-04-06 LAB
BILIRUB BLD-MCNC: NEGATIVE MG/DL
CLARITY, POC: CLEAR
COLOR UR: YELLOW
EXPIRATION DATE: NORMAL
GLUCOSE UR STRIP-MCNC: NEGATIVE MG/DL
KETONES UR QL: NEGATIVE
LEUKOCYTE EST, POC: NEGATIVE
Lab: NORMAL
NITRITE UR-MCNC: NEGATIVE MG/ML
PH UR: 6 [PH] (ref 5–8)
PROT UR STRIP-MCNC: NEGATIVE MG/DL
RBC # UR STRIP: NEGATIVE /UL
SP GR UR: 1.01 (ref 1–1.03)
UROBILINOGEN UR QL: NORMAL

## 2022-04-06 PROCEDURE — 93000 ELECTROCARDIOGRAM COMPLETE: CPT | Performed by: FAMILY MEDICINE

## 2022-04-06 PROCEDURE — 81003 URINALYSIS AUTO W/O SCOPE: CPT | Performed by: FAMILY MEDICINE

## 2022-04-06 PROCEDURE — 99214 OFFICE O/P EST MOD 30 MIN: CPT | Performed by: FAMILY MEDICINE

## 2022-04-06 RX ORDER — LEVOFLOXACIN 500 MG/1
500 TABLET, FILM COATED ORAL DAILY
Qty: 7 TABLET | Refills: 0 | Status: SHIPPED | OUTPATIENT
Start: 2022-04-06 | End: 2022-04-14

## 2022-04-06 RX ORDER — METRONIDAZOLE 500 MG/1
500 TABLET ORAL 3 TIMES DAILY
Qty: 30 TABLET | Refills: 0 | Status: SHIPPED | OUTPATIENT
Start: 2022-04-06 | End: 2022-09-07

## 2022-04-06 NOTE — PROGRESS NOTES
"Subjective   Tiarra Becerra is a 57 y.o. female.     Chief Complaint   Patient presents with   • low BP     Bp raunning 108/78 last night, this morn before med 118/79   • heart flutter     Occasional heart flutter at bedtime 4 day.  Noticed left side abdominal pain     • Abdominal Pain     Left side abdominal pain       Visit Vitals  /76   Pulse 68   Temp 96.9 °F (36.1 °C)   Ht 167.6 cm (66\")   Wt 117 kg (259 lb)   LMP 01/02/2018   SpO2 96%   BMI 41.80 kg/m²       Wt Readings from Last 3 Encounters:   04/06/22 117 kg (259 lb)   03/03/22 119 kg (262 lb)   01/27/22 120 kg (265 lb)         Abdominal Pain  This is a new problem. The current episode started in the past 7 days. The problem occurs constantly. The problem has been unchanged. The pain is located in the LLQ. The pain is at a severity of 5/10. The pain is moderate. The quality of the pain is aching. The abdominal pain does not radiate. Associated symptoms include anorexia (yesterday), flatus and weight loss. Pertinent negatives include no arthralgias, belching, constipation, diarrhea, dysuria, fever, frequency, headaches, hematochezia, hematuria, melena, myalgias, nausea or vomiting. The pain is aggravated by palpation and eating. The pain is relieved by nothing. She has tried nothing for the symptoms. Her past medical history is significant for abdominal surgery. There is no history of colon cancer, Crohn's disease, gallstones, GERD, irritable bowel syndrome, pancreatitis, PUD or ulcerative colitis.   Palpitations   This is a new problem. The current episode started in the past 7 days. The problem occurs intermittently. The problem has been waxing and waning. Nothing aggravates the symptoms. Associated symptoms include coughing, an irregular heartbeat and malaise/fatigue. Pertinent negatives include no anxiety, chest fullness, chest pain, diaphoresis, dizziness, fever, nausea, near-syncope, numbness, shortness of breath, syncope, vomiting or weakness. " She has tried nothing for the symptoms. The treatment provided no relief. Risk factors include obesity.      Pt had palpitations on Sunday and Monday evening that was more than usual.  Pt has 1 cup of coffee in the morning.     Pt has LLQ abdominal pain that started on Sunday.   Pt has been working on weight loss.  Pt has been dieting and exercise. Pt is using [employer].      The following portions of the patient's history were reviewed and updated as appropriate: allergies, current medications, past family history, past medical history, past social history, past surgical history and problem list.    Past Medical History:   Diagnosis Date   • Endometrial cancer (HCC) 11/28/2018   • Essential hypertension 8/6/2018   • Hx of radiation therapy       Past Surgical History:   Procedure Laterality Date   • COLONOSCOPY  11/25/2019    Dr Giraldo, tics, internal hemorrhoids   • D & C HYSTEROSCOPY MYOSURE  11/05/2018   • OOPHORECTOMY     • TOTAL LAPAROSCOPIC HYSTERECTOMY WITH DAVINCI ROBOT  11/30/2018    laproscopic with BSO, lymph node dissection, left parametrectomy, lysis ureters, Dr Jaffe. Cancer   • TOTAL LAPAROSCOPIC HYSTERECTOMY, LAPAROSCOPIC NODE DISSECTION N/A 11/30/2018    Procedure: TOTAL LAPAROSCOPIC HYSTERECTOMY WITH DAVINCI ROBOT, BILATERAL SALPINGO OOPHORECTOMY, WITH RADICAL LEFT PARAMETRECTOMY, BILATERAL UTERETAL LYSIS, PELVIC LYMPH NODE DISSECTION;  Surgeon: April Jaffe MD;  Location: [facility];  Service: DaVinci   • TUBAL ABDOMINAL LIGATION  2006      Family History   Problem Relation Age of Onset   • Breast cancer Cousin 55        50's   • Arthritis Mother    • Heart attack Mother    • Cancer Father         prostate   • Heart attack Father    • Hypertension Father    • Stroke Father    • Diabetes Sister         type 1   • Diabetes Brother         type 1   • Stroke Paternal Grandmother    • Ovarian cancer Neg Hx       Social History     Socioeconomic History   • Marital status:    Tobacco Use   •  Smoking status: Former Smoker     Packs/day: 0.75     Years: 12.00     Pack years: 9.00     Types: Cigarettes     Quit date:      Years since quittin.2   • Smokeless tobacco: Never Used   Vaping Use   • Vaping Use: Never used   Substance and Sexual Activity   • Alcohol use: Yes     Comment: on occasion   • Drug use: No   • Sexual activity: Yes     Partners: Male      Allergies   Allergen Reactions   • Lisinopril Cough       Review of Systems   Constitutional: Positive for malaise/fatigue and weight loss. Negative for diaphoresis and fever.   Respiratory: Positive for cough. Negative for shortness of breath.    Cardiovascular: Positive for palpitations. Negative for chest pain, syncope and near-syncope.   Gastrointestinal: Positive for abdominal pain, anorexia (yesterday) and flatus. Negative for constipation, diarrhea, hematochezia, melena, nausea and vomiting.   Genitourinary: Negative for dysuria, frequency and hematuria.   Musculoskeletal: Negative for arthralgias and myalgias.   Neurological: Negative for dizziness, weakness, numbness and headaches.   Psychiatric/Behavioral: The patient is not nervous/anxious.        Objective   Physical Exam  Vitals and nursing note reviewed.   Constitutional:       Appearance: She is well-developed.   HENT:      Head: Normocephalic.      Right Ear: External ear normal.      Left Ear: External ear normal.      Nose: Nose normal.   Eyes:      General: Lids are normal.      Conjunctiva/sclera: Conjunctivae normal.      Pupils: Pupils are equal, round, and reactive to light.   Neck:      Thyroid: No thyroid mass or thyromegaly.      Vascular: No carotid bruit.      Trachea: Trachea normal.   Cardiovascular:      Rate and Rhythm: Normal rate and regular rhythm.      Heart sounds: No murmur heard.  Pulmonary:      Effort: Pulmonary effort is normal. No respiratory distress.      Breath sounds: Normal breath sounds. No decreased breath sounds, wheezing, rhonchi or rales.    Chest:      Chest wall: No tenderness.   Abdominal:      General: Bowel sounds are normal.      Palpations: Abdomen is soft. There is no hepatomegaly or splenomegaly.      Tenderness: There is abdominal tenderness in the left upper quadrant and left lower quadrant. There is no guarding or rebound.      Hernia: No hernia is present.      Comments: Pain mostly LLQ   Musculoskeletal:         General: Normal range of motion.      Cervical back: Normal range of motion and neck supple.   Skin:     General: Skin is warm and dry.   Neurological:      Mental Status: She is alert and oriented to person, place, and time.   Psychiatric:         Behavior: Behavior normal.           Assessment/Plan   Diagnoses and all orders for this visit:    1. Left lower quadrant abdominal pain (Primary)  -     POC Urinalysis Dipstick, Automated  -     CT Abdomen Pelvis Without Contrast; Future  -     levoFLOXacin (Levaquin) 500 MG tablet; Take 1 tablet by mouth Daily.  Dispense: 7 tablet; Refill: 0  -     metroNIDAZOLE (Flagyl) 500 MG tablet; Take 1 tablet by mouth 3 (Three) Times a Day.  Dispense: 30 tablet; Refill: 0  -     CBC & Differential; Future  -     Comprehensive Metabolic Panel; Future    2. Heart palpitations  -     ECG 12 Lead  -     Holter monitor - 48 hour; Future      Suspect diverticulitis.  Will start antibiotics.  To ER if abdominal symptoms worsen,or  if pulse stays high more than a couple of minutes, she has other symptoms.  Someone else to drive if she does not need to call 911.             Current Outpatient Medications:   •  cetirizine (ZyrTEC) 10 MG tablet, Take 10 mg by mouth Daily., Disp: , Rfl:   •  Cholecalciferol (VITAMIN D-3 PO), Take 2,000 Units by mouth Daily., Disp: , Rfl:   •  losartan (COZAAR) 100 MG tablet, Take 1 tablet by mouth Daily., Disp: 90 tablet, Rfl: 1  •  metoprolol succinate XL (Toprol XL) 50 MG 24 hr tablet, Take 1 tablet by mouth Daily., Disp: 90 tablet, Rfl: 1  •  levoFLOXacin (Levaquin)  500 MG tablet, Take 1 tablet by mouth Daily., Disp: 7 tablet, Rfl: 0  •  metroNIDAZOLE (Flagyl) 500 MG tablet, Take 1 tablet by mouth 3 (Three) Times a Day., Disp: 30 tablet, Rfl: 0    Return in about 1 week (around 4/13/2022).       ECG 12 Lead    Date/Time: 4/6/2022 5:17 PM  Performed by: Fallon Cope MD  Authorized by: Fallon Cope MD   Comparison: compared with previous ECG from 7/31/2020  Similar to previous ECG  Rhythm: sinus rhythm  Rate: normal  BPM: 64  Conduction: conduction normal  ST Segments: ST segments normal  T Waves: T waves normal  QRS axis: normal (P, R, T: 19, 39, 26)  Other: no other findings    Clinical impression: normal ECG  Comments: [place] int 160 ms  QRS dur 96 ms  QT/QTc 401/410 ms          Recent Results (from the past 168 hour(s))   POC Urinalysis Dipstick, Automated    Collection Time: 04/06/22  5:49 PM    Specimen: Urine   Result Value Ref Range    Color Yellow Yellow, Straw, Dark Yellow, Sushila    Clarity, UA Clear Clear    Specific Gravity  1.015 1.005 - 1.030    pH, Urine 6.0 5.0 - 8.0    Leukocytes Negative Negative    Nitrite, UA Negative Negative    Protein, POC Negative Negative mg/dL    Glucose, UA Negative Negative, 1000 mg/dL (3+) mg/dL    Ketones, UA Negative Negative    Urobilinogen, UA Normal Normal    Bilirubin Negative Negative    Blood, UA Negative Negative    Lot Number [id number]     Expiration Date 10/21/2023

## 2022-04-07 ENCOUNTER — TELEPHONE (OUTPATIENT)
Dept: INTERNAL MEDICINE | Facility: CLINIC | Age: 57
End: 2022-04-07

## 2022-04-07 ENCOUNTER — HOSPITAL ENCOUNTER (OUTPATIENT)
Dept: CT IMAGING | Facility: HOSPITAL | Age: 57
Discharge: HOME | End: 2022-04-07

## 2022-04-07 ENCOUNTER — LAB (OUTPATIENT)
Dept: LAB | Facility: HOSPITAL | Age: 57
End: 2022-04-07

## 2022-04-07 DIAGNOSIS — R10.32 LEFT LOWER QUADRANT ABDOMINAL PAIN: ICD-10-CM

## 2022-04-07 LAB
ALBUMIN SERPL-MCNC: 4.3 G/DL (ref 3.5–5.2)
ALBUMIN/GLOB SERPL: 1.7 G/DL
ALP SERPL-CCNC: 107 U/L (ref 39–117)
ALT SERPL W P-5'-P-CCNC: 24 U/L (ref 1–33)
ANION GAP SERPL CALCULATED.3IONS-SCNC: 14 MMOL/L (ref 5–15)
AST SERPL-CCNC: 19 U/L (ref 1–32)
BASOPHILS # BLD AUTO: 0.04 10*3/MM3 (ref 0–0.2)
BASOPHILS NFR BLD AUTO: 0.7 % (ref 0–1.5)
BILIRUB SERPL-MCNC: 0.5 MG/DL (ref 0–1.2)
BUN SERPL-MCNC: 11 MG/DL (ref 6–20)
BUN/CREAT SERPL: 15.9 (ref 7–25)
CALCIUM SPEC-SCNC: 9.7 MG/DL (ref 8.6–10.5)
CHLORIDE SERPL-SCNC: 102 MMOL/L (ref 98–107)
CO2 SERPL-SCNC: 27 MMOL/L (ref 22–29)
CREAT SERPL-MCNC: 0.69 MG/DL (ref 0.57–1)
DEPRECATED RDW RBC AUTO: 40.1 FL (ref 37–54)
EGFRCR SERPLBLD CKD-EPI 2021: 100.7 ML/MIN/1.73
EOSINOPHIL # BLD AUTO: 0.18 10*3/MM3 (ref 0–0.4)
EOSINOPHIL NFR BLD AUTO: 3.2 % (ref 0.3–6.2)
ERYTHROCYTE [DISTWIDTH] IN BLOOD BY AUTOMATED COUNT: 13 % (ref 12.3–15.4)
GLOBULIN UR ELPH-MCNC: 2.6 GM/DL
GLUCOSE SERPL-MCNC: 90 MG/DL (ref 65–99)
HCT VFR BLD AUTO: 41.6 % (ref 34–46.6)
HGB BLD-MCNC: 13.8 G/DL (ref 12–15.9)
IMM GRANULOCYTES # BLD AUTO: 0.02 10*3/MM3 (ref 0–0.05)
IMM GRANULOCYTES NFR BLD AUTO: 0.4 % (ref 0–0.5)
LYMPHOCYTES # BLD AUTO: 0.89 10*3/MM3 (ref 0.7–3.1)
LYMPHOCYTES NFR BLD AUTO: 15.8 % (ref 19.6–45.3)
MCH RBC QN AUTO: 28.4 PG (ref 26.6–33)
MCHC RBC AUTO-ENTMCNC: 33.2 G/DL (ref 31.5–35.7)
MCV RBC AUTO: 85.6 FL (ref 79–97)
MONOCYTES # BLD AUTO: 0.57 10*3/MM3 (ref 0.1–0.9)
MONOCYTES NFR BLD AUTO: 10.1 % (ref 5–12)
NEUTROPHILS NFR BLD AUTO: 3.95 10*3/MM3 (ref 1.7–7)
NEUTROPHILS NFR BLD AUTO: 69.8 % (ref 42.7–76)
NRBC BLD AUTO-RTO: 0 /100 WBC (ref 0–0.2)
PLATELET # BLD AUTO: 193 10*3/MM3 (ref 140–450)
PMV BLD AUTO: 11 FL (ref 6–12)
POTASSIUM SERPL-SCNC: 4.1 MMOL/L (ref 3.5–5.2)
PROT SERPL-MCNC: 6.9 G/DL (ref 6–8.5)
RBC # BLD AUTO: 4.86 10*6/MM3 (ref 3.77–5.28)
SODIUM SERPL-SCNC: 143 MMOL/L (ref 136–145)
WBC NRBC COR # BLD: 5.65 10*3/MM3 (ref 3.4–10.8)

## 2022-04-07 PROCEDURE — 85025 COMPLETE CBC W/AUTO DIFF WBC: CPT

## 2022-04-07 PROCEDURE — 74176 CT ABD & PELVIS W/O CONTRAST: CPT

## 2022-04-07 PROCEDURE — 80053 COMPREHEN METABOLIC PANEL: CPT

## 2022-04-07 NOTE — TELEPHONE ENCOUNTER
Dr. Bush, radiologist from [facility] called to inform Dr. Cope that this patient has acute diverticulitis and no define abscess was present

## 2022-04-14 ENCOUNTER — OFFICE VISIT (OUTPATIENT)
Dept: INTERNAL MEDICINE | Facility: CLINIC | Age: 57
End: 2022-04-14

## 2022-04-14 VITALS
HEIGHT: 66 IN | BODY MASS INDEX: 41.3 KG/M2 | TEMPERATURE: 96.9 F | WEIGHT: 257 LBS | OXYGEN SATURATION: 94 % | DIASTOLIC BLOOD PRESSURE: 80 MMHG | SYSTOLIC BLOOD PRESSURE: 122 MMHG | HEART RATE: 53 BPM

## 2022-04-14 DIAGNOSIS — K57.32 DIVERTICULITIS OF LARGE INTESTINE WITHOUT PERFORATION OR ABSCESS WITHOUT BLEEDING: Primary | ICD-10-CM

## 2022-04-14 DIAGNOSIS — E04.1 LEFT THYROID NODULE: ICD-10-CM

## 2022-04-14 DIAGNOSIS — Z86.79 HISTORY OF PALPITATIONS IN ADULTHOOD: ICD-10-CM

## 2022-04-14 PROCEDURE — 99214 OFFICE O/P EST MOD 30 MIN: CPT | Performed by: FAMILY MEDICINE

## 2022-04-14 NOTE — PROGRESS NOTES
"Subjective   Tiarra Becerra is a 57 y.o. female.     Chief Complaint   Patient presents with   • Abdominal Pain   • Palpitations     1 week follow up on abdominal pain and palpitation         Visit Vitals  /80   Pulse 53   Temp 96.9 °F (36.1 °C)   Ht 167.6 cm (66\")   Wt 117 kg (257 lb)   LMP 01/02/2018   SpO2 94%   BMI 41.48 kg/m²       Wt Readings from Last 3 Encounters:   04/14/22 117 kg (257 lb)   04/06/22 117 kg (259 lb)   03/03/22 119 kg (262 lb)         Abdominal Pain  This is a new problem. The current episode started 1 to 4 weeks ago. The problem occurs constantly. The problem has been rapidly improving. The pain is located in the LLQ. The pain is mild. The quality of the pain is aching. The abdominal pain does not radiate. Pertinent negatives include no anorexia, arthralgias, belching, constipation, diarrhea, dysuria, fever, flatus, frequency, headaches, hematochezia, hematuria, melena, myalgias, nausea, vomiting or weight loss. Nothing aggravates the pain. Relieved by: antibiotics. She has tried antibiotics for the symptoms. The treatment provided significant relief. Prior diagnostic workup includes CT scan. Her past medical history is significant for gallstones.      Pt here for f/u of diverticulitis. Pt has finished her Levaquin and has a few days left on metronidazole. Pt reports that pain has improved, slight tenderness to palpations.     Pt finished her Holter monitor 2 days ago. Report not back. Pt has not had any palpitations while wearing Holter.  Pt has hx of thyroid nodule that has had previous US and FNA and was benign. No recent scan.   The following portions of the patient's history were reviewed and updated as appropriate: allergies, current medications, past family history, past medical history, past social history, past surgical history and problem list.    Past Medical History:   Diagnosis Date   • Endometrial cancer (HCC) 11/28/2018   • Essential hypertension 8/6/2018   • Hx of " radiation therapy       Past Surgical History:   Procedure Laterality Date   • COLONOSCOPY  2019    Dr Giraldo, tics, internal hemorrhoids   • D & C HYSTEROSCOPY MYOSURE  2018   • OOPHORECTOMY     • TOTAL LAPAROSCOPIC HYSTERECTOMY WITH DAVINCI ROBOT  2018    laproscopic with BSO, lymph node dissection, left parametrectomy, lysis ureters, Dr Jaffe. Cancer   • TOTAL LAPAROSCOPIC HYSTERECTOMY, LAPAROSCOPIC NODE DISSECTION N/A 2018    Procedure: TOTAL LAPAROSCOPIC HYSTERECTOMY WITH DAVINCI ROBOT, BILATERAL SALPINGO OOPHORECTOMY, WITH RADICAL LEFT PARAMETRECTOMY, BILATERAL UTERETAL LYSIS, PELVIC LYMPH NODE DISSECTION;  Surgeon: April Jaffe MD;  Location: [facility];  Service: DaVinci   • TUBAL ABDOMINAL LIGATION        Family History   Problem Relation Age of Onset   • Breast cancer Cousin 55        50's   • Arthritis Mother    • Heart attack Mother    • Cancer Father         prostate   • Heart attack Father    • Hypertension Father    • Stroke Father    • Diabetes Sister         type 1   • Diabetes Brother         type 1   • Stroke Paternal Grandmother    • Ovarian cancer Neg Hx       Social History     Socioeconomic History   • Marital status:    Tobacco Use   • Smoking status: Former Smoker     Packs/day: 0.75     Years: 12.00     Pack years: 9.00     Types: Cigarettes     Quit date:      Years since quittin.3   • Smokeless tobacco: Never Used   Vaping Use   • Vaping Use: Never used   Substance and Sexual Activity   • Alcohol use: Yes     Comment: on occasion   • Drug use: No   • Sexual activity: Yes     Partners: Male      Allergies   Allergen Reactions   • Lisinopril Cough       Review of Systems   Constitutional: Negative for fever and weight loss.   Gastrointestinal: Positive for abdominal pain. Negative for anorexia, constipation, diarrhea, flatus, hematochezia, melena, nausea and vomiting.   Genitourinary: Negative for dysuria, frequency and hematuria.    Musculoskeletal: Negative for arthralgias and myalgias.   Neurological: Negative for headaches.       Objective   Physical Exam  Vitals and nursing note reviewed.   Constitutional:       Appearance: She is well-developed.   HENT:      Head: Normocephalic.      Right Ear: External ear normal.      Left Ear: External ear normal.      Nose: Nose normal.   Eyes:      General: Lids are normal.      Conjunctiva/sclera: Conjunctivae normal.      Pupils: Pupils are equal, round, and reactive to light.   Neck:      Thyroid: Thyroid mass present. No thyromegaly.      Vascular: No carotid bruit.      Trachea: Trachea normal.     Cardiovascular:      Rate and Rhythm: Normal rate and regular rhythm.      Heart sounds: Murmur heard.    Systolic murmur is present with a grade of 1/6.  Pulmonary:      Effort: Pulmonary effort is normal. No respiratory distress.      Breath sounds: Normal breath sounds. No decreased breath sounds, wheezing, rhonchi or rales.   Chest:      Chest wall: No tenderness.   Abdominal:      General: Bowel sounds are normal.      Palpations: Abdomen is soft. There is no hepatomegaly or splenomegaly.      Tenderness: There is abdominal tenderness (very mild) in the left lower quadrant.   Musculoskeletal:         General: Normal range of motion.      Cervical back: Normal range of motion and neck supple.   Skin:     General: Skin is warm and dry.   Neurological:      Mental Status: She is alert and oriented to person, place, and time.   Psychiatric:         Behavior: Behavior normal.         Assessment/Plan   Diagnoses and all orders for this visit:    1. Diverticulitis of large intestine without perforation or abscess without bleeding (Primary)  -     Ambulatory Referral to Gastroenterology    2. Left thyroid nodule  -     US Thyroid; Future    3. History of palpitations in adulthood      Marked improvement of diverticulitis.   Finish antibiotics.  F/u with GI  Handout on diverticulitis.        Reviewed CT  with pt.     Current Outpatient Medications:   •  cetirizine (ZyrTEC) 10 MG tablet, Take 10 mg by mouth Daily., Disp: , Rfl:   •  Cholecalciferol (VITAMIN D-3 PO), Take 2,000 Units by mouth Daily., Disp: , Rfl:   •  losartan (COZAAR) 100 MG tablet, Take 1 tablet by mouth Daily., Disp: 90 tablet, Rfl: 1  •  metoprolol succinate XL (Toprol XL) 50 MG 24 hr tablet, Take 1 tablet by mouth Daily., Disp: 90 tablet, Rfl: 1  •  metroNIDAZOLE (Flagyl) 500 MG tablet, Take 1 tablet by mouth 3 (Three) Times a Day., Disp: 30 tablet, Rfl: 0    Return in about 5 months (around 9/7/2022), or if symptoms worsen or fail to improve, for Next scheduled follow up, Recheck.       Narrative & Impression      DATE OF EXAM:  4/7/2022 10:00 AM     PROCEDURE:  CT ABDOMEN PELVIS WO CONTRAST-     INDICATIONS:  Abdominal abscess/infection suspected     COMPARISON:  June 12, 2019     TECHNIQUE:  Routine transaxial slices were obtained through the abdomen and pelvis  without the administration of intravenous contrast. Reconstructed  coronal and sagittal images were also obtained. Automated exposure  control and iterative construction methods were used.       FINDINGS:  There is hepatic steatosis. There is no definite intrahepatic biliary  dilatation. There are gallstones within gallbladder without changes of  acute cholecystitis. There is no abnormality of the spleen, pancreas or  kidneys.     There is stranding in the fat around the area of the more distal  descending colon. There is no free air. No well-defined fluid collection  to suggest an abscess is apparent. The overall findings are compatible  with acute diverticulitis.     The bladder is contracted. The patient has had a hysterectomy.     There are degenerative changes in lower lumbar spine. There is severe  central canal stenosis particularly at L4-5 and L5-S1.      IMPRESSION:  1.  There are findings involving the distal descending colon compatible  with acute diverticulitis.  2.   Cholelithiasis without CT findings of acute cholecystitis.  3.  Hepatic steatosis.  4.  Degenerative changes lower lumbar spine. There are severe central  canal stenosis in the lower lumbar area particularly at L4-5 and L5-S1.     Report called to medical provider's office with results.     This report was finalized on 4/7/2022 10:34 AM by Jaime De Santiago MD.

## 2022-04-21 ENCOUNTER — TELEPHONE (OUTPATIENT)
Dept: INTERNAL MEDICINE | Facility: CLINIC | Age: 57
End: 2022-04-21

## 2022-04-21 NOTE — TELEPHONE ENCOUNTER
----- Message from Fallon RAY MD sent at 4/18/2022  6:25 PM EDT -----  Holter monitor showed rare episode of atrial tachycardia of 5 beats.  There was also a 6 beat run of ventricular tachycardia.  There is a few ectopic beats (premature beats).  Are these palpitations persisting?  If so a cardiology referral will be made.

## 2022-04-26 ENCOUNTER — PATIENT MESSAGE (OUTPATIENT)
Dept: INTERNAL MEDICINE | Facility: CLINIC | Age: 57
End: 2022-04-26

## 2022-04-26 DIAGNOSIS — R00.2 INTERMITTENT PALPITATIONS: Primary | ICD-10-CM

## 2022-04-26 DIAGNOSIS — R94.31 ABNORMAL HOLTER MONITOR FINDING: ICD-10-CM

## 2022-04-27 ENCOUNTER — OFFICE VISIT (OUTPATIENT)
Dept: GASTROENTEROLOGY | Facility: CLINIC | Age: 57
End: 2022-04-27

## 2022-04-27 VITALS — WEIGHT: 254.8 LBS | TEMPERATURE: 97.1 F | BODY MASS INDEX: 40.95 KG/M2 | HEIGHT: 66 IN

## 2022-04-27 DIAGNOSIS — K57.92 DIVERTICULITIS: Primary | ICD-10-CM

## 2022-04-27 PROCEDURE — 99214 OFFICE O/P EST MOD 30 MIN: CPT | Performed by: NURSE PRACTITIONER

## 2022-04-27 NOTE — PROGRESS NOTES
GASTROENTEROLOGY OFFICE NOTE  Tiarra Becerra  [id number]  1965    CARE TEAM  Patient Care Team:  Fallon Cope MD as PCP - General (Family Medicine)  Ammy Davis DO as Consulting Physician (Obstetrics and Gynecology)  April Jaffe MD as Referring Physician (Gynecologic Oncology)  Brennan Vincent MD as Consulting Physician (Radiation Oncology)  Nanette Gandhi APRN as Nurse Practitioner (Gynecologic Oncology)  Austin Pantoja MD as Consulting Physician (Otolaryngology)  Estuardo Giraldo MD as Consulting Physician (Gastroenterology)    Referring Provider: Fallon Cope MD    Chief Complaint   Patient presents with   • Diverticulitis        HISTORY OF PRESENT ILLNESS:  Patient presents today after having a recent diagnosis of CT confirmed diverticulitis, April 6, 2022.  She was treated with Levaquin and Flagyl and her pain resolved.    Her last colonoscopy was in November 2019 by Dr. Giraldo.  Internal hemorrhoids and diverticulosis of the sigmoid colon were noted.  She has a family history of colon polyps in her father.    PAST MEDICAL HISTORY  Past Medical History:   Diagnosis Date   • Diverticulitis of colon    • Endometrial cancer (HCC) 11/28/2018   • Essential hypertension 08/06/2018   • Hx of radiation therapy         PAST SURGICAL HISTORY  Past Surgical History:   Procedure Laterality Date   • COLONOSCOPY  11/25/2019    Dr Giraldo, tics, internal hemorrhoids   • D & C HYSTEROSCOPY MYOSURE  11/05/2018   • OOPHORECTOMY     • TOTAL LAPAROSCOPIC HYSTERECTOMY WITH DAVINCI ROBOT  11/30/2018    laproscopic with BSO, lymph node dissection, left parametrectomy, lysis ureters, Dr Jaffe. Cancer   • TOTAL LAPAROSCOPIC HYSTERECTOMY, LAPAROSCOPIC NODE DISSECTION N/A 11/30/2018    Procedure: TOTAL LAPAROSCOPIC HYSTERECTOMY WITH DAVINCI ROBOT, BILATERAL SALPINGO OOPHORECTOMY, WITH RADICAL LEFT PARAMETRECTOMY, BILATERAL UTERETAL LYSIS, PELVIC LYMPH NODE DISSECTION;  Surgeon:  "April Jaffe MD;  [facility];  Service: DaVinci   • TUBAL ABDOMINAL LIGATION          MEDICATIONS:    Current Outpatient Medications:   •  cetirizine (ZyrTEC) 10 MG tablet, Take 10 mg by mouth Daily., Disp: , Rfl:   •  Cholecalciferol (VITAMIN D-3 PO), Take 2,000 Units by mouth Daily., Disp: , Rfl:   •  losartan (COZAAR) 100 MG tablet, Take 1 tablet by mouth Daily., Disp: 90 tablet, Rfl: 1  •  metoprolol succinate XL (Toprol XL) 50 MG 24 hr tablet, Take 1 tablet by mouth Daily., Disp: 90 tablet, Rfl: 1  •  metroNIDAZOLE (Flagyl) 500 MG tablet, Take 1 tablet by mouth 3 (Three) Times a Day., Disp: 30 tablet, Rfl: 0    ALLERGIES  Allergies   Allergen Reactions   • Lisinopril Cough       FAMILY HISTORY:  Family History   Problem Relation Age of Onset   • Breast cancer Cousin 55        50's   • Arthritis Mother    • Heart attack Mother    • Cancer Father         prostate   • Heart attack Father    • Hypertension Father    • Stroke Father    • Diabetes Sister         type 1   • Diabetes Brother         type 1   • Stroke Paternal Grandmother    • Ovarian cancer Neg Hx        SOCIAL HISTORY  Social History     Socioeconomic History   • Marital status:    Tobacco Use   • Smoking status: Former Smoker     Packs/day: 0.75     Years: 12.00     Pack years: 9.00     Types: Cigarettes     Quit date:      Years since quittin.3   • Smokeless tobacco: Never Used   Vaping Use   • Vaping Use: Never used   Substance and Sexual Activity   • Alcohol use: Yes     Comment: on occasion   • Drug use: No   • Sexual activity: Yes     Partners: Male           PHYSICAL EXAM   Temp 97.1 °F (36.2 °C) (Temporal)   Ht 167.6 cm (66\")   Wt 116 kg (254 lb 12.8 oz)   LMP 2018   BMI 41.13 kg/m²   Alert and oriented x3  Head normocephalic  Lungs clear, breath sounds regular  Abdomen soft, nondistended, nontender    Results Review:  CT ABDOMEN PELVIS WO CONTRAST-     INDICATIONS:  Abdominal abscess/infection " suspected     COMPARISON:  June 12, 2019     TECHNIQUE:  Routine transaxial slices were obtained through the abdomen and pelvis  without the administration of intravenous contrast. Reconstructed  coronal and sagittal images were also obtained. Automated exposure  control and iterative construction methods were used.       FINDINGS:  There is hepatic steatosis. There is no definite intrahepatic biliary  dilatation. There are gallstones within gallbladder without changes of  acute cholecystitis. There is no abnormality of the spleen, pancreas or  kidneys.     There is stranding in the fat around the area of the more distal  descending colon. There is no free air. No well-defined fluid collection  to suggest an abscess is apparent. The overall findings are compatible  with acute diverticulitis.     The bladder is contracted. The patient has had a hysterectomy.     There are degenerative changes in lower lumbar spine. There is severe  central canal stenosis particularly at L4-5 and L5-S1.      IMPRESSION:  1.  There are findings involving the distal descending colon compatible  with acute diverticulitis.  2.  Cholelithiasis without CT findings of acute cholecystitis.  3.  Hepatic steatosis.  4.  Degenerative changes lower lumbar spine. There are severe central  canal stenosis in the lower lumbar area particularly at L4-5 and L5-S1.     Report called to medical provider's office with results.     This report was finalized on 4/7/2022 10:34 AM by Jaime De Santiago MD.         ASSESSMENT / PLAN  1.  Diverticulitis-now resolved  - Colonoscopy, no sooner than 8 weeks from the time of resolution of diverticulitis    Return for Colonoscopy.    I discussed the patients findings and my recommendations with patient    LIANNA Chavez

## 2022-05-09 NOTE — TELEPHONE ENCOUNTER
From: KOKO OLIVO  To: Tiarra Norislouise Becerra  Sent: 4/26/2022 8:25 AM EDT  Subject: Holtor Monitor results    Holter monitor showed rare episode of atrial tachycardia of 5 beats. There was also a 6 beat run of ventricular tachycardia. There is a few ectopic beats (premature beats). Are these palpitations persisting? If so a cardiology referral will be made.

## 2022-05-12 ENCOUNTER — HOSPITAL ENCOUNTER (OUTPATIENT)
Dept: ULTRASOUND IMAGING | Facility: HOSPITAL | Age: 57
Discharge: HOME | End: 2022-05-12

## 2022-05-31 ENCOUNTER — OFFICE VISIT (OUTPATIENT)
Dept: CARDIOLOGY | Facility: CLINIC | Age: 57
End: 2022-05-31

## 2022-05-31 VITALS
WEIGHT: 248 LBS | DIASTOLIC BLOOD PRESSURE: 80 MMHG | OXYGEN SATURATION: 98 % | HEART RATE: 57 BPM | SYSTOLIC BLOOD PRESSURE: 118 MMHG | BODY MASS INDEX: 39.86 KG/M2 | HEIGHT: 66 IN

## 2022-05-31 DIAGNOSIS — R00.2 PALPITATIONS: ICD-10-CM

## 2022-05-31 DIAGNOSIS — I10 ESSENTIAL HYPERTENSION: Primary | ICD-10-CM

## 2022-05-31 PROCEDURE — 99203 OFFICE O/P NEW LOW 30 MIN: CPT | Performed by: PHYSICIAN ASSISTANT

## 2022-05-31 PROCEDURE — 93000 ELECTROCARDIOGRAM COMPLETE: CPT | Performed by: PHYSICIAN ASSISTANT

## 2022-05-31 NOTE — PROGRESS NOTES
[facility]  INITIAL OFFICE CONSULT      Tiarra Becerra  1965  PCP: Fallon Cope MD    SUBJECTIVE:   Tiarra Becerra is a 57 y.o. 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  The patient reports she began to have palpitations several years ago that seem to be worse more recently.  She describes as a skipping or fluttering sensation.  They have not been associate with chest pain or chest tightness.  They have not been associated with dizziness near syncope or syncope.  They do wax and wane some months are worse than others some days are worse than others.  She has been metoprolol for some time for blood pressure she thinks this may help.  She has not had any heart failure symptoms or chest pain suggesting angina pectoris.  She exercises on a regular basis she has a bike that she has been riding she is increasing her mileage up to 7 miles per day.  While doing this she is having no symptoms.  Her goal is to ultimately try to ride the [employer].      Cardiac PMH: (Old records have been reviewed and summarized below)  1. Palpitatons  a. 48 hour holter - 6 beats of NSVT and short run fo Atrial tachycardia. Rate Pacs and PVC's 4/7/2022  2. HTN: Controlled on metoprolol and Zestril therapy  3. Diverticulitis  4. Family history of CAD: Father MI 50's   5. Thyroid nodule  6. Cholelithiasis   7. Dyslipidemia: Patient currently not on a statin she is wants to try diet first.        Past Medical History, Past Surgical History, Family history, Social History, and Medications were all reviewed with the patient today and updated as necessary.     Current Outpatient  Medications   Medication Sig Dispense Refill   • cetirizine (ZyrTEC) 10 MG tablet Take 10 mg by mouth Daily.     • Cholecalciferol (VITAMIN D-3 PO) Take 2,000 Units by mouth Daily.     • losartan (COZAAR) 100 MG tablet Take 1 tablet by mouth Daily. 90 tablet 1   • metoprolol succinate XL (Toprol XL) 50 MG 24 hr tablet Take 1 tablet by mouth Daily. 90 tablet 1   • metroNIDAZOLE (Flagyl) 500 MG tablet Take 1 tablet by mouth 3 (Three) Times a Day. 30 tablet 0     No current facility-administered medications for this visit.     Allergies   Allergen Reactions   • Lisinopril Cough         Past Medical History:   Diagnosis Date   • Diverticulitis of colon    • Endometrial cancer (HCC) 11/28/2018   • Essential hypertension 08/06/2018   • Hx of radiation therapy      Past Surgical History:   Procedure Laterality Date   • COLONOSCOPY  11/25/2019    Dr Giraldo, tics, internal hemorrhoids   • D & C HYSTEROSCOPY MYOSURE  11/05/2018   • OOPHORECTOMY     • TOTAL LAPAROSCOPIC HYSTERECTOMY WITH DAVINCI ROBOT  11/30/2018    laproscopic with BSO, lymph node dissection, left parametrectomy, lysis ureters, Dr Jaffe. Cancer   • TOTAL LAPAROSCOPIC HYSTERECTOMY, LAPAROSCOPIC NODE DISSECTION N/A 11/30/2018    Procedure: TOTAL LAPAROSCOPIC HYSTERECTOMY WITH DAVINCI ROBOT, BILATERAL SALPINGO OOPHORECTOMY, WITH RADICAL LEFT PARAMETRECTOMY, BILATERAL UTERETAL LYSIS, PELVIC LYMPH NODE DISSECTION;  Surgeon: April Jaffe MD;  Location: [facility];  Service: DaVinci   • TUBAL ABDOMINAL LIGATION  2006     Family History   Problem Relation Age of Onset   • Arthritis Mother    • Heart attack Mother    • Cancer Father         prostate   • Heart attack Father    • Hypertension Father    • Stroke Father    • Diabetes Sister         type 1   • Diabetes Brother         type 1   • Stroke Paternal Grandmother    • Breast cancer Cousin 55        50's   • Ovarian cancer Neg Hx      Social History     Tobacco Use   • Smoking status: Former Smoker      "Packs/day: 0.75     Years: 12.00     Pack years: 9.00     Types: Cigarettes     Quit date:      Years since quittin.4   • Smokeless tobacco: Never Used   Substance Use Topics   • Alcohol use: Yes     Comment: on occasion       ROS:  Review of Symptoms:  General: no recent weight loss/gain, weakness or fatigue  Skin: no rashes, lumps, or other skin changes  HEENT: no dizziness, lightheadedness, or vision changes  Respiratory: no cough or hemoptysis  Cardiovascular: + palpitations, and tachycardia  Gastrointestinal: no black/tarry stools or diarrhea  Urinary: no change in frequency or urgency  Peripheral Vascular: no claudication or leg cramps  Musculoskeletal: no muscle or joint pain/stiffness  Psychiatric: no depression or excessive stress  Neurological: no sensory or motor loss, no syncope  Hematologic: no anemia, easy bruising or bleeding  Endocrine: no thyroid problems, nor heat or cold intolerance         PHYSICAL EXAM:   /80 (BP Location: Right arm, Patient Position: Sitting, Cuff Size: Adult)   Pulse 57   Ht 167.6 cm (66\")   Wt 112 kg (248 lb)   LMP 2018   SpO2 98%   BMI 40.03 kg/m²      Wt Readings from Last 5 Encounters:   22 112 kg (248 lb)   22 116 kg (254 lb 12.8 oz)   22 117 kg (257 lb)   22 117 kg (259 lb)   22 119 kg (262 lb)     BP Readings from Last 5 Encounters:   22 118/80   22 122/80   22 118/76   22 116/80   22 117/80       General-Well Nourished, Well developed  Eyes - PERRLA  Neck- supple, No mass  CV- regular rate and rhythm, no MRG  Lung- clear bilaterally  Abd- soft, +BS  Musc/skel - Norm strength and range of motion  Skin- warm and dry  Neuro - Alert & Oriented x 3, appropriate mood.    Patient's external notes were reviewed.  Independent interpretation of test performed by another physician in facility were reviewed.  Outside laboratory data was also reviewed.    Medical problems and test results were " reviewed with the patient today.     Results for orders placed or performed in visit on 04/07/22   Comprehensive Metabolic Panel    Specimen: Blood   Result Value Ref Range    Glucose 90 65 - 99 mg/dL    BUN 11 6 - 20 mg/dL    Creatinine 0.69 0.57 - 1.00 mg/dL    Sodium 143 136 - 145 mmol/L    Potassium 4.1 3.5 - 5.2 mmol/L    Chloride 102 98 - 107 mmol/L    CO2 27.0 22.0 - 29.0 mmol/L    Calcium 9.7 8.6 - 10.5 mg/dL    Total Protein 6.9 6.0 - 8.5 g/dL    Albumin 4.30 3.50 - 5.20 g/dL    ALT (SGPT) 24 1 - 33 U/L    AST (SGOT) 19 1 - 32 U/L    Alkaline Phosphatase 107 39 - 117 U/L    Total Bilirubin 0.5 0.0 - 1.2 mg/dL    Globulin 2.6 gm/dL    A/G Ratio 1.7 g/dL    BUN/Creatinine Ratio 15.9 7.0 - 25.0    Anion Gap 14.0 5.0 - 15.0 mmol/L    eGFR 100.7 >60.0 mL/min/1.73   CBC Auto Differential    Specimen: Blood   Result Value Ref Range    WBC 5.65 3.40 - 10.80 10*3/mm3    RBC 4.86 3.77 - 5.28 10*6/mm3    Hemoglobin 13.8 12.0 - 15.9 g/dL    Hematocrit 41.6 34.0 - 46.6 %    MCV 85.6 79.0 - 97.0 fL    MCH 28.4 26.6 - 33.0 pg    MCHC 33.2 31.5 - 35.7 g/dL    RDW 13.0 12.3 - 15.4 %    RDW-SD 40.1 37.0 - 54.0 fl    MPV 11.0 6.0 - 12.0 fL    Platelets 193 140 - 450 10*3/mm3    Neutrophil % 69.8 42.7 - 76.0 %    Lymphocyte % 15.8 (L) 19.6 - 45.3 %    Monocyte % 10.1 5.0 - 12.0 %    Eosinophil % 3.2 0.3 - 6.2 %    Basophil % 0.7 0.0 - 1.5 %    Immature Grans % 0.4 0.0 - 0.5 %    Neutrophils, Absolute 3.95 1.70 - 7.00 10*3/mm3    Lymphocytes, Absolute 0.89 0.70 - 3.10 10*3/mm3    Monocytes, Absolute 0.57 0.10 - 0.90 10*3/mm3    Eosinophils, Absolute 0.18 0.00 - 0.40 10*3/mm3    Basophils, Absolute 0.04 0.00 - 0.20 10*3/mm3    Immature Grans, Absolute 0.02 0.00 - 0.05 10*3/mm3    nRBC 0.0 0.0 - 0.2 /100 WBC         Lab Results   Component Value Date    CHOL 222 (H) 03/03/2022    HDL 62 (H) 03/03/2022     (H) 03/03/2022    VLDL 33 03/03/2022       EKG:  (EKG/Tracing has been independently visualized by me and summarized  below)      ECG 12 Lead    Date/Time: 5/31/2022 5:11 PM  Performed by: Irvin Brandt PA  Authorized by: Irvin Brandt PA   Rhythm: sinus rhythm and sinus bradycardia  Rate: bradycardic  Conduction: conduction normal  ST Segments: ST segments normal  QRS axis: normal  Other: no other findings    Clinical impression: normal ECG            ASSESSMENT   1.  Palpitations: Recent 48-hour monitor revealing occasional atrial tachycardia, PACs PVCs 1 episode of asymptomatic NSVT.  2.  Hypertension: Controlled on metoprolol and Zestril therapy  3.  Dyslipidemia discussed the patient risk and benefits of taking statin therapy.  She like to consider a CT coronary calcium score test.  This is positive she will consider taking statin.  4.  Moderate obesity: She is working on exercising and dieting.      PLAN  · Echocardiogram regarding arrhythmias  · CT coronary calcium score test  · Continue to focus on risk factors for coronary disease such as diet exercise weight loss return to follow-up her office in 6 months or sooner if abnormal echo or change in symptoms.          Cardiology/Electrophysiology  05/31/22  10:13 EDT  Will Karly KHAN

## 2022-06-13 RX ORDER — SODIUM, POTASSIUM,MAG SULFATES 17.5-3.13G
2 SOLUTION, RECONSTITUTED, ORAL ORAL TAKE AS DIRECTED
Qty: 354 ML | Refills: 0 | Status: SHIPPED | OUTPATIENT
Start: 2022-06-13 | End: 2022-09-07

## 2022-06-27 ENCOUNTER — OUTSIDE FACILITY SERVICE (OUTPATIENT)
Dept: GASTROENTEROLOGY | Facility: CLINIC | Age: 57
End: 2022-06-27

## 2022-06-27 PROCEDURE — 45378 DIAGNOSTIC COLONOSCOPY: CPT | Performed by: INTERNAL MEDICINE

## 2022-07-27 ENCOUNTER — OFFICE VISIT (OUTPATIENT)
Dept: GYNECOLOGIC ONCOLOGY | Facility: CLINIC | Age: 57
End: 2022-07-27

## 2022-07-27 VITALS
HEART RATE: 49 BPM | SYSTOLIC BLOOD PRESSURE: 137 MMHG | TEMPERATURE: 97.3 F | WEIGHT: 242 LBS | RESPIRATION RATE: 16 BRPM | OXYGEN SATURATION: 99 % | BODY MASS INDEX: 39.06 KG/M2 | DIASTOLIC BLOOD PRESSURE: 67 MMHG

## 2022-07-27 DIAGNOSIS — Z85.42 HISTORY OF ENDOMETRIAL CANCER: Primary | ICD-10-CM

## 2022-07-27 PROCEDURE — 99212 OFFICE O/P EST SF 10 MIN: CPT | Performed by: NURSE PRACTITIONER

## 2022-07-27 NOTE — PROGRESS NOTES
GYN ONCOLOGY CANCER SURVEILLANCE FOLLOW-UP    Tiarra Becerra  [id number]  1965    Subjective   Chief Complaint: Uterine Cancer (No complaints)        History of present illness:     Tiarra Becerra is a 57 y.o. year old female who is here today for ongoing surveillance of Endometrial Cancer, see Cancer History. She is approaching 3.5 years out from completion of treatment. She reports she is feeling very well today and has no complaints. She denies vaginal bleeding, pelvic pain, concerning lesions, and changes in bowel or bladder function.        Cancer History:   Oncology/Hematology History   Endometrial cancer (HCC)   10/2018 Imaging    Patient presented to Dr. Davis for Annual exam with c/o new onset vaginal bleeding after amenorrhea x 8 months.   TVUS revealed large polyp vs fibroid in endocervical canal     11/5/2018 Procedure    ECC and Myosure by gynecologist. Pathology revealed grade 1 endometrioid adenocarcinoma in a background of complex atypical hyperplasia, suspicion for myoinvasion. Pt referred to Gyn Oncology     11/30/2018 Surgery    RTLH/BSO, radical left parametrectomy, left ureteral lysis, and pelvic lymph node dissection.   There was suspicion for concurrent cervical cancer at time of surgery, found to be multifocal grade 2-3 endometrioid carcinoma with extensive lower uterine segment involvement upon final pathology. Tumor invasive into over 50% of the myometrium (11/14 mm). Nodes negative. MSI testing normal. Stage IB grade 2-3     12/26/2018 Imaging    Post-op CT abdomen and pelvis negative     1/15/2019 - 2/18/2019 Radiation    Radiation OncologyTreatment Course:  Tiarra Becerra received 4500 cGy in 25 fractions to pelvis via External Beam Radiation - EBRT.     2/26/2019 - 2/28/2019 Radiation    Radiation OncologyTreatment Course:  Tiarra Becerra received 1200 cGy in 2 fractions to vagina via High Dose Radiation - HDR.     6/12/2019 Imaging    CT chest, abdomen, pelvis negative      12/2/2019 Survivorship    Survivorship Care Plan completed and discussed with patient.  Copy of Survivorship Care Plan provided to patient and primary care provider.           The current medication list and allergy list were reviewed and reconciled.     Past Medical History, Past Surgical History, Social History, Family History have been reviewed and are without significant changes except as mentioned.      Review of Systems   Constitutional: Negative.    Gastrointestinal: Negative.    Genitourinary: Negative.          Objective   Physical Exam  Vital Signs: /67   Pulse (!) 49   Temp 97.3 °F (36.3 °C)   Resp 16   Wt 110 kg (242 lb)   LMP 01/02/2018   SpO2 99%   BMI 39.06 kg/m²    Wt Readings from Last 10 Encounters:   07/27/22 110 kg (242 lb)   05/31/22 112 kg (248 lb)   04/27/22 116 kg (254 lb 12.8 oz)   04/14/22 117 kg (257 lb)   04/06/22 117 kg (259 lb)   03/03/22 119 kg (262 lb)   01/27/22 120 kg (265 lb)   09/02/21 117 kg (259 lb)   08/26/21 119 kg (261 lb 6.4 oz)   07/21/21 120 kg (265 lb 1.6 oz)       Vitals:    07/27/22 0916   PainSc: 0-No pain           General Appearance:  alert, cooperative, no apparent distress, appears stated age and obese by BMI criteria   Neurologic/Psychiatric: A&O x 3, gait steady, appropriate affect   HEENT:  Normocephalic, without obvious abnormality, mucous membranes moist   Abdomen:   Soft, non-tender, non-distended, no organomegaly and Exam limited d/t habitus.   Lymph nodes: No cervical, supraclavicular, inguinal adenopathy noted   Pelvic: External Genitalia  without lesions or skin changes  Vagina  is pink, moist, without lesions.   Vaginal Cuff  Female Vaginal Cuff: smooth, intact and without visible lesions  Uterus  surgically absent and no palpable masses  Ovaries  surgically absent bilaterally  Parametria  smooth  Rectovaginal  Female rectovaginal: deferred     ECOG score: 0             Result Review :   Last imaging study was CT 6/12/2019.     PHQ-9 Total  Score:      Procedure Note:            Assessment and Plan:    Diagnoses and all orders for this visit:    1. History of endometrial cancer (Primary)          There is no evidence of disease upon today's exam. Continue every 6 month cancer surveillance until the 5 year bernadine. She is understanding to call with any changes in pelvic symptoms or general GYN concerns at any time between regularly scheduled visits.       Pain assessment was performed today as a part of patient’s care.  For patients with pain related to surgery, gynecologic malignancy or cancer treatment, the plan is as noted in the assessment/plan.  For patients with pain not related to these issues, they are to seek any further needed care from a more appropriate provider, such as PCP.      Follow-up:     Return to clinic in 6 months for ongoing cancer surveillance.      Electronically signed by LIANNA Baum on 07/27/22 at 09:34 EDT

## 2022-09-02 DIAGNOSIS — I10 ESSENTIAL HYPERTENSION: ICD-10-CM

## 2022-09-02 RX ORDER — METOPROLOL SUCCINATE 50 MG/1
TABLET, EXTENDED RELEASE ORAL
Qty: 90 TABLET | Refills: 0 | Status: SHIPPED | OUTPATIENT
Start: 2022-09-02 | End: 2022-09-07 | Stop reason: SDUPTHER

## 2022-09-02 RX ORDER — LOSARTAN POTASSIUM 100 MG/1
TABLET ORAL
Qty: 90 TABLET | Refills: 0 | Status: SHIPPED | OUTPATIENT
Start: 2022-09-02 | End: 2022-09-07 | Stop reason: SDUPTHER

## 2022-09-02 NOTE — TELEPHONE ENCOUNTER
Rx Refill Note  Requested Prescriptions     Pending Prescriptions Disp Refills   • losartan (COZAAR) 100 MG tablet [Pharmacy Med Name: LOSARTAN POTASSIUM 100 MG TAB] 90 tablet 1     Sig: TAKE ONE TABLET BY MOUTH DAILY   • metoprolol succinate XL (TOPROL-XL) 50 MG 24 hr tablet [Pharmacy Med Name: METOPROLOL SUCC ER 50MG TAB, UU] 90 tablet 1     Sig: TAKE ONE TABLET BY MOUTH DAILY      Last filled: 03/03/2022  Last office visit with prescribing clinician: 4/14/2022      Next office visit with prescribing clinician: 9/7/2022 April SILVESTRE Walls MA  09/02/22, 12:44 EDT     Patient will run out before her appointment. Please advise on refills?

## 2022-09-07 ENCOUNTER — OFFICE VISIT (OUTPATIENT)
Dept: INTERNAL MEDICINE | Facility: CLINIC | Age: 57
End: 2022-09-07

## 2022-09-07 ENCOUNTER — LAB (OUTPATIENT)
Dept: LAB | Facility: HOSPITAL | Age: 57
End: 2022-09-07

## 2022-09-07 VITALS
OXYGEN SATURATION: 96 % | WEIGHT: 240 LBS | HEART RATE: 54 BPM | SYSTOLIC BLOOD PRESSURE: 116 MMHG | TEMPERATURE: 97.8 F | BODY MASS INDEX: 38.57 KG/M2 | HEIGHT: 66 IN | DIASTOLIC BLOOD PRESSURE: 82 MMHG

## 2022-09-07 DIAGNOSIS — Z00.00 ANNUAL PHYSICAL EXAM: ICD-10-CM

## 2022-09-07 DIAGNOSIS — E66.01 CLASS 2 SEVERE OBESITY WITH SERIOUS COMORBIDITY AND BODY MASS INDEX (BMI) OF 38.0 TO 38.9 IN ADULT, UNSPECIFIED OBESITY TYPE: ICD-10-CM

## 2022-09-07 DIAGNOSIS — I10 ESSENTIAL HYPERTENSION: ICD-10-CM

## 2022-09-07 DIAGNOSIS — Z12.31 ENCOUNTER FOR SCREENING MAMMOGRAM FOR MALIGNANT NEOPLASM OF BREAST: ICD-10-CM

## 2022-09-07 DIAGNOSIS — Z00.00 ANNUAL PHYSICAL EXAM: Primary | ICD-10-CM

## 2022-09-07 DIAGNOSIS — Z13.820 SCREENING FOR OSTEOPOROSIS: ICD-10-CM

## 2022-09-07 DIAGNOSIS — E04.1 LEFT THYROID NODULE: ICD-10-CM

## 2022-09-07 LAB
ALBUMIN SERPL-MCNC: 4.3 G/DL (ref 3.5–5.2)
ALBUMIN/GLOB SERPL: 1.7 G/DL
ALP SERPL-CCNC: 106 U/L (ref 39–117)
ALT SERPL W P-5'-P-CCNC: 19 U/L (ref 1–33)
ANION GAP SERPL CALCULATED.3IONS-SCNC: 6.9 MMOL/L (ref 5–15)
AST SERPL-CCNC: 18 U/L (ref 1–32)
BASOPHILS # BLD AUTO: 0.03 10*3/MM3 (ref 0–0.2)
BASOPHILS NFR BLD AUTO: 0.6 % (ref 0–1.5)
BILIRUB BLD-MCNC: NEGATIVE MG/DL
BILIRUB SERPL-MCNC: 0.6 MG/DL (ref 0–1.2)
BUN SERPL-MCNC: 11 MG/DL (ref 6–20)
BUN/CREAT SERPL: 14.7 (ref 7–25)
CALCIUM SPEC-SCNC: 9.8 MG/DL (ref 8.6–10.5)
CHLORIDE SERPL-SCNC: 99 MMOL/L (ref 98–107)
CHOLEST SERPL-MCNC: 218 MG/DL (ref 0–200)
CLARITY, POC: CLEAR
CO2 SERPL-SCNC: 30.1 MMOL/L (ref 22–29)
COLOR UR: YELLOW
CREAT SERPL-MCNC: 0.75 MG/DL (ref 0.57–1)
DEPRECATED RDW RBC AUTO: 41.6 FL (ref 37–54)
EGFRCR SERPLBLD CKD-EPI 2021: 92.4 ML/MIN/1.73
EOSINOPHIL # BLD AUTO: 0.12 10*3/MM3 (ref 0–0.4)
EOSINOPHIL NFR BLD AUTO: 2.4 % (ref 0.3–6.2)
ERYTHROCYTE [DISTWIDTH] IN BLOOD BY AUTOMATED COUNT: 13.1 % (ref 12.3–15.4)
EXPIRATION DATE: NORMAL
GLOBULIN UR ELPH-MCNC: 2.6 GM/DL
GLUCOSE SERPL-MCNC: 93 MG/DL (ref 65–99)
GLUCOSE UR STRIP-MCNC: NEGATIVE MG/DL
HCT VFR BLD AUTO: 42.6 % (ref 34–46.6)
HDLC SERPL-MCNC: 59 MG/DL (ref 40–60)
HGB BLD-MCNC: 14.1 G/DL (ref 12–15.9)
IMM GRANULOCYTES # BLD AUTO: 0.01 10*3/MM3 (ref 0–0.05)
IMM GRANULOCYTES NFR BLD AUTO: 0.2 % (ref 0–0.5)
KETONES UR QL: NEGATIVE
LDLC SERPL CALC-MCNC: 127 MG/DL (ref 0–100)
LDLC/HDLC SERPL: 2.07 {RATIO}
LEUKOCYTE EST, POC: NEGATIVE
LYMPHOCYTES # BLD AUTO: 0.81 10*3/MM3 (ref 0.7–3.1)
LYMPHOCYTES NFR BLD AUTO: 16.3 % (ref 19.6–45.3)
Lab: NORMAL
MCH RBC QN AUTO: 28.8 PG (ref 26.6–33)
MCHC RBC AUTO-ENTMCNC: 33.1 G/DL (ref 31.5–35.7)
MCV RBC AUTO: 87.1 FL (ref 79–97)
MONOCYTES # BLD AUTO: 0.35 10*3/MM3 (ref 0.1–0.9)
MONOCYTES NFR BLD AUTO: 7.1 % (ref 5–12)
NEUTROPHILS NFR BLD AUTO: 3.64 10*3/MM3 (ref 1.7–7)
NEUTROPHILS NFR BLD AUTO: 73.4 % (ref 42.7–76)
NITRITE UR-MCNC: NEGATIVE MG/ML
NRBC BLD AUTO-RTO: 0 /100 WBC (ref 0–0.2)
PH UR: 6 [PH] (ref 5–8)
PLATELET # BLD AUTO: 176 10*3/MM3 (ref 140–450)
PMV BLD AUTO: 11 FL (ref 6–12)
POTASSIUM SERPL-SCNC: 4.2 MMOL/L (ref 3.5–5.2)
PROT SERPL-MCNC: 6.9 G/DL (ref 6–8.5)
PROT UR STRIP-MCNC: NEGATIVE MG/DL
RBC # BLD AUTO: 4.89 10*6/MM3 (ref 3.77–5.28)
RBC # UR STRIP: NEGATIVE /UL
SODIUM SERPL-SCNC: 136 MMOL/L (ref 136–145)
SP GR UR: 1.02 (ref 1–1.03)
TRIGL SERPL-MCNC: 184 MG/DL (ref 0–150)
TSH SERPL DL<=0.05 MIU/L-ACNC: 1.86 UIU/ML (ref 0.27–4.2)
UROBILINOGEN UR QL: NORMAL
VLDLC SERPL-MCNC: 32 MG/DL (ref 5–40)
WBC NRBC COR # BLD: 4.96 10*3/MM3 (ref 3.4–10.8)

## 2022-09-07 PROCEDURE — 81003 URINALYSIS AUTO W/O SCOPE: CPT | Performed by: FAMILY MEDICINE

## 2022-09-07 PROCEDURE — 80050 GENERAL HEALTH PANEL: CPT | Performed by: FAMILY MEDICINE

## 2022-09-07 PROCEDURE — 80061 LIPID PANEL: CPT | Performed by: FAMILY MEDICINE

## 2022-09-07 PROCEDURE — 99396 PREV VISIT EST AGE 40-64: CPT | Performed by: FAMILY MEDICINE

## 2022-09-07 RX ORDER — LOSARTAN POTASSIUM 100 MG/1
100 TABLET ORAL DAILY
Qty: 90 TABLET | Refills: 1 | Status: SHIPPED | OUTPATIENT
Start: 2022-09-07

## 2022-09-07 RX ORDER — METOPROLOL SUCCINATE 50 MG/1
50 TABLET, EXTENDED RELEASE ORAL DAILY
Qty: 90 TABLET | Refills: 1 | Status: SHIPPED | OUTPATIENT
Start: 2022-09-07

## 2022-09-07 NOTE — PROGRESS NOTES
"Subjective   Tiarra Becerra is a 57 y.o. female.     Chief Complaint   Patient presents with   • Annual Exam       Visit Vitals  /82   Pulse 54   Temp 97.8 °F (36.6 °C)   Ht 167.6 cm (66\")   Wt 109 kg (240 lb)   LMP 01/02/2018   SpO2 96%   BMI 38.74 kg/m²       Wt Readings from Last 3 Encounters:   09/07/22 109 kg (240 lb)   07/27/22 110 kg (242 lb)   05/31/22 112 kg (248 lb)         History of Present Illness   Pt here for annual exam.  Pt is seeing Dr Jaffe/Nanette DSOUZA for endometrial CA. Pt is in remission.   Pt is working on weight loss. Pt is using NOOM.     Pt needs refills of her blood pressure medication.   The following portions of the patient's history were reviewed and updated as appropriate: allergies, current medications, past family history, past medical history, past social history, past surgical history and problem list.    Past Medical History:   Diagnosis Date   • Diverticulitis of colon    • Endometrial cancer (HCC) 11/28/2018   • Essential hypertension 08/06/2018   • Hx of radiation therapy       Past Surgical History:   Procedure Laterality Date   • COLONOSCOPY  11/25/2019    Dr Giraldo, yady, internal hemorrhoids   • D & C HYSTEROSCOPY MYOSURE  11/05/2018   • OOPHORECTOMY     • TOTAL LAPAROSCOPIC HYSTERECTOMY WITH DAVINCI ROBOT  11/30/2018    laproscopic with BSO, lymph node dissection, left parametrectomy, lysis ureters, Dr Jaffe. Cancer   • TOTAL LAPAROSCOPIC HYSTERECTOMY, LAPAROSCOPIC NODE DISSECTION N/A 11/30/2018    Procedure: TOTAL LAPAROSCOPIC HYSTERECTOMY WITH DAVINCI ROBOT, BILATERAL SALPINGO OOPHORECTOMY, WITH RADICAL LEFT PARAMETRECTOMY, BILATERAL UTERETAL LYSIS, PELVIC LYMPH NODE DISSECTION;  Surgeon: April Jaffe MD;  Location: [facility] OR;  Service: DaVinci   • TUBAL ABDOMINAL LIGATION  2006      Family History   Problem Relation Age of Onset   • Arthritis Mother    • Heart attack Mother    • Cancer Father         prostate   • Heart attack Father    • " Hypertension Father    • Stroke Father    • Diabetes Sister         type 1   • Diabetes Brother         type 1   • Stroke Paternal Grandmother    • Breast cancer Cousin 55        50's   • Ovarian cancer Neg Hx       Social History     Socioeconomic History   • Marital status:    Tobacco Use   • Smoking status: Former Smoker     Packs/day: 0.75     Years: 12.00     Pack years: 9.00     Types: Cigarettes     Quit date:      Years since quittin.7   • Smokeless tobacco: Never Used   Vaping Use   • Vaping Use: Never used   Substance and Sexual Activity   • Alcohol use: Yes     Comment: on occasion   • Drug use: No   • Sexual activity: Yes     Partners: Male      Allergies   Allergen Reactions   • Lisinopril Cough       Review of Systems   Constitutional: Negative.  Negative for activity change, appetite change, chills, diaphoresis, fatigue, fever and unexpected weight change.   HENT: Positive for tinnitus (chronic). Negative for congestion, dental problem, drooling, ear discharge, ear pain, facial swelling, hearing loss, mouth sores, nosebleeds, postnasal drip, rhinorrhea, sinus pressure, sinus pain, sneezing, sore throat, trouble swallowing and voice change.    Eyes: Negative.  Negative for photophobia, pain, discharge, redness, itching and visual disturbance.   Respiratory: Negative.  Negative for apnea, cough, choking, chest tightness, shortness of breath, wheezing and stridor.    Cardiovascular: Negative.  Negative for chest pain, palpitations and leg swelling.   Gastrointestinal: Negative.  Negative for abdominal distention, abdominal pain, anal bleeding, blood in stool, constipation, diarrhea, nausea, rectal pain and vomiting.   Endocrine: Negative.  Negative for cold intolerance, heat intolerance, polydipsia, polyphagia and polyuria.   Genitourinary: Negative.  Negative for decreased urine volume, difficulty urinating, dyspareunia, dysuria, enuresis, flank pain, frequency, genital sores, hematuria,  menstrual problem, pelvic pain, urgency, vaginal bleeding, vaginal discharge and vaginal pain.   Musculoskeletal: Negative.  Negative for arthralgias, back pain, gait problem, joint swelling, myalgias, neck pain and neck stiffness.   Skin: Negative.  Negative for color change, pallor, rash and wound.   Allergic/Immunologic: Negative.  Negative for environmental allergies, food allergies and immunocompromised state.   Neurological: Negative.  Negative for dizziness, tremors, seizures, syncope, facial asymmetry, speech difficulty, weakness, light-headedness, numbness and headaches.   Hematological: Negative.  Negative for adenopathy. Does not bruise/bleed easily.   Psychiatric/Behavioral: Negative.  Negative for agitation, behavioral problems, confusion, decreased concentration, dysphoric mood, hallucinations, self-injury, sleep disturbance and suicidal ideas. The patient is not nervous/anxious and is not hyperactive.      PHQ-2 Depression Screening  Little interest or pleasure in doing things? 0-->not at all   Feeling down, depressed, or hopeless? 0-->not at all   PHQ-2 Total Score 0       Objective   Physical Exam  Vitals and nursing note reviewed.   Constitutional:       General: She is not in acute distress.     Appearance: She is well-developed. She is not diaphoretic.   HENT:      Head: Normocephalic and atraumatic.      Right Ear: Tympanic membrane, ear canal and external ear normal.      Left Ear: Tympanic membrane, ear canal and external ear normal.      Nose: Nose normal.      Mouth/Throat:      Lips: Pink.      Mouth: Mucous membranes are moist. Mucous membranes are not pale, not dry and not cyanotic. No injury.      Dentition: Normal dentition.      Tongue: No lesions.      Palate: No mass.      Pharynx: Uvula midline. No pharyngeal swelling, oropharyngeal exudate, posterior oropharyngeal erythema or uvula swelling.   Eyes:      General: Lids are normal. No scleral icterus.        Right eye: No foreign  body, discharge or hordeolum.         Left eye: No foreign body, discharge or hordeolum.      Extraocular Movements:      Right eye: Normal extraocular motion and no nystagmus.      Left eye: Normal extraocular motion and no nystagmus.      Conjunctiva/sclera: Conjunctivae normal.      Right eye: Right conjunctiva is not injected. No chemosis, exudate or hemorrhage.     Left eye: Left conjunctiva is not injected. No chemosis, exudate or hemorrhage.     Pupils: Pupils are equal, round, and reactive to light.   Neck:      Thyroid: Thyroid mass present. No thyromegaly or thyroid tenderness.      Vascular: No carotid bruit.      Trachea: Trachea normal. No tracheal deviation.     Cardiovascular:      Rate and Rhythm: Normal rate and regular rhythm.      Pulses:           Dorsalis pedis pulses are 2+ on the right side and 2+ on the left side.        Posterior tibial pulses are 2+ on the right side and 2+ on the left side.      Heart sounds: Normal heart sounds. No murmur heard.    No friction rub. No gallop.   Pulmonary:      Effort: Pulmonary effort is normal. No respiratory distress.      Breath sounds: Normal breath sounds. No decreased breath sounds, wheezing, rhonchi or rales.   Chest:      Chest wall: No tenderness. There is no dullness to percussion.   Breasts:      Pritesh Score is 5. Breasts are symmetrical.      Right: Normal. No swelling, bleeding, inverted nipple, mass, nipple discharge, skin change, tenderness, axillary adenopathy or supraclavicular adenopathy.      Left: Normal. No swelling, bleeding, inverted nipple, mass, nipple discharge, skin change, tenderness, axillary adenopathy or supraclavicular adenopathy.       Abdominal:      General: Bowel sounds are normal. There is no distension.      Palpations: Abdomen is soft. There is no hepatomegaly, splenomegaly or mass.      Tenderness: There is no abdominal tenderness. There is no guarding or rebound.      Hernia: No hernia is present.    Musculoskeletal:         General: No tenderness or deformity. Normal range of motion.      Cervical back: Normal range of motion and neck supple.   Lymphadenopathy:      Head:      Right side of head: No submandibular adenopathy.      Left side of head: No submandibular adenopathy.      Cervical: No cervical adenopathy.      Right cervical: No superficial, deep or posterior cervical adenopathy.     Left cervical: No superficial, deep or posterior cervical adenopathy.      Upper Body:      Right upper body: No supraclavicular, axillary or pectoral adenopathy.      Left upper body: No supraclavicular, axillary or pectoral adenopathy.   Skin:     General: Skin is warm and dry.      Findings: No rash.      Nails: There is no clubbing.   Neurological:      Mental Status: She is alert and oriented to person, place, and time.      Cranial Nerves: No cranial nerve deficit.      Sensory: No sensory deficit.      Coordination: Coordination normal.      Deep Tendon Reflexes: Reflexes are normal and symmetric.      Reflex Scores:       Bicep reflexes are 2+ on the right side and 2+ on the left side.       Brachioradialis reflexes are 2+ on the right side and 2+ on the left side.       Patellar reflexes are 2+ on the right side and 2+ on the left side.  Psychiatric:         Attention and Perception: Attention normal.         Mood and Affect: Mood and affect normal.         Speech: Speech normal.         Behavior: Behavior normal.         Thought Content: Thought content normal.         Cognition and Memory: Cognition normal.         Judgment: Judgment normal.         Assessment & Plan   Diagnoses and all orders for this visit:    1. Annual physical exam (Primary)  -     POCT urinalysis dipstick, automated  -     Comprehensive Metabolic Panel; Future  -     TSH Rfx On Abnormal To Free T4; Future  -     Lipid Panel; Future  -     CBC & Differential; Future    2. Essential hypertension  -     losartan (COZAAR) 100 MG tablet; Take  1 tablet by mouth Daily.  Dispense: 90 tablet; Refill: 1  -     metoprolol succinate XL (TOPROL-XL) 50 MG 24 hr tablet; Take 1 tablet by mouth Daily.  Dispense: 90 tablet; Refill: 1    3. Class 2 severe obesity with serious comorbidity and body mass index (BMI) of 38.0 to 38.9 in adult, unspecified obesity type (HCC)    4. Screening for osteoporosis  -     DEXA Bone Density Axial; Future    5. Encounter for screening mammogram for malignant neoplasm of breast  -     Mammo Screening Digital Tomosynthesis Bilateral With CAD; Future    6. Left thyroid nodule  -     Cancel: US Thyroid; Future  -     Ambulatory Referral to Endocrinology        Patient's (Body mass index is 38.74 kg/m².) indicates that they are morbidly obese (BMI > 40 or > 35 with obesity - related health condition) with health related conditions that include hypertension . Weight is improving with lifestyle modifications. BMI is is above average; BMI management plan is completed. We discussed portion control and increasing exercise.     Please follow a low animal fat diet that is also low in sugar, low in junk food, low in sweet drinks and low in alcohol.  Please increase the amount of fiber in your diet as well as increasing your daily exercise, such as walking.           Current Outpatient Medications:   •  cetirizine (ZyrTEC) 10 MG tablet, Take 10 mg by mouth Daily., Disp: , Rfl:   •  Cholecalciferol (VITAMIN D-3 PO), Take 2,000 Units by mouth Daily., Disp: , Rfl:   •  losartan (COZAAR) 100 MG tablet, Take 1 tablet by mouth Daily., Disp: 90 tablet, Rfl: 1  •  metoprolol succinate XL (TOPROL-XL) 50 MG 24 hr tablet, Take 1 tablet by mouth Daily., Disp: 90 tablet, Rfl: 1    Return in about 6 months (around 3/7/2023), or if symptoms worsen or fail to improve, for Recheck bp.     Recent Results (from the past 168 hour(s))   POCT urinalysis dipstick, automated    Collection Time: 09/07/22  9:33 AM    Specimen: Urine   Result Value Ref Range    Color Yellow  Yellow, Straw, Dark Yellow, Sushila    Clarity, UA Clear Clear    Specific Gravity  1.020 1.005 - 1.030    pH, Urine 6.0 5.0 - 8.0    Leukocytes Negative Negative    Nitrite, UA Negative Negative    Protein, POC Negative Negative mg/dL    Glucose, UA Negative Negative mg/dL    Ketones, UA Negative Negative    Urobilinogen, UA Normal Normal, 0.2 E.U./dL    Bilirubin Negative Negative    Blood, UA Negative Negative    Lot Number [id number]     Expiration Date 2/10/2024

## 2022-09-08 ENCOUNTER — TELEPHONE (OUTPATIENT)
Dept: INTERNAL MEDICINE | Facility: CLINIC | Age: 57
End: 2022-09-08

## 2022-09-08 DIAGNOSIS — E04.1 LEFT THYROID NODULE: Primary | ICD-10-CM

## 2022-09-08 PROBLEM — E78.2 MIXED HYPERLIPIDEMIA: Status: ACTIVE | Noted: 2022-09-08

## 2022-09-08 NOTE — TELEPHONE ENCOUNTER
Please let patient know that endocrine office wants her to have the ultrasound before she is seen.  Order will be placed for an ultrasound.  Patient had indicated she did not want to have it at [facility] because of cost

## 2022-10-05 DIAGNOSIS — E04.2 MULTIPLE THYROID NODULES: Primary | ICD-10-CM

## 2022-10-06 ENCOUNTER — TELEPHONE (OUTPATIENT)
Dept: INTERNAL MEDICINE | Facility: CLINIC | Age: 57
End: 2022-10-06

## 2022-10-06 NOTE — TELEPHONE ENCOUNTER
----- Message from Fallon RAY MD sent at 10/5/2022  6:09 PM EDT -----  Patient has nodules in both thyroid lobes.  Referral is being made to ear nose and throat.  Left side larger than right.

## 2022-10-21 ENCOUNTER — TELEPHONE (OUTPATIENT)
Dept: INTERNAL MEDICINE | Facility: CLINIC | Age: 57
End: 2022-10-21

## 2022-10-21 NOTE — TELEPHONE ENCOUNTER
Patient called requesting clarity as to why she had gotten a call from ENT and Endocrine.  I see 2 referrals in her chart one for each.  I assured her that Dr Cope had requested both but she believes they are both for same thing.   She requested a call back to explain to her why she needed to see both otolaryngology and endocrinology,

## 2022-10-21 NOTE — TELEPHONE ENCOUNTER
Both of these are for thyroid nodule.  The endocrine referral was placed last month.  Patient does not have an appointment until December.  ENT can see her faster which is why that referral was placed yes they are both for thyroid nodules.  Both have the capacity to do fine-needle biopsies if they think there is a problem with the nodules.

## 2022-10-24 NOTE — TELEPHONE ENCOUNTER
Called and spoke with patient, reviewed Dr. Cope's recommendations for having both referrals. She verbalized understanding and had no further questions.

## 2022-10-31 ENCOUNTER — HOSPITAL ENCOUNTER (OUTPATIENT)
Dept: MAMMOGRAPHY | Facility: HOSPITAL | Age: 57
Discharge: HOME | End: 2022-10-31

## 2022-10-31 ENCOUNTER — HOSPITAL ENCOUNTER (OUTPATIENT)
Dept: BONE DENSITY | Facility: HOSPITAL | Age: 57
Discharge: HOME | End: 2022-10-31

## 2022-10-31 DIAGNOSIS — Z13.820 SCREENING FOR OSTEOPOROSIS: ICD-10-CM

## 2022-10-31 DIAGNOSIS — Z12.31 ENCOUNTER FOR SCREENING MAMMOGRAM FOR MALIGNANT NEOPLASM OF BREAST: ICD-10-CM

## 2022-10-31 PROCEDURE — 77067 SCR MAMMO BI INCL CAD: CPT | Performed by: RADIOLOGY

## 2022-10-31 PROCEDURE — 77063 BREAST TOMOSYNTHESIS BI: CPT

## 2022-10-31 PROCEDURE — 77063 BREAST TOMOSYNTHESIS BI: CPT | Performed by: RADIOLOGY

## 2022-10-31 PROCEDURE — 77080 DXA BONE DENSITY AXIAL: CPT

## 2022-10-31 PROCEDURE — 77067 SCR MAMMO BI INCL CAD: CPT

## 2022-11-29 ENCOUNTER — TRANSCRIBE ORDERS (OUTPATIENT)
Dept: LAB | Facility: HOSPITAL | Age: 57
End: 2022-11-29

## 2022-11-29 ENCOUNTER — TRANSCRIBE ORDERS (OUTPATIENT)
Dept: CARDIOLOGY | Facility: HOSPITAL | Age: 57
End: 2022-11-29

## 2022-11-29 ENCOUNTER — LAB (OUTPATIENT)
Dept: LAB | Facility: HOSPITAL | Age: 57
End: 2022-11-29

## 2022-11-29 ENCOUNTER — HOSPITAL ENCOUNTER (OUTPATIENT)
Dept: CARDIOLOGY | Facility: HOSPITAL | Age: 57
Discharge: HOME | End: 2022-11-29

## 2022-11-29 DIAGNOSIS — Z01.812 PRE-OPERATIVE LABORATORY EXAMINATION: ICD-10-CM

## 2022-11-29 DIAGNOSIS — R94.31 EKG ABNORMALITIES: Primary | ICD-10-CM

## 2022-11-29 DIAGNOSIS — Z01.812 PRE-OPERATIVE LABORATORY EXAMINATION: Primary | ICD-10-CM

## 2022-11-29 LAB
ANION GAP SERPL CALCULATED.3IONS-SCNC: 6.7 MMOL/L (ref 5–15)
BUN SERPL-MCNC: 11 MG/DL (ref 6–20)
BUN/CREAT SERPL: 14.3 (ref 7–25)
CALCIUM SPEC-SCNC: 9.2 MG/DL (ref 8.6–10.5)
CHLORIDE SERPL-SCNC: 100 MMOL/L (ref 98–107)
CO2 SERPL-SCNC: 30.3 MMOL/L (ref 22–29)
CREAT SERPL-MCNC: 0.77 MG/DL (ref 0.57–1)
DEPRECATED RDW RBC AUTO: 39.6 FL (ref 37–54)
EGFRCR SERPLBLD CKD-EPI 2021: 89.5 ML/MIN/1.73
ERYTHROCYTE [DISTWIDTH] IN BLOOD BY AUTOMATED COUNT: 13 % (ref 12.3–15.4)
GLUCOSE SERPL-MCNC: 97 MG/DL (ref 65–99)
HCT VFR BLD AUTO: 42 % (ref 34–46.6)
HGB BLD-MCNC: 13.8 G/DL (ref 12–15.9)
MCH RBC QN AUTO: 27.8 PG (ref 26.6–33)
MCHC RBC AUTO-ENTMCNC: 32.9 G/DL (ref 31.5–35.7)
MCV RBC AUTO: 84.7 FL (ref 79–97)
PLATELET # BLD AUTO: 185 10*3/MM3 (ref 140–450)
PMV BLD AUTO: 10.7 FL (ref 6–12)
POTASSIUM SERPL-SCNC: 4.2 MMOL/L (ref 3.5–5.2)
QT INTERVAL: 444 MS
QTC INTERVAL: 432 MS
RBC # BLD AUTO: 4.96 10*6/MM3 (ref 3.77–5.28)
SODIUM SERPL-SCNC: 137 MMOL/L (ref 136–145)
WBC NRBC COR # BLD: 5.37 10*3/MM3 (ref 3.4–10.8)

## 2022-11-29 PROCEDURE — 80048 BASIC METABOLIC PNL TOTAL CA: CPT

## 2022-11-29 PROCEDURE — 93005 ELECTROCARDIOGRAM TRACING: CPT

## 2022-11-29 PROCEDURE — 85027 COMPLETE CBC AUTOMATED: CPT

## 2022-11-29 PROCEDURE — 93010 ELECTROCARDIOGRAM REPORT: CPT | Performed by: STUDENT IN AN ORGANIZED HEALTH CARE EDUCATION/TRAINING PROGRAM

## 2022-11-29 PROCEDURE — 36415 COLL VENOUS BLD VENIPUNCTURE: CPT

## 2023-01-23 PROBLEM — E89.0 POSTOPERATIVE HYPOTHYROIDISM: Status: ACTIVE | Noted: 2023-01-23

## 2023-01-23 PROBLEM — C73 THYROID CANCER: Status: ACTIVE | Noted: 2023-01-23

## 2023-01-25 ENCOUNTER — OFFICE VISIT (OUTPATIENT)
Dept: GYNECOLOGIC ONCOLOGY | Facility: CLINIC | Age: 58
End: 2023-01-25
Payer: COMMERCIAL

## 2023-01-25 VITALS
HEART RATE: 62 BPM | TEMPERATURE: 97.5 F | HEIGHT: 66 IN | OXYGEN SATURATION: 98 % | BODY MASS INDEX: 39.6 KG/M2 | WEIGHT: 246.4 LBS | RESPIRATION RATE: 16 BRPM | DIASTOLIC BLOOD PRESSURE: 84 MMHG | SYSTOLIC BLOOD PRESSURE: 124 MMHG

## 2023-01-25 DIAGNOSIS — Z85.42 HISTORY OF ENDOMETRIAL CANCER: Primary | ICD-10-CM

## 2023-01-25 DIAGNOSIS — C73 THYROID CANCER: ICD-10-CM

## 2023-01-25 PROCEDURE — 99212 OFFICE O/P EST SF 10 MIN: CPT | Performed by: NURSE PRACTITIONER

## 2023-01-25 RX ORDER — LEVOTHYROXINE SODIUM 0.12 MG/1
TABLET ORAL
COMMUNITY
Start: 2023-01-09

## 2023-01-25 NOTE — PROGRESS NOTES
GYN ONCOLOGY CANCER SURVEILLANCE FOLLOW-UP    Tiarra Becerra  [id number]  1965    Subjective   Chief Complaint: Uterine Cancer (No gyn complaints)        History of present illness:     Tiarra Becerra is a 57 y.o. year old female who is here today for ongoing surveillance of Endometrial Cancer, see Cancer History. She is approaching 4 years out from completion of treatment. She reports she is feeling generally well today and has no gyn complaints. She denies vaginal bleeding, pelvic pain, concerning lesions, and changes in bowel or bladder function. Since our last visit patient has been diagnosed with thyroid cancer. She is s/p surgery 12/13/2022, reports good margins and negative nodes. She is being followed closely by endocrinology and ENT. She is unsure yet if she will need adjuvant treatment. She is well healed from surgery and coping well.        Cancer History:   Oncology/Hematology History   Endometrial cancer (HCC)   10/2018 Imaging    Patient presented to Dr. Davis for Annual exam with c/o new onset vaginal bleeding after amenorrhea x 8 months.   TVUS revealed large polyp vs fibroid in endocervical canal     11/5/2018 Procedure    ECC and Myosure by gynecologist. Pathology revealed grade 1 endometrioid adenocarcinoma in a background of complex atypical hyperplasia, suspicion for myoinvasion. Pt referred to Gyn Oncology     11/30/2018 Surgery    RTLH/BSO, radical left parametrectomy, left ureteral lysis, and pelvic lymph node dissection.   There was suspicion for concurrent cervical cancer at time of surgery, found to be multifocal grade 2-3 endometrioid carcinoma with extensive lower uterine segment involvement upon final pathology. Tumor invasive into over 50% of the myometrium (11/14 mm). Nodes negative. MSI testing normal. Stage IB grade 2-3     12/26/2018 Imaging    Post-op CT abdomen and pelvis negative     1/15/2019 - 2/18/2019 Radiation    Radiation OncologyTreatment Course:  Tiarra Becerra  "received 4500 cGy in 25 fractions to pelvis via External Beam Radiation - EBRT.     2/26/2019 - 2/28/2019 Radiation    Radiation OncologyTreatment Course:  Tiarra Becerra received 1200 cGy in 2 fractions to vagina via High Dose Radiation - HDR.     6/12/2019 Imaging    CT chest, abdomen, pelvis negative     12/2/2019 Survivorship    Survivorship Care Plan completed and discussed with patient.  Copy of Survivorship Care Plan provided to patient and primary care provider.           The current medication list and allergy list were reviewed and reconciled.     Past Medical History, Past Surgical History, Social History, Family History have been reviewed and are without significant changes except as mentioned.      Review of Systems   Constitutional: Negative.    HENT:        S/p thyroid surgery   Gastrointestinal: Negative.    Genitourinary: Negative.          Objective   Physical Exam  Vital Signs: /84   Pulse 62   Temp 97.5 °F (36.4 °C) (Temporal)   Resp 16   Ht 167.6 cm (65.98\")   Wt 112 kg (246 lb 6.4 oz)   LMP 01/02/2018   SpO2 98%   BMI 39.79 kg/m²    Wt Readings from Last 10 Encounters:   01/25/23 112 kg (246 lb 6.4 oz)   09/07/22 109 kg (240 lb)   07/27/22 110 kg (242 lb)   05/31/22 112 kg (248 lb)   04/27/22 116 kg (254 lb 12.8 oz)   04/14/22 117 kg (257 lb)   04/06/22 117 kg (259 lb)   03/03/22 119 kg (262 lb)   01/27/22 120 kg (265 lb)   09/02/21 117 kg (259 lb)       Vitals:    01/25/23 0912   PainSc: 0-No pain           General Appearance:  alert, cooperative, no apparent distress, appears stated age and obese by BMI criteria   Neurologic/Psychiatric: A&O x 3, gait steady, appropriate affect   HEENT:  Normocephalic, without obvious abnormality, mucous membranes moist. Scar to mid neck consistent with recent surgery, well healed   Abdomen:   Soft, non-tender, non-distended, no organomegaly and Exam limited d/t habitus.   Lymph nodes: No cervical, supraclavicular, inguinal adenopathy noted "   Pelvic: External Genitalia  without lesions or skin changes  Vagina  is pink, moist, without lesions.   Vaginal Cuff  Female Vaginal Cuff: smooth, intact and without visible lesions  Uterus  surgically absent and no palpable masses  Ovaries  surgically absent bilaterally  Parametria  smooth  Rectovaginal  Female rectovaginal: deferred     ECOG score: 0             Result Review :   Last imaging study was CT abdomen pelvis 4/7/2022.              Assessment and Plan:    Diagnoses and all orders for this visit:    1. History of endometrial cancer (Primary)    2. Thyroid cancer (HCC)          There is no evidence of disease upon today's exam. Continue every 6 month cancer surveillance until the 5 year bernadine. She is understanding to call with any changes in pelvic symptoms or general GYN concerns at any time between regularly scheduled visits.     We are very sorry to hear about her thyroid cancer diagnosis. She appears to be well healed from surgery and is hopeful she will not need any adjuvant treatment. Keep all follow-ups with ENT and endocrinology as scheduled.     Pain assessment was performed today as a part of patient’s care.  For patients with pain related to surgery, gynecologic malignancy or cancer treatment, the plan is as noted in the assessment/plan.  For patients with pain not related to these issues, they are to seek any further needed care from a more appropriate provider, such as PCP.      Follow-up:     Return to clinic in 6 months for ongoing cancer surveillance.      Electronically signed by LIANNA Baum on 01/25/23 at 09:47 EST

## 2023-04-24 ENCOUNTER — OFFICE VISIT (OUTPATIENT)
Dept: INTERNAL MEDICINE | Facility: CLINIC | Age: 58
End: 2023-04-24
Payer: COMMERCIAL

## 2023-04-24 VITALS
TEMPERATURE: 98 F | OXYGEN SATURATION: 97 % | HEART RATE: 78 BPM | SYSTOLIC BLOOD PRESSURE: 122 MMHG | DIASTOLIC BLOOD PRESSURE: 80 MMHG | RESPIRATION RATE: 20 BRPM

## 2023-04-24 DIAGNOSIS — J40 BRONCHITIS: ICD-10-CM

## 2023-04-24 DIAGNOSIS — J04.0 LARYNGITIS: ICD-10-CM

## 2023-04-24 DIAGNOSIS — R05.1 ACUTE COUGH: Primary | ICD-10-CM

## 2023-04-24 DIAGNOSIS — J02.9 SORE THROAT: ICD-10-CM

## 2023-04-24 LAB
EXPIRATION DATE: NORMAL
FLUAV AG UPPER RESP QL IA.RAPID: NOT DETECTED
FLUBV AG UPPER RESP QL IA.RAPID: NOT DETECTED
INTERNAL CONTROL: NORMAL
Lab: NORMAL
SARS-COV-2 AG UPPER RESP QL IA.RAPID: NOT DETECTED

## 2023-04-24 PROCEDURE — 87428 SARSCOV & INF VIR A&B AG IA: CPT | Performed by: FAMILY MEDICINE

## 2023-04-24 PROCEDURE — 99213 OFFICE O/P EST LOW 20 MIN: CPT | Performed by: FAMILY MEDICINE

## 2023-04-24 RX ORDER — AZITHROMYCIN 250 MG/1
TABLET, FILM COATED ORAL
Qty: 6 TABLET | Refills: 0 | Status: SHIPPED | OUTPATIENT
Start: 2023-04-24

## 2023-04-24 NOTE — PROGRESS NOTES
"    Office Note     Name: Tiarra Becerra    : 1965     MRN: [id number]     Chief Complaint  Cough (X 5 days) and Nasal Congestion    Subjective     History of Present Illness:  Tiarra Becerra is a 58 y.o. female who presents today for several concerns.  She states that she has had a cough for over a week and her voice has been hoarse and strained for several days and she is coughing up thick discolored mucus and has had some sinus drainage and she is concerned about infection.    Review of Systems   Respiratory: Positive for cough. Negative for shortness of breath and wheezing.    Cardiovascular: Negative for chest pain and palpitations.   Gastrointestinal: Negative for blood in stool, diarrhea and vomiting.   Genitourinary: Negative for dysuria, flank pain and genital sores.       Objective     Vital Signs  /80 (Cuff Size: Large Adult)   Pulse 78   Temp 98 °F (36.7 °C) (Infrared)   Resp 20   SpO2 97%   Estimated body mass index is 39.79 kg/m² as calculated from the following:    Height as of 23: 167.6 cm (65.98\").    Weight as of 23: 112 kg (246 lb 6.4 oz).          Physical Exam  Vitals and nursing note reviewed.   HENT:      Head: Normocephalic and atraumatic.      Jaw: There is normal jaw occlusion.      Salivary Glands: Right salivary gland is not diffusely enlarged or tender. Left salivary gland is not diffusely enlarged or tender.      Right Ear: Hearing, tympanic membrane, ear canal and external ear normal.      Left Ear: Hearing, tympanic membrane, ear canal and external ear normal.      Nose: Nose normal.      Mouth/Throat:      Lips: Pink.      Mouth: Mucous membranes are moist.      Tongue: No lesions.      Palate: No lesions.      Pharynx: Oropharynx is clear.   Neck:      Vascular: No carotid bruit.   Cardiovascular:      Rate and Rhythm: Normal rate and regular rhythm.      Heart sounds: Normal heart sounds. No murmur heard.    No gallop.   Pulmonary:      Effort: " Pulmonary effort is normal. No respiratory distress.      Breath sounds: No stridor. No wheezing, rhonchi or rales.   Musculoskeletal:      Cervical back: Normal range of motion and neck supple.      Right lower leg: No edema.      Left lower leg: No edema.   Lymphadenopathy:      Cervical: No cervical adenopathy.   Neurological:      Mental Status: She is alert.                 Assessment and Plan     Diagnoses and all orders for this visit:    1. Acute cough (Primary)  -     POCT SARS-CoV-2 Antigen REGINE + Flu    2. Sore throat  -     Cancel: POCT rapid strep A    3. Laryngitis    4. Bronchitis  -     azithromycin (Zithromax Z-Venkatesh) 250 MG tablet; Take 2 tablets by mouth on day 1, then 1 tablet daily on days 2-5  Dispense: 6 tablet; Refill: 0          Follow Up  Return if symptoms worsen or fail to improve, for F/U with Dr. Cope.    Kathy Gil DO

## 2023-05-22 ENCOUNTER — LAB (OUTPATIENT)
Dept: INTERNAL MEDICINE | Facility: CLINIC | Age: 58
End: 2023-05-22
Payer: COMMERCIAL

## 2023-05-22 ENCOUNTER — OFFICE VISIT (OUTPATIENT)
Dept: INTERNAL MEDICINE | Facility: CLINIC | Age: 58
End: 2023-05-22
Payer: COMMERCIAL

## 2023-05-22 VITALS
TEMPERATURE: 97.5 F | DIASTOLIC BLOOD PRESSURE: 78 MMHG | HEIGHT: 66 IN | OXYGEN SATURATION: 97 % | WEIGHT: 251 LBS | HEART RATE: 67 BPM | SYSTOLIC BLOOD PRESSURE: 120 MMHG | BODY MASS INDEX: 40.34 KG/M2

## 2023-05-22 DIAGNOSIS — E89.0 POSTOPERATIVE HYPOTHYROIDISM: ICD-10-CM

## 2023-05-22 DIAGNOSIS — R49.0 HOARSE: ICD-10-CM

## 2023-05-22 DIAGNOSIS — C73 THYROID CANCER: ICD-10-CM

## 2023-05-22 DIAGNOSIS — I10 ESSENTIAL HYPERTENSION: Primary | ICD-10-CM

## 2023-05-22 DIAGNOSIS — Z23 NEED FOR COVID-19 VACCINE: ICD-10-CM

## 2023-05-22 DIAGNOSIS — I10 ESSENTIAL HYPERTENSION: ICD-10-CM

## 2023-05-22 LAB
ALBUMIN SERPL-MCNC: 4.3 G/DL (ref 3.5–5.2)
ALBUMIN/GLOB SERPL: 1.9 G/DL
ALP SERPL-CCNC: 102 U/L (ref 39–117)
ALT SERPL W P-5'-P-CCNC: 18 U/L (ref 1–33)
ANION GAP SERPL CALCULATED.3IONS-SCNC: 13.1 MMOL/L (ref 5–15)
AST SERPL-CCNC: 17 U/L (ref 1–32)
BASOPHILS # BLD AUTO: 0.04 10*3/MM3 (ref 0–0.2)
BASOPHILS NFR BLD AUTO: 0.7 % (ref 0–1.5)
BILIRUB SERPL-MCNC: 0.5 MG/DL (ref 0–1.2)
BUN SERPL-MCNC: 13 MG/DL (ref 6–20)
BUN/CREAT SERPL: 17.3 (ref 7–25)
CALCIUM SPEC-SCNC: 9.5 MG/DL (ref 8.6–10.5)
CHLORIDE SERPL-SCNC: 101 MMOL/L (ref 98–107)
CHOLEST SERPL-MCNC: 260 MG/DL (ref 0–200)
CO2 SERPL-SCNC: 23.9 MMOL/L (ref 22–29)
CREAT SERPL-MCNC: 0.75 MG/DL (ref 0.57–1)
DEPRECATED RDW RBC AUTO: 39.4 FL (ref 37–54)
EGFRCR SERPLBLD CKD-EPI 2021: 91.8 ML/MIN/1.73
EOSINOPHIL # BLD AUTO: 0.17 10*3/MM3 (ref 0–0.4)
EOSINOPHIL NFR BLD AUTO: 3.1 % (ref 0.3–6.2)
ERYTHROCYTE [DISTWIDTH] IN BLOOD BY AUTOMATED COUNT: 13.1 % (ref 12.3–15.4)
GLOBULIN UR ELPH-MCNC: 2.3 GM/DL
GLUCOSE SERPL-MCNC: 101 MG/DL (ref 65–99)
HCT VFR BLD AUTO: 40.1 % (ref 34–46.6)
HDLC SERPL-MCNC: 64 MG/DL (ref 40–60)
HGB BLD-MCNC: 13.6 G/DL (ref 12–15.9)
IMM GRANULOCYTES # BLD AUTO: 0.02 10*3/MM3 (ref 0–0.05)
IMM GRANULOCYTES NFR BLD AUTO: 0.4 % (ref 0–0.5)
LDLC SERPL CALC-MCNC: 161 MG/DL (ref 0–100)
LDLC/HDLC SERPL: 2.45 {RATIO}
LYMPHOCYTES # BLD AUTO: 1 10*3/MM3 (ref 0.7–3.1)
LYMPHOCYTES NFR BLD AUTO: 18.1 % (ref 19.6–45.3)
MCH RBC QN AUTO: 28.3 PG (ref 26.6–33)
MCHC RBC AUTO-ENTMCNC: 33.9 G/DL (ref 31.5–35.7)
MCV RBC AUTO: 83.4 FL (ref 79–97)
MONOCYTES # BLD AUTO: 0.47 10*3/MM3 (ref 0.1–0.9)
MONOCYTES NFR BLD AUTO: 8.5 % (ref 5–12)
NEUTROPHILS NFR BLD AUTO: 3.83 10*3/MM3 (ref 1.7–7)
NEUTROPHILS NFR BLD AUTO: 69.2 % (ref 42.7–76)
NRBC BLD AUTO-RTO: 0 /100 WBC (ref 0–0.2)
PLATELET # BLD AUTO: 180 10*3/MM3 (ref 140–450)
PMV BLD AUTO: 10.5 FL (ref 6–12)
POTASSIUM SERPL-SCNC: 4.3 MMOL/L (ref 3.5–5.2)
PROT SERPL-MCNC: 6.6 G/DL (ref 6–8.5)
RBC # BLD AUTO: 4.81 10*6/MM3 (ref 3.77–5.28)
SODIUM SERPL-SCNC: 138 MMOL/L (ref 136–145)
T4 FREE SERPL-MCNC: 1.31 NG/DL (ref 0.93–1.7)
TRIGL SERPL-MCNC: 195 MG/DL (ref 0–150)
TSH SERPL DL<=0.05 MIU/L-ACNC: 0.32 UIU/ML (ref 0.27–4.2)
VLDLC SERPL-MCNC: 35 MG/DL (ref 5–40)
WBC NRBC COR # BLD: 5.53 10*3/MM3 (ref 3.4–10.8)

## 2023-05-22 PROCEDURE — 36415 COLL VENOUS BLD VENIPUNCTURE: CPT | Performed by: FAMILY MEDICINE

## 2023-05-22 PROCEDURE — 80061 LIPID PANEL: CPT | Performed by: FAMILY MEDICINE

## 2023-05-22 PROCEDURE — 80050 GENERAL HEALTH PANEL: CPT | Performed by: FAMILY MEDICINE

## 2023-05-22 PROCEDURE — 84439 ASSAY OF FREE THYROXINE: CPT | Performed by: FAMILY MEDICINE

## 2023-05-22 PROCEDURE — 99214 OFFICE O/P EST MOD 30 MIN: CPT | Performed by: FAMILY MEDICINE

## 2023-05-22 RX ORDER — METOPROLOL SUCCINATE 50 MG/1
50 TABLET, EXTENDED RELEASE ORAL DAILY
Qty: 90 TABLET | Refills: 1 | Status: SHIPPED | OUTPATIENT
Start: 2023-05-22

## 2023-05-22 RX ORDER — LOSARTAN POTASSIUM 100 MG/1
100 TABLET ORAL DAILY
Qty: 90 TABLET | Refills: 1 | Status: SHIPPED | OUTPATIENT
Start: 2023-05-22

## 2023-05-22 NOTE — PROGRESS NOTES
"Subjective   Tiarra Becerra is a 58 y.o. female.     Chief Complaint   Patient presents with   • Hypertension       Visit Vitals  /78 (BP Location: Left arm, Patient Position: Sitting, Cuff Size: Adult)   Pulse 67   Temp 97.5 °F (36.4 °C)   Ht 167.6 cm (66\")   Wt 114 kg (251 lb)   LMP 01/02/2018   SpO2 97%   BMI 40.51 kg/m²         Hypertension  This is a chronic problem. The current episode started more than 1 year ago. The problem is unchanged. The problem is controlled. Pertinent negatives include no anxiety, blurred vision, chest pain, headaches, malaise/fatigue, neck pain, orthopnea, palpitations, peripheral edema, PND, shortness of breath or sweats. Agents associated with hypertension include thyroid hormones. Risk factors for coronary artery disease include obesity, post-menopausal state and family history. Current antihypertension treatment includes beta blockers and angiotensin blockers. The current treatment provides significant improvement. There are no compliance problems.  There is no history of angina, kidney disease, CAD/MI, CVA, heart failure, left ventricular hypertrophy, PVD or retinopathy. Identifiable causes of hypertension include a thyroid problem. There is no history of sleep apnea.   Hoarse  This is a new problem. The current episode started more than 1 month ago. The problem occurs constantly. The problem has been unchanged. Pertinent negatives include no abdominal pain, anorexia, arthralgias, change in bowel habit, chest pain, chills, congestion, coughing, diaphoresis, fatigue, fever, headaches, joint swelling, myalgias, nausea, neck pain, numbness, rash, sore throat, swollen glands, urinary symptoms, vertigo, visual change, vomiting or weakness. Nothing aggravates the symptoms. She has tried rest for the symptoms. The treatment provided no relief.   Hypothyroidism  This is a chronic (pt had thyroidectomy for thyroid CA) problem. The current episode started more than 1 month ago. " The problem occurs constantly. The problem has been unchanged. Pertinent negatives include no abdominal pain, anorexia, arthralgias, change in bowel habit, chest pain, chills, congestion, coughing, diaphoresis, fatigue, fever, headaches, joint swelling, myalgias, nausea, neck pain, numbness, rash, sore throat, swollen glands, urinary symptoms, vertigo, visual change, vomiting or weakness. Nothing aggravates the symptoms. Treatments tried: thyroid hormone. The treatment provided significant relief.      Pt reports that she had Covid in January.     Pt had URI over a month ago and became hoarse. Infection got better, but hoarseness persists.    Pt had thyroidectomy for cancer last yr. She was not hoarse after surgery.   Pt is seeing Dr Reyes for Endocrine, and thyroid refills.   The following portions of the patient's history were reviewed and updated as appropriate: allergies, current medications, past family history, past medical history, past social history, past surgical history and problem list.    Past Medical History:   Diagnosis Date   • Diverticulitis of colon    • Endometrial cancer 11/28/2018   • Essential hypertension 08/06/2018   • Hx of radiation therapy    • Mixed hyperlipidemia 9/8/2022   • Postoperative hypothyroidism 1/23/2023   • Thyroid cancer 1/23/2023    papillary      Past Surgical History:   Procedure Laterality Date   • COLONOSCOPY  11/25/2019    Dr Giraldo, tics, internal hemorrhoids   • D & C HYSTEROSCOPY MYOSURE  11/05/2018   • OOPHORECTOMY     • TOTAL LAPAROSCOPIC HYSTERECTOMY WITH DAVINCI ROBOT  11/30/2018    laproscopic with BSO, lymph node dissection, left parametrectomy, lysis ureters, Dr Jaffe. Cancer   • TOTAL LAPAROSCOPIC HYSTERECTOMY, LAPAROSCOPIC NODE DISSECTION N/A 11/30/2018    Procedure: TOTAL LAPAROSCOPIC HYSTERECTOMY WITH DAVINCI ROBOT, BILATERAL SALPINGO OOPHORECTOMY, WITH RADICAL LEFT PARAMETRECTOMY, BILATERAL UTERETAL LYSIS, PELVIC LYMPH NODE DISSECTION;  Surgeon:  April Jaffe MD;  Location: [facility];  Service: [facility]   • TOTAL THYROIDECTOMY  2022    Dr LILY Brown for left thyroid cancer   • TUBAL ABDOMINAL LIGATION        Family History   Problem Relation Age of Onset   • Arthritis Mother    • Heart attack Mother    • Cancer Father         prostate   • Heart attack Father    • Hypertension Father    • Stroke Father    • Diabetes Sister         type 1   • Diabetes Brother         type 1   • Stroke Paternal Grandmother    • Breast cancer Cousin 55        50's   • Ovarian cancer Neg Hx       Social History     Socioeconomic History   • Marital status:    Tobacco Use   • Smoking status: Former     Packs/day: 0.75     Years: 12.00     Pack years: 9.00     Types: Cigarettes     Quit date:      Years since quittin.4   • Smokeless tobacco: Never   Vaping Use   • Vaping Use: Never used   Substance and Sexual Activity   • Alcohol use: Yes     Comment: on occasion   • Drug use: No   • Sexual activity: Yes     Partners: Male      Allergies   Allergen Reactions   • Lisinopril Cough       Review of Systems   Constitutional: Negative for chills, diaphoresis, fatigue, fever and malaise/fatigue.   HENT: Positive for hoarse voice. Negative for congestion and sore throat.    Eyes: Negative for blurred vision.   Respiratory: Negative for cough and shortness of breath.    Cardiovascular: Negative for chest pain, palpitations, orthopnea and PND.   Gastrointestinal: Negative for abdominal pain, anorexia, change in bowel habit, nausea and vomiting.   Musculoskeletal: Negative for arthralgias, joint swelling, myalgias and neck pain.   Skin: Negative for rash.   Neurological: Negative for vertigo, weakness, numbness and headaches.       Objective   Physical Exam  Vitals and nursing note reviewed.   Constitutional:       Appearance: She is well-developed.   HENT:      Head: Normocephalic.      Right Ear: External ear normal.      Left Ear: External ear normal.       Nose: Nose normal.   Eyes:      General: Lids are normal.      Conjunctiva/sclera: Conjunctivae normal.      Pupils: Pupils are equal, round, and reactive to light.   Neck:      Thyroid: No thyroid mass or thyromegaly.      Vascular: No carotid bruit.      Trachea: Trachea normal.   Cardiovascular:      Rate and Rhythm: Normal rate and regular rhythm.      Heart sounds: No murmur heard.  Pulmonary:      Effort: Pulmonary effort is normal. No respiratory distress.      Breath sounds: Normal breath sounds. No decreased breath sounds, wheezing, rhonchi or rales.   Chest:      Chest wall: No tenderness.   Abdominal:      General: Bowel sounds are normal.      Palpations: Abdomen is soft.      Tenderness: There is no abdominal tenderness.   Musculoskeletal:         General: Normal range of motion.      Cervical back: Normal range of motion and neck supple.   Skin:     General: Skin is warm and dry.   Neurological:      Mental Status: She is alert and oriented to person, place, and time.   Psychiatric:         Behavior: Behavior normal.         Assessment & Plan   Diagnoses and all orders for this visit:    1. Essential hypertension (Primary)  -     losartan (COZAAR) 100 MG tablet; Take 1 tablet by mouth Daily.  Dispense: 90 tablet; Refill: 1  -     metoprolol succinate XL (TOPROL-XL) 50 MG 24 hr tablet; Take 1 tablet by mouth Daily.  Dispense: 90 tablet; Refill: 1  -     Comprehensive Metabolic Panel; Future  -     TSH; Future  -     Lipid Panel; Future  -     CBC & Differential; Future    2. Thyroid cancer  -     Ambulatory Referral to ENT (Otolaryngology)    3. Postoperative hypothyroidism  -     Ambulatory Referral to ENT (Otolaryngology)  -     TSH; Future  -     T4, Free; Future    4. Hoarse  -     Ambulatory Referral to ENT (Otolaryngology)    5. Need for COVID-19 vaccine  -     COVID-19 (Pfizer) Bivalent 12+yrs; Future      Pt will get Covid booster end of the week.              Current Outpatient Medications:   •   cetirizine (ZyrTEC) 10 MG tablet, Take 1 tablet by mouth Daily., Disp: , Rfl:   •  Cholecalciferol (VITAMIN D-3 PO), Take 2,000 Units by mouth Daily., Disp: , Rfl:   •  levothyroxine (SYNTHROID, LEVOTHROID) 125 MCG tablet, , Disp: , Rfl:   •  losartan (COZAAR) 100 MG tablet, Take 1 tablet by mouth Daily., Disp: 90 tablet, Rfl: 1  •  metoprolol succinate XL (TOPROL-XL) 50 MG 24 hr tablet, Take 1 tablet by mouth Daily., Disp: 90 tablet, Rfl: 1    Return in about 6 months (around 11/22/2023), or if symptoms worsen or fail to improve, for Annual, Recheck.

## 2023-05-25 ENCOUNTER — CLINICAL SUPPORT (OUTPATIENT)
Dept: INTERNAL MEDICINE | Facility: CLINIC | Age: 58
End: 2023-05-25
Payer: COMMERCIAL

## 2023-05-25 DIAGNOSIS — Z23 NEED FOR COVID-19 VACCINE: ICD-10-CM

## 2023-05-29 DIAGNOSIS — I10 ESSENTIAL HYPERTENSION: ICD-10-CM

## 2023-05-30 RX ORDER — METOPROLOL SUCCINATE 50 MG/1
TABLET, EXTENDED RELEASE ORAL
Qty: 90 TABLET | Refills: 1 | OUTPATIENT
Start: 2023-05-30

## 2023-05-30 RX ORDER — LOSARTAN POTASSIUM 100 MG/1
TABLET ORAL
Qty: 90 TABLET | Refills: 1 | OUTPATIENT
Start: 2023-05-30

## 2023-07-03 ENCOUNTER — TELEPHONE (OUTPATIENT)
Dept: GYNECOLOGIC ONCOLOGY | Facility: CLINIC | Age: 58
End: 2023-07-03

## 2023-07-03 NOTE — TELEPHONE ENCOUNTER
Caller: Tiarra Becerra    Relationship: Self    Best call back number: [phone]     What is the best time to reach you: ANYTIME    Who are you requesting to speak with (clinical staff, provider,  specific staff member): CLINICAL    What was the call regarding: PT WAS DOING SELF BREAST EXAM AND FELT A LUMP TODAY (07/03/23). PT WOULD LIKE TO KNOW WHETHER TO MAKE AND APPT WITH PCP OR TO COME IN TO OUR OFFICE. PT DOES HAVE AN APPT WITH US 07/25/23.    PLEASE CALL PT BACK.

## 2023-08-03 ENCOUNTER — LAB (OUTPATIENT)
Dept: INTERNAL MEDICINE | Facility: CLINIC | Age: 58
End: 2023-08-03
Payer: COMMERCIAL

## 2023-08-03 ENCOUNTER — HOSPITAL ENCOUNTER (OUTPATIENT)
Dept: GENERAL RADIOLOGY | Facility: HOSPITAL | Age: 58
Discharge: HOME | End: 2023-08-03
Admitting: FAMILY MEDICINE
Payer: COMMERCIAL

## 2023-08-03 ENCOUNTER — OFFICE VISIT (OUTPATIENT)
Dept: INTERNAL MEDICINE | Facility: CLINIC | Age: 58
End: 2023-08-03
Payer: COMMERCIAL

## 2023-08-03 VITALS
WEIGHT: 257 LBS | TEMPERATURE: 97.8 F | OXYGEN SATURATION: 98 % | DIASTOLIC BLOOD PRESSURE: 86 MMHG | BODY MASS INDEX: 41.3 KG/M2 | HEIGHT: 66 IN | SYSTOLIC BLOOD PRESSURE: 128 MMHG | HEART RATE: 66 BPM

## 2023-08-03 DIAGNOSIS — M25.571 ACUTE RIGHT ANKLE PAIN: ICD-10-CM

## 2023-08-03 DIAGNOSIS — C54.1 ENDOMETRIAL CANCER: ICD-10-CM

## 2023-08-03 DIAGNOSIS — M25.571 ACUTE RIGHT ANKLE PAIN: Primary | ICD-10-CM

## 2023-08-03 DIAGNOSIS — Z80.9 FAMILY HISTORY OF CANCER: ICD-10-CM

## 2023-08-03 DIAGNOSIS — C73 THYROID CANCER: ICD-10-CM

## 2023-08-03 LAB
CHROMATIN AB SERPL-ACNC: <10 IU/ML (ref 0–14)
ERYTHROCYTE [SEDIMENTATION RATE] IN BLOOD: 4 MM/HR (ref 0–30)
URATE SERPL-MCNC: 4.9 MG/DL (ref 2.4–5.7)

## 2023-08-03 PROCEDURE — 85652 RBC SED RATE AUTOMATED: CPT | Performed by: FAMILY MEDICINE

## 2023-08-03 PROCEDURE — 99213 OFFICE O/P EST LOW 20 MIN: CPT | Performed by: FAMILY MEDICINE

## 2023-08-03 PROCEDURE — 73610 X-RAY EXAM OF ANKLE: CPT

## 2023-08-03 PROCEDURE — 86431 RHEUMATOID FACTOR QUANT: CPT | Performed by: FAMILY MEDICINE

## 2023-08-03 PROCEDURE — 84550 ASSAY OF BLOOD/URIC ACID: CPT | Performed by: FAMILY MEDICINE

## 2023-08-03 PROCEDURE — 86038 ANTINUCLEAR ANTIBODIES: CPT | Performed by: FAMILY MEDICINE

## 2023-08-03 RX ORDER — NAPROXEN 500 MG/1
500 TABLET ORAL 2 TIMES DAILY WITH MEALS
Qty: 60 TABLET | Refills: 1 | Status: SHIPPED | OUTPATIENT
Start: 2023-08-03

## 2023-08-03 RX ORDER — METHYLPREDNISOLONE 4 MG/1
TABLET ORAL
Qty: 21 EACH | Refills: 0 | Status: SHIPPED | OUTPATIENT
Start: 2023-08-03 | End: 2023-08-03

## 2023-08-07 LAB — ANA SER QL IF: NEGATIVE

## 2023-08-10 ENCOUNTER — TELEPHONE (OUTPATIENT)
Dept: INTERNAL MEDICINE | Facility: CLINIC | Age: 58
End: 2023-08-10
Payer: COMMERCIAL

## 2023-08-10 DIAGNOSIS — M25.571 PAIN AND SWELLING OF RIGHT ANKLE: Primary | ICD-10-CM

## 2023-08-10 DIAGNOSIS — M25.471 PAIN AND SWELLING OF RIGHT ANKLE: Primary | ICD-10-CM

## 2023-08-10 NOTE — TELEPHONE ENCOUNTER
PN of results.  I read the letters that Dr. Cope sent.  She asked if she should see a specialist (maybe a podiatrist for her ankle) she is still having pain and swelling. I let her know that Dr. Cope is out of office and will return on Monday.  I could forward to another provider or hold until she returns.  She said to hold till she returns because pt is going out of town and will be back next week.

## 2023-08-10 NOTE — TELEPHONE ENCOUNTER
PATIENT HAS CALLED REQUESTING A CALL BACK ASAP FROM SOMEONE TO GO OVER LAB AND XRAY RESULTS FROM LAST WEEK.     CALL BACK NUMBER [phone]

## 2023-08-18 ENCOUNTER — TELEPHONE (OUTPATIENT)
Dept: INTERNAL MEDICINE | Facility: CLINIC | Age: 58
End: 2023-08-18

## 2023-08-18 DIAGNOSIS — M25.571 RIGHT ANKLE PAIN, UNSPECIFIED CHRONICITY: Primary | ICD-10-CM

## 2023-08-18 NOTE — TELEPHONE ENCOUNTER
Spoke with patient regarding Genetic testing referral. Per communication note in referral, that office is backed up with referrals and advised patient can contact to get scheduled. Gave patient contact info     Patient is also requesting a referral for the pain in her ankle. Not sure if it needs to be for ortho or podiatry

## 2023-08-18 NOTE — TELEPHONE ENCOUNTER
Hub staff attempted to follow warm transfer process and was unsuccessful     Caller: Tiarra Becerra    Relationship to patient: Self    Best call back number: [phone]    Patient is needing: AN UPDATE ON REFERRALS FOR GENETIC TESTING AND ISSUES WITH HER ANKLE

## 2023-08-24 NOTE — TELEPHONE ENCOUNTER
PN. She stated understanding and will finish the antibiotic   Bilobed Transposition Flap Text: The defect edges were debeveled with a #15 scalpel blade.  Given the location of the defect and the proximity to free margins a bilobed transposition flap was deemed most appropriate.  Using a sterile surgical marker, an appropriate bilobe flap drawn around the defect.    The area thus outlined was incised deep to adipose tissue with a #15 scalpel blade.  The skin margins were undermined to an appropriate distance in all directions utilizing iris scissors.

## 2023-08-30 ENCOUNTER — HOSPITAL ENCOUNTER (OUTPATIENT)
Dept: ULTRASOUND IMAGING | Facility: HOSPITAL | Age: 58
Discharge: HOME | End: 2023-08-30
Payer: COMMERCIAL

## 2023-08-30 ENCOUNTER — HOSPITAL ENCOUNTER (OUTPATIENT)
Dept: MAMMOGRAPHY | Facility: HOSPITAL | Age: 58
Discharge: HOME | End: 2023-08-30
Payer: COMMERCIAL

## 2023-08-30 DIAGNOSIS — N63.14 MASS OF LOWER INNER QUADRANT OF RIGHT BREAST: ICD-10-CM

## 2023-08-30 PROCEDURE — 77065 DX MAMMO INCL CAD UNI: CPT

## 2023-08-30 PROCEDURE — 76642 ULTRASOUND BREAST LIMITED: CPT

## 2023-08-30 PROCEDURE — G0279 TOMOSYNTHESIS, MAMMO: HCPCS

## 2023-09-29 ENCOUNTER — TRANSCRIBE ORDERS (OUTPATIENT)
Dept: ADMINISTRATIVE | Facility: HOSPITAL | Age: 58
End: 2023-09-29
Payer: COMMERCIAL

## 2023-09-29 DIAGNOSIS — Z12.31 VISIT FOR SCREENING MAMMOGRAM: Primary | ICD-10-CM

## 2023-11-10 ENCOUNTER — HOSPITAL ENCOUNTER (OUTPATIENT)
Dept: MAMMOGRAPHY | Facility: HOSPITAL | Age: 58
Discharge: HOME | End: 2023-11-10
Admitting: FAMILY MEDICINE
Payer: COMMERCIAL

## 2023-11-10 DIAGNOSIS — Z12.31 VISIT FOR SCREENING MAMMOGRAM: ICD-10-CM

## 2023-11-10 PROCEDURE — 77063 BREAST TOMOSYNTHESIS BI: CPT

## 2023-11-10 PROCEDURE — 77067 SCR MAMMO BI INCL CAD: CPT

## 2023-11-20 ENCOUNTER — OFFICE VISIT (OUTPATIENT)
Dept: INTERNAL MEDICINE | Facility: CLINIC | Age: 58
End: 2023-11-20
Payer: COMMERCIAL

## 2023-11-20 ENCOUNTER — LAB (OUTPATIENT)
Dept: INTERNAL MEDICINE | Facility: CLINIC | Age: 58
End: 2023-11-20
Payer: COMMERCIAL

## 2023-11-20 VITALS
OXYGEN SATURATION: 97 % | WEIGHT: 256 LBS | SYSTOLIC BLOOD PRESSURE: 128 MMHG | HEIGHT: 66 IN | DIASTOLIC BLOOD PRESSURE: 78 MMHG | TEMPERATURE: 97.3 F | BODY MASS INDEX: 41.14 KG/M2 | HEART RATE: 58 BPM

## 2023-11-20 DIAGNOSIS — Z23 NEED FOR VACCINATION: ICD-10-CM

## 2023-11-20 DIAGNOSIS — I10 ESSENTIAL HYPERTENSION: ICD-10-CM

## 2023-11-20 DIAGNOSIS — R53.83 OTHER FATIGUE: ICD-10-CM

## 2023-11-20 DIAGNOSIS — I10 ESSENTIAL HYPERTENSION: Primary | ICD-10-CM

## 2023-11-20 LAB
ALBUMIN SERPL-MCNC: 4.5 G/DL (ref 3.5–5.2)
ALBUMIN/GLOB SERPL: 2.4 G/DL
ALP SERPL-CCNC: 100 U/L (ref 39–117)
ALT SERPL W P-5'-P-CCNC: 27 U/L (ref 1–33)
ANION GAP SERPL CALCULATED.3IONS-SCNC: 10 MMOL/L (ref 5–15)
AST SERPL-CCNC: 16 U/L (ref 1–32)
BILIRUB SERPL-MCNC: 0.4 MG/DL (ref 0–1.2)
BUN SERPL-MCNC: 14 MG/DL (ref 6–20)
BUN/CREAT SERPL: 19.2 (ref 7–25)
CALCIUM SPEC-SCNC: 9.7 MG/DL (ref 8.6–10.5)
CHLORIDE SERPL-SCNC: 102 MMOL/L (ref 98–107)
CHOLEST SERPL-MCNC: 251 MG/DL (ref 0–200)
CO2 SERPL-SCNC: 27 MMOL/L (ref 22–29)
CREAT SERPL-MCNC: 0.73 MG/DL (ref 0.57–1)
EGFRCR SERPLBLD CKD-EPI 2021: 94.9 ML/MIN/1.73
GLOBULIN UR ELPH-MCNC: 1.9 GM/DL
GLUCOSE SERPL-MCNC: 92 MG/DL (ref 65–99)
HDLC SERPL-MCNC: 54 MG/DL (ref 40–60)
LDLC SERPL CALC-MCNC: 148 MG/DL (ref 0–100)
LDLC/HDLC SERPL: 2.65 {RATIO}
POTASSIUM SERPL-SCNC: 4.4 MMOL/L (ref 3.5–5.2)
PROT SERPL-MCNC: 6.4 G/DL (ref 6–8.5)
SODIUM SERPL-SCNC: 139 MMOL/L (ref 136–145)
TRIGL SERPL-MCNC: 270 MG/DL (ref 0–150)
VLDLC SERPL-MCNC: 49 MG/DL (ref 5–40)

## 2023-11-20 PROCEDURE — 80061 LIPID PANEL: CPT | Performed by: FAMILY MEDICINE

## 2023-11-20 PROCEDURE — 36415 COLL VENOUS BLD VENIPUNCTURE: CPT | Performed by: FAMILY MEDICINE

## 2023-11-20 PROCEDURE — 80053 COMPREHEN METABOLIC PANEL: CPT | Performed by: FAMILY MEDICINE

## 2023-11-20 RX ORDER — METOPROLOL SUCCINATE 50 MG/1
50 TABLET, EXTENDED RELEASE ORAL DAILY
Qty: 90 TABLET | Refills: 1 | Status: SHIPPED | OUTPATIENT
Start: 2023-11-20

## 2023-11-20 RX ORDER — LOSARTAN POTASSIUM 100 MG/1
100 TABLET ORAL DAILY
Qty: 90 TABLET | Refills: 1 | Status: SHIPPED | OUTPATIENT
Start: 2023-11-20

## 2023-11-20 NOTE — PROGRESS NOTES
"Subjective   Tiarra Becerra is a 58 y.o. female.     Chief Complaint   Patient presents with    Hypertension       Visit Vitals  /78 (BP Location: Right arm, Patient Position: Sitting, Cuff Size: Large Adult)   Pulse 58   Temp 97.3 °F (36.3 °C)   Ht 167.6 cm (66\")   Wt 116 kg (256 lb)   LMP 01/02/2018   SpO2 97%   BMI 41.32 kg/m²         Hypertension  This is a chronic problem. The current episode started more than 1 year ago. The problem is unchanged. The problem is controlled. Associated symptoms include malaise/fatigue (chronic). Pertinent negatives include no anxiety, blurred vision, chest pain, headaches, neck pain, orthopnea, palpitations, peripheral edema, PND, shortness of breath or sweats. Agents associated with hypertension include thyroid hormones. Risk factors for coronary artery disease include family history, post-menopausal state and obesity. Current antihypertension treatment includes angiotensin blockers and beta blockers. There are no compliance problems.  There is no history of kidney disease, CAD/MI, CVA, heart failure, left ventricular hypertrophy, PVD or retinopathy. Identifiable causes of hypertension include a thyroid problem. There is no history of sleep apnea.   Fatigue  This is a chronic problem. The current episode started more than 1 year ago. The problem occurs constantly. The problem has been unchanged. Associated symptoms include fatigue. Pertinent negatives include no abdominal pain, anorexia, arthralgias, change in bowel habit, chest pain, chills, congestion, coughing, diaphoresis, fever, headaches, joint swelling, myalgias, nausea, neck pain, numbness, rash, sore throat, swollen glands, urinary symptoms, vertigo, visual change, vomiting or weakness. Nothing aggravates the symptoms. She has tried nothing for the symptoms. The treatment provided no relief.      Pt needs flu and Covid vaccine.   Discussed getting RSV after her birthday.   Pt has hypothyroidism post " thyroidectomy for thyroid cancer. Dr Reyes -Endocrine is following thyroid lab.     Pt has had fatigue for 20 yrs and blood work has not shown anything. Pt has not had  sleep study.   The following portions of the patient's history were reviewed and updated as appropriate: allergies, current medications, past family history, past medical history, past social history, past surgical history, and problem list.    Past Medical History:   Diagnosis Date    Diverticulitis of colon     Endometrial cancer 11/28/2018    Essential hypertension 08/06/2018    Hx of radiation therapy     Mixed hyperlipidemia 9/8/2022    Postoperative hypothyroidism 1/23/2023    Thyroid cancer 1/23/2023    papillary      Past Surgical History:   Procedure Laterality Date    COLONOSCOPY  11/25/2019    Dr Giraldo, tics, internal hemorrhoids    D & C HYSTEROSCOPY MYOSURE  11/05/2018    OOPHORECTOMY      TOTAL LAPAROSCOPIC HYSTERECTOMY WITH DAVINCI ROBOT  11/30/2018    laproscopic with BSO, lymph node dissection, left parametrectomy, lysis ureters, Dr Jaffe. Cancer    TOTAL LAPAROSCOPIC HYSTERECTOMY, LAPAROSCOPIC NODE DISSECTION N/A 11/30/2018    Procedure: TOTAL LAPAROSCOPIC HYSTERECTOMY WITH DAVINCI ROBOT, BILATERAL SALPINGO OOPHORECTOMY, WITH RADICAL LEFT PARAMETRECTOMY, BILATERAL UTERETAL LYSIS, PELVIC LYMPH NODE DISSECTION;  Surgeon: April Jaffe MD;  Location: [facility];  Service: [facility]    TOTAL THYROIDECTOMY  12/13/2022    Dr LILY Brown for left thyroid cancer    TUBAL ABDOMINAL LIGATION  2006      Family History   Problem Relation Age of Onset    Arthritis Mother     Heart attack Mother     Cancer Father         prostate    Heart attack Father     Hypertension Father     Stroke Father     Diabetes Sister         type 1    Diabetes Brother         type 1    Gout Brother     Other Brother         glioblastoma    Stroke Paternal Grandmother     Breast cancer Cousin 55        50's    Ovarian cancer Neg Hx       Social History      Socioeconomic History    Marital status:    Tobacco Use    Smoking status: Former     Packs/day: 0.75     Years: 12.00     Additional pack years: 0.00     Total pack years: 9.00     Types: Cigarettes     Quit date:      Years since quittin.9    Smokeless tobacco: Never   Vaping Use    Vaping Use: Never used   Substance and Sexual Activity    Alcohol use: Yes     Comment: on occasion    Drug use: No    Sexual activity: Yes     Partners: Male      Allergies   Allergen Reactions    Lisinopril Cough       Review of Systems   Constitutional:  Positive for fatigue and malaise/fatigue (chronic). Negative for chills, diaphoresis and fever.   HENT:  Negative for congestion and sore throat.    Eyes:  Negative for blurred vision.   Respiratory:  Negative for cough and shortness of breath.    Cardiovascular:  Negative for chest pain, palpitations, orthopnea and PND.   Gastrointestinal:  Negative for abdominal pain, anorexia, change in bowel habit, nausea and vomiting.   Musculoskeletal:  Negative for arthralgias, joint swelling, myalgias and neck pain.   Skin:  Negative for rash.   Neurological:  Negative for vertigo, weakness, numbness and headaches.       Objective   Physical Exam  Vitals and nursing note reviewed.   Constitutional:       Appearance: She is well-developed.   HENT:      Head: Normocephalic.      Right Ear: External ear normal.      Left Ear: External ear normal.      Nose: Nose normal.   Eyes:      General: Lids are normal.      Conjunctiva/sclera: Conjunctivae normal.      Pupils: Pupils are equal, round, and reactive to light.   Neck:      Thyroid: No thyroid mass or thyromegaly.      Vascular: No carotid bruit.      Trachea: Trachea normal.        Comments: Thyroidectomy scar ant neck  Cardiovascular:      Rate and Rhythm: Normal rate and regular rhythm.      Heart sounds: No murmur heard.  Pulmonary:      Effort: Pulmonary effort is normal. No respiratory distress.      Breath sounds:  Normal breath sounds. No decreased breath sounds, wheezing, rhonchi or rales.   Chest:      Chest wall: No tenderness.   Abdominal:      General: Bowel sounds are normal.      Palpations: Abdomen is soft.      Tenderness: There is no abdominal tenderness.   Musculoskeletal:         General: Normal range of motion.      Cervical back: Normal range of motion and neck supple.   Skin:     General: Skin is warm and dry.   Neurological:      Mental Status: She is alert and oriented to person, place, and time.   Psychiatric:         Behavior: Behavior normal.         Assessment & Plan   Problems Addressed this Visit          Cardiac and Vasculature    Essential hypertension - Primary    Relevant Medications    losartan (COZAAR) 100 MG tablet    metoprolol succinate XL (TOPROL-XL) 50 MG 24 hr tablet    Other Relevant Orders    Comprehensive Metabolic Panel    Lipid Panel     Other Visit Diagnoses       Need for vaccination        Relevant Orders    COVID-19 F23 (Pfizer) 12yrs+ (COMIRNATY) (Completed)    Fluzone >6 Months (5784-5789) (Completed)    Other fatigue        Relevant Orders    Ambulatory Referral to Sleep Medicine          Diagnoses         Codes Comments    Essential hypertension    -  Primary ICD-10-CM: I10  ICD-9-CM: 401.9     Need for vaccination     ICD-10-CM: Z23  ICD-9-CM: V05.9     Other fatigue     ICD-10-CM: R53.83  ICD-9-CM: 780.79                        Current Outpatient Medications:     cetirizine (ZyrTEC) 10 MG tablet, Take 1 tablet by mouth Daily., Disp: , Rfl:     Cholecalciferol (VITAMIN D-3 PO), Take 2,000 Units by mouth Daily., Disp: , Rfl:     levothyroxine (SYNTHROID, LEVOTHROID) 125 MCG tablet, , Disp: , Rfl:     losartan (COZAAR) 100 MG tablet, Take 1 tablet by mouth Daily., Disp: 90 tablet, Rfl: 1    metoprolol succinate XL (TOPROL-XL) 50 MG 24 hr tablet, Take 1 tablet by mouth Daily., Disp: 90 tablet, Rfl: 1    Return in 6 months (on 5/20/2024), or if symptoms worsen or fail to improve,  for Annual, Recheck.

## 2023-11-21 ENCOUNTER — TELEPHONE (OUTPATIENT)
Dept: INTERNAL MEDICINE | Facility: CLINIC | Age: 58
End: 2023-11-21
Payer: COMMERCIAL

## 2023-11-21 NOTE — TELEPHONE ENCOUNTER
----- Message from Fallon RAY MD sent at 11/21/2023 11:29 AM EST -----  Please call the patient regarding her abnormal result. Cholesterol and triglycerides are high, not improving with low cholesterol, low triglyceride diet.  Need to consider statins

## 2023-11-30 DIAGNOSIS — I10 ESSENTIAL HYPERTENSION: ICD-10-CM

## 2023-12-01 RX ORDER — LOSARTAN POTASSIUM 100 MG/1
100 TABLET ORAL DAILY
Qty: 90 TABLET | Refills: 1 | OUTPATIENT
Start: 2023-12-01

## 2024-02-06 ENCOUNTER — OFFICE VISIT (OUTPATIENT)
Dept: SLEEP MEDICINE | Facility: CLINIC | Age: 59
End: 2024-02-06
Payer: COMMERCIAL

## 2024-02-06 VITALS
HEIGHT: 66 IN | SYSTOLIC BLOOD PRESSURE: 132 MMHG | DIASTOLIC BLOOD PRESSURE: 80 MMHG | HEART RATE: 75 BPM | WEIGHT: 257 LBS | BODY MASS INDEX: 41.3 KG/M2 | TEMPERATURE: 97.8 F | OXYGEN SATURATION: 98 %

## 2024-02-06 DIAGNOSIS — G47.19 EXCESSIVE DAYTIME SLEEPINESS: ICD-10-CM

## 2024-02-06 DIAGNOSIS — I10 ESSENTIAL HYPERTENSION: Primary | ICD-10-CM

## 2024-02-06 DIAGNOSIS — R29.818 SUSPECTED SLEEP APNEA: ICD-10-CM

## 2024-02-06 DIAGNOSIS — R00.2 PALPITATIONS: ICD-10-CM

## 2024-02-06 NOTE — PROGRESS NOTES
Chief Complaint:   Chief Complaint   Patient presents with    Sleeping Problem    Daytime Sleepiness       HPI:    Tiarra Becerra is a 58 y.o. female here to establish care.  Patient sees Fallon Cope MD for primary care.  Patient has a history of high blood triglycerides, mixed hyperlipidemia, hypertension, postoperative hypothyroidism, obesity, endometrial cancer, thyroid cancer, and suspected sleep apnea.    Patient states for greater than 5 years she does have snoring, excessive daytime sleepiness, morning headaches 2-3 times a month heartburn, nasal allergies, teeth grinding, frequent nighttime urination, pain at night, and abnormal heart rhythm.  Patient states her palpitations have been better since removing her thyroid.  She does deny nasal fracture, head injury, hypnagogic hallucinations and sleep paralysis.    During the week patient will go to bed at 10 PM and get up at 5:45 AM, weekend patient going to bed at 10 PM waking at 6:30 AM.  She estimates getting 7 hours of sleep nightly.  She will go to sleep within 15 minutes.  Patient will get up 1-2 times in the night to use the restroom.  She will take a daily 30-minute nap.  Patient has an Charlotte score of 12/24.    We did speak today about the consequences of untreated sleep apnea such as hypertension, atrial fibrillation, stroke, early onset dementia and sudden cardiac death.  We also discussed different therapies available to her such as CPAP, MAD, or ENT referral.  Patient verbalizes understanding.    Social history  This very pleasant 58-year-old female.  Patient works in [employer].  Patient is a non-smoker and will have an alcoholic beverage 2-4 times a month.  She will drink 2 to 3 cups of regular coffee daily.    Family History   Problem Relation Age of Onset    Arthritis Mother     Heart attack Mother     Cancer Father         prostate    Heart attack Father     Hypertension Father     Stroke Father     Diabetes Sister         type 1     Diabetes Brother         type 1    Gout Brother     Other Brother         glioblastoma    Stroke Paternal Grandmother     Breast cancer Cousin 55        50's    Ovarian cancer Neg Hx      Past Surgical History:   Procedure Laterality Date    COLONOSCOPY  11/25/2019    Dr Giraldo, tics, internal hemorrhoids    D & C HYSTEROSCOPY MYOSURE  11/05/2018    OOPHORECTOMY      TOTAL LAPAROSCOPIC HYSTERECTOMY WITH DAVINCI ROBOT  11/30/2018    laproscopic with BSO, lymph node dissection, left parametrectomy, lysis ureters, Dr Jaffe. Cancer    TOTAL LAPAROSCOPIC HYSTERECTOMY, LAPAROSCOPIC NODE DISSECTION N/A 11/30/2018    Procedure: TOTAL LAPAROSCOPIC HYSTERECTOMY WITH DAVINCI ROBOT, BILATERAL SALPINGO OOPHORECTOMY, WITH RADICAL LEFT PARAMETRECTOMY, BILATERAL UTERETAL LYSIS, PELVIC LYMPH NODE DISSECTION;  Surgeon: April Jaffe MD;  Location: [facility];  Service: DaVinci    TOTAL THYROIDECTOMY  12/13/2022    Dr LILY Brown for left thyroid cancer    TUBAL ABDOMINAL LIGATION  2006           Current medications are:   Current Outpatient Medications:     cetirizine (ZyrTEC) 10 MG tablet, Take 1 tablet by mouth Daily., Disp: , Rfl:     Cholecalciferol (VITAMIN D-3 PO), Take 2,000 Units by mouth Daily., Disp: , Rfl:     levothyroxine (SYNTHROID, LEVOTHROID) 125 MCG tablet, , Disp: , Rfl:     losartan (COZAAR) 100 MG tablet, Take 1 tablet by mouth Daily., Disp: 90 tablet, Rfl: 1    metoprolol succinate XL (TOPROL-XL) 50 MG 24 hr tablet, Take 1 tablet by mouth Daily., Disp: 90 tablet, Rfl: 1.      The patient's relevant past medical, surgical, family and social history were reviewed and updated in Epic as appropriate.       Review of Systems   Constitutional:  Positive for fatigue.   HENT:  Positive for postnasal drip, rhinorrhea, sneezing and tinnitus.    Eyes:  Positive for itching.   Cardiovascular:  Positive for palpitations.   Genitourinary:  Positive for frequency and urgency.   Allergic/Immunologic: Positive for  environmental allergies and food allergies.   Neurological:  Positive for dizziness, light-headedness and headaches.   Psychiatric/Behavioral:  Positive for sleep disturbance.    All other systems reviewed and are negative.        Objective:    Physical Exam  Constitutional:       Appearance: Normal appearance.   HENT:      Head: Normocephalic and atraumatic.      Mouth/Throat:      Mouth: Mucous membranes are moist.      Pharynx: Oropharynx is clear.      Comments: Mallampati 1 anatomy  Cardiovascular:      Rate and Rhythm: Normal rate and regular rhythm.   Skin:     General: Skin is warm and dry.   Neurological:      Mental Status: She is alert and oriented to person, place, and time.   Psychiatric:         Mood and Affect: Mood normal.         Behavior: Behavior normal.         Thought Content: Thought content normal.         Judgment: Judgment normal.           ASSESSMENT/PLAN    Diagnoses and all orders for this visit:    1. Essential hypertension (Primary)  -     Home Sleep Study; Future    2. Body mass index (BMI) 35.0-35.9, adult  -     Home Sleep Study; Future    3. Excessive daytime sleepiness  -     Home Sleep Study; Future    4. Suspected sleep apnea  -     Home Sleep Study; Future    5. Palpitations  -     Home Sleep Study; Future        Counseled patient regarding multimodal approach with healthy nutrition, healthy sleep, regular physical activity, social activities, counseling, and medications. Encouraged to practice lateral sleep position. Avoid alcohol and sedatives close to bedtime.    Patient certainly has a strong story for sleep apnea and due to the consequences of untreated sleep apnea we will move forward with HST and follow-up as appropriate.      Signed by  LIANNA Rosa    February 6, 2024      CC: Fallon Cope MD Harrison, Wanda V, MD

## 2024-02-16 ENCOUNTER — HOSPITAL ENCOUNTER (OUTPATIENT)
Dept: SLEEP MEDICINE | Facility: HOSPITAL | Age: 59
Discharge: HOME | End: 2024-02-16
Admitting: NURSE PRACTITIONER
Payer: COMMERCIAL

## 2024-02-16 VITALS — HEIGHT: 65 IN | WEIGHT: 257 LBS | BODY MASS INDEX: 42.82 KG/M2

## 2024-02-16 DIAGNOSIS — I10 ESSENTIAL HYPERTENSION: ICD-10-CM

## 2024-02-16 DIAGNOSIS — R29.818 SUSPECTED SLEEP APNEA: ICD-10-CM

## 2024-02-16 DIAGNOSIS — R00.2 PALPITATIONS: ICD-10-CM

## 2024-02-16 DIAGNOSIS — G47.19 EXCESSIVE DAYTIME SLEEPINESS: ICD-10-CM

## 2024-02-16 PROCEDURE — 95800 SLP STDY UNATTENDED: CPT

## 2024-02-19 DIAGNOSIS — I10 ESSENTIAL HYPERTENSION: ICD-10-CM

## 2024-02-19 DIAGNOSIS — G47.33 OSA (OBSTRUCTIVE SLEEP APNEA): Primary | ICD-10-CM

## 2024-02-21 ENCOUNTER — TELEPHONE (OUTPATIENT)
Dept: GENETICS | Facility: HOSPITAL | Age: 59
End: 2024-02-21
Payer: COMMERCIAL

## 2024-02-21 NOTE — TELEPHONE ENCOUNTER
Called pt to remind them of their upcoming genetic counseling appt. Pt was unable to review her family history at this time but appt for Monday was confirmed.

## 2024-02-26 ENCOUNTER — LAB (OUTPATIENT)
Dept: LAB | Facility: HOSPITAL | Age: 59
End: 2024-02-26
Payer: COMMERCIAL

## 2024-02-26 ENCOUNTER — CLINICAL SUPPORT (OUTPATIENT)
Dept: GENETICS | Facility: HOSPITAL | Age: 59
End: 2024-02-26
Payer: COMMERCIAL

## 2024-02-26 DIAGNOSIS — Z85.42 HISTORY OF ENDOMETRIAL CANCER: Primary | ICD-10-CM

## 2024-02-26 DIAGNOSIS — Z85.850 HISTORY OF THYROID CANCER: ICD-10-CM

## 2024-02-26 DIAGNOSIS — Z13.79 GENETIC TESTING: Primary | ICD-10-CM

## 2024-02-26 DIAGNOSIS — Z80.3 FAMILY HISTORY OF BREAST CANCER: ICD-10-CM

## 2024-02-26 DIAGNOSIS — Z80.42 FAMILY HISTORY OF PROSTATE CANCER: ICD-10-CM

## 2024-02-26 DIAGNOSIS — Z13.79 GENETIC TESTING: ICD-10-CM

## 2024-02-26 DIAGNOSIS — Z80.8 FAMILY HISTORY OF BRAIN CANCER: ICD-10-CM

## 2024-02-26 PROCEDURE — 96040: CPT | Performed by: GENETIC COUNSELOR, MS

## 2024-02-28 NOTE — PROGRESS NOTES
Tiarra Becerra, a 59-year-old female, was referred for genetic counseling due to a family history of cancer.  Ms. Becerra is postmenopausal and a PERCY-BSO at age 54 due to endometrial cancer.  IHC screening for MMR deficiency was completed on uterine tumor specimen and was normal.  She also has a history of papillary thyroid cancer at age 58.  She was 10 years old at menarche and was 23 when she had her first child. Her most recent screening colonoscopy was in 2022 and no polyps reported.  Ms. Becerra was interested in discussing her risk for a hereditary cancer syndrome.   Ms. Becerra decided to pursue comprehensive genetic testing to evaluate her risk of cancer, therefore the CancerNext panel was ordered through [employer] which analyzes 34 genes associated with hereditary cancer risk.  Blood was drawn today at [facility], and results are expected in approximately 2-3 weeks.    PERTINENT FAMILY HISTORY: (See attached pedigree)   Father:  Prostate cancer    3 colon polyps  Pat. 1st cousin: Breast cancer, 50s  Great-Nephew: Nasopharyngeal cancer, 13  Mat. Half-Brother: Brain cancer, 71  Daughter: 2-3 polyps, age 24    RISK ASSESSMENT:  Ms. Becerra's family history raised the question of a hereditary cancer syndrome.  We discussed BRCA1/2 testing based on paternal history breast cancer and prostate cancer.  Her father's prostate cancer was not known to be metastatic, but did require a prostatectomy.  We also discussed PTEN briefly due to the history of thyroid cancer and endometrial cancer.  Based on the presence of other clinically significant hereditary cancer related genes, the standard approach to hereditary cancer genetic testing at this time is via multi-gene panel, which evaluates for BRCA1/2 as well as several additional genes associated with a hereditary risk for cancer. If genetic testing is negative, management should be guided by a family history-based risk assessment.    GENETIC COUNSELING (40 minutes):  We  reviewed the family history information in detail. Cases of cancer follow three general patterns: sporadic, familial, and hereditary.  While most cancer is sporadic, some cases appear to occur in family clusters.  These cases are said to be familial and account for 10-20% of cancer cases.  Familial cases may be due to a combination of shared genes and environmental factors among family members.  In even fewer cases (5-10%), the risk for cancer is inherited, and the genes responsible for the increased cancer risk are known.      Family histories typical of hereditary cancer syndromes usually include multiple first- and second-degree relatives diagnosed with cancer types that define a syndrome.  These cases tend to be diagnosed at younger-than-expected ages and can be bilateral or multifocal.  The cancer in these families follows an autosomal dominant inheritance pattern, which indicates the likely presence of a mutation in a cancer susceptibility gene.  Children and siblings of an individual believed to carry this mutation have a 50% chance of inheriting that mutation, thereby inheriting the increased risk to develop cancer.  These mutations can be passed down from the maternal or the paternal lineage.    Based on the family history of prostate and breast cancer, we discussed BRCA1/2.  A significant proportion (50%) of hereditary breast and ovarian cancer can be attributed to mutations in the BRCA1 and BRCA2 genes.  Mutations in these genes confer an increased risk for breast cancer, ovarian cancer, male breast cancer, prostate cancer, and pancreatic cancer.  Women with a BRCA1 or BRCA2 mutation have up to an 87% lifetime risk of breast cancer and up to a 44% risk of ovarian cancer. There are other clinically significant breast cancer related genes, as well as other, more rare, hereditary cancer syndromes. We also discussed Wheat syndrome and Cowden (PTEN) syndrome based on the combination of cancers in the family.   The degree of risk varies by gene, and the screening recommendations vary by degree of risk. Based on Ms. Becerra's family history and her desire to get more information regarding her personal risks, testing was pursued through a multigene panel evaluating several other genes known to increase the risk for cancer.    GENETIC TESTING:  The risks, benefits, and limitations of genetic testing and implications for clinical management following testing were reviewed.  DNA test results can influence decisions regarding screening and prevention.  Genetic testing can have significant psychological implications for both individuals and families. Also discussed was the possibility of insurance discrimination based on genetic test results and the laws in place to prevent this, as well as the limitations of these laws.      We discussed panel testing, which would involve testing 34 genes associated with hereditary cancer risk. The implications of a positive or negative test result were discussed.  We also discussed the importance of testing on an affected relative. In general, a negative genetic test result is most informative if a mutation has first been established in an affected member of the family.  In cases where an affected individual is not available or interested in testing, it is appropriate to offer testing to an unaffected individual. We discussed the possibility that, in some cases, genetic test results may be ambiguous due to the identification of a genetic variant. These variants may or may not be associated with an increased cancer risk. With multigene panel testing, it is not uncommon for a variant of uncertain significance (VUS) to be identified.  If a VUS is identified, testing family members is typically not recommended and screening recommendations are made based on the family history.  The laboratories that perform genetic testing work to reclassify the VUS and send out an amended report if and when a VUS is  reclassified.  The majority of variant findings are ultimately reclassified to a negative result. Given her family history, a negative test result does not eliminate all cancer risk, although the risk would not be as high as it would with positive genetic testing.     PLAN: Genetic testing via the CancerNext panel through [facility] was ordered and results are expected in 2-3 weeks.  We will contact Ms. Becerra with her results once they are received.     Fallon Fernandez MS, [facility] [place], [facility]  Licensed Certified Genetic Counselor

## 2024-03-14 ENCOUNTER — TELEPHONE (OUTPATIENT)
Dept: GENETICS | Facility: HOSPITAL | Age: 59
End: 2024-03-14
Payer: COMMERCIAL

## 2024-03-14 ENCOUNTER — DOCUMENTATION (OUTPATIENT)
Dept: GENETICS | Facility: HOSPITAL | Age: 59
End: 2024-03-14

## 2024-03-14 NOTE — TELEPHONE ENCOUNTER
Spoke with patient and disclosed negative genetic results. Informed patient these results would be on [employer] and sent to her Dr. Patient requested a copy be mailed to her.

## 2024-03-15 NOTE — PROGRESS NOTES
Tiarra Becerra, a 59-year-old female, was referred for genetic counseling due to a family history of cancer.  Ms. Becerra is postmenopausal and a PERCY-BSO at age 54 due to endometrial cancer.  IHC screening for MMR deficiency was completed on uterine tumor specimen and was normal.  She also has a history of papillary thyroid cancer at age 58.  She was 10 years old at menarche and was 23 when she had her first child. Her most recent screening colonoscopy was in 2022 and no polyps reported.  She has annual mammograms, and has not had a breast biopsy. Ms. Becerra was interested in discussing her risk for a hereditary cancer syndrome.   Ms. Becerra decided to pursue comprehensive genetic testing to evaluate her risk of cancer, therefore the CancerNext panel was ordered through [employer] which analyzes 34 genes associated with hereditary cancer risk.  Genetic testing was negative for pathogenic germline mutations in BRCA1/2 and 32 additional genes on the CancerNext panel.  These normal results were discussed with Ms. Becerra by telephone on 3/14/24.      PERTINENT FAMILY HISTORY: (See attached pedigree)   Father:   Prostate cancer     3 colon polyps  Pat. 1st cousin: Breast cancer, 50s  Great-Nephew: Nasopharyngeal cancer, 13  Mat. Half-Brother: Brain cancer, 71  Daughter:  2-3 polyps, age 24    RISK ASSESSMENT:  Ms. Becerra's family history raised the question of a hereditary cancer syndrome.  We discussed BRCA1/2 testing based on paternal history breast cancer and prostate cancer.  Her father's prostate cancer was not known to be metastatic, but did require a prostatectomy.  We also discussed PTEN briefly due to the history of thyroid cancer and endometrial cancer.  Based on the presence of other clinically significant hereditary cancer related genes, the standard approach to hereditary cancer genetic testing at this time is via multi-gene panel, which evaluates for BRCA1/2 as well as several additional genes associated with a  hereditary risk for cancer. If genetic testing is negative, management should be guided by a family history-based risk assessment.    GENETIC COUNSELING:  We reviewed the family history information in detail. Cases of cancer follow three general patterns: sporadic, familial, and hereditary.  While most cancer is sporadic, some cases appear to occur in family clusters.  These cases are said to be familial and account for 10-20% of cancer cases.  Familial cases may be due to a combination of shared genes and environmental factors among family members.  In even fewer cases (5-10%), the risk for cancer is inherited, and the genes responsible for the increased cancer risk are known.      Family histories typical of hereditary cancer syndromes usually include multiple first- and second-degree relatives diagnosed with cancer types that define a syndrome.  These cases tend to be diagnosed at younger-than-expected ages and can be bilateral or multifocal.  The cancer in these families follows an autosomal dominant inheritance pattern, which indicates the likely presence of a mutation in a cancer susceptibility gene.  Children and siblings of an individual believed to carry this mutation have a 50% chance of inheriting that mutation, thereby inheriting the increased risk to develop cancer.  These mutations can be passed down from the maternal or the paternal lineage.    Based on the paternal family history of prostate and breast cancer, we discussed BRCA1/2.  A significant proportion (50%) of hereditary breast and ovarian cancer can be attributed to mutations in the BRCA1 and BRCA2 genes.  Mutations in these genes confer an increased risk for breast cancer, ovarian cancer, male breast cancer, prostate cancer, and pancreatic cancer.  Women with a BRCA1 or BRCA2 mutation have up to an 87% lifetime risk of breast cancer and up to a 44% risk of ovarian cancer. There are other clinically significant breast cancer related genes, as  well as other, more rare, hereditary cancer syndromes. We also discussed Wheat syndrome and Cowden (PTEN) syndrome based on the combination of cancers in the family.  Genetic testing is typically completed via a multigene panel, and the degree of risk varies by gene, and the screening recommendations vary by degree of risk.  Based on Ms. Becerra's family history and her desire to get more information regarding her personal risks, testing was pursued through a multigene panel evaluating several other genes known to increase the risk for cancer.    GENETIC TESTING:  The risks, benefits, and limitations of genetic testing and implications for clinical management following testing were reviewed.  DNA test results can influence decisions regarding screening and prevention.  Genetic testing can have significant psychological implications for both individuals and families. Also discussed was the possibility of insurance discrimination based on genetic test results and the laws in place to prevent this, as well as the limitations of these laws.      We discussed panel testing, which would involve testing 34 genes associated with hereditary cancer risk. The implications of a positive or negative test result were discussed.  We also discussed the importance of testing on an affected relative. In general, a negative genetic test result is most informative if a mutation has first been established in an affected member of the family.  In cases where an affected individual is not available or interested in testing, it is appropriate to offer testing to an unaffected individual. We discussed the possibility that, in some cases, genetic test results may be ambiguous due to the identification of a genetic variant. These variants may or may not be associated with an increased cancer risk. With multigene panel testing, it is not uncommon for a variant of uncertain significance (VUS) to be identified.  If a VUS is identified, testing  family members is typically not recommended and screening recommendations are made based on the family history.  The laboratories that perform genetic testing work to reclassify the VUS and send out an amended report if and when a VUS is reclassified.  The majority of variant findings are ultimately reclassified to a negative result. Given her family history, a negative test result does not eliminate all cancer risk, although the risk would not be as high as it would with positive genetic testing.     TEST RESULTS:  Genetic testing was negative for pathogenic germline mutations in the 34 genes included on the CancerNext panel (see attached results). This negative result greatly lowers, but does not eliminate, the risk of a hereditary cancer syndrome.  There is the possibility of genes that have yet to be described that contribute to cancer risk, as well as the possibility of a combination of genetic and environmental factors that contribute to risk. This assessment is based on the information provided at time of consultation.      PLAN: Genetic counseling remains available to Ms. Becerra. She is welcome to contact our office with any questions at [phone].       Fallon Fernandez MS, [facility] [place], [facility]  Licensed Certified Genetic Counselor    Cc: Tiarra Cope MD

## 2024-05-20 ENCOUNTER — OFFICE VISIT (OUTPATIENT)
Dept: INTERNAL MEDICINE | Facility: CLINIC | Age: 59
End: 2024-05-20
Payer: COMMERCIAL

## 2024-05-20 ENCOUNTER — LAB (OUTPATIENT)
Dept: INTERNAL MEDICINE | Facility: CLINIC | Age: 59
End: 2024-05-20
Payer: COMMERCIAL

## 2024-05-20 VITALS
HEIGHT: 66 IN | SYSTOLIC BLOOD PRESSURE: 128 MMHG | TEMPERATURE: 96.9 F | OXYGEN SATURATION: 99 % | DIASTOLIC BLOOD PRESSURE: 78 MMHG | HEART RATE: 53 BPM | WEIGHT: 240.8 LBS | BODY MASS INDEX: 38.7 KG/M2

## 2024-05-20 DIAGNOSIS — E89.0 POSTOPERATIVE HYPOTHYROIDISM: ICD-10-CM

## 2024-05-20 DIAGNOSIS — I10 ESSENTIAL HYPERTENSION: ICD-10-CM

## 2024-05-20 DIAGNOSIS — Z00.00 ANNUAL PHYSICAL EXAM: ICD-10-CM

## 2024-05-20 DIAGNOSIS — E78.2 MIXED HYPERLIPIDEMIA: ICD-10-CM

## 2024-05-20 DIAGNOSIS — E66.01 CLASS 2 SEVERE OBESITY WITH SERIOUS COMORBIDITY AND BODY MASS INDEX (BMI) OF 38.0 TO 38.9 IN ADULT, UNSPECIFIED OBESITY TYPE: ICD-10-CM

## 2024-05-20 DIAGNOSIS — Z00.00 ANNUAL PHYSICAL EXAM: Primary | ICD-10-CM

## 2024-05-20 LAB
ALBUMIN SERPL-MCNC: 4.2 G/DL (ref 3.5–5.2)
ALBUMIN/GLOB SERPL: 1.8 G/DL
ALP SERPL-CCNC: 107 U/L (ref 39–117)
ALT SERPL W P-5'-P-CCNC: 19 U/L (ref 1–33)
ANION GAP SERPL CALCULATED.3IONS-SCNC: 9.4 MMOL/L (ref 5–15)
AST SERPL-CCNC: 16 U/L (ref 1–32)
BASOPHILS # BLD AUTO: 0.05 10*3/MM3 (ref 0–0.2)
BASOPHILS NFR BLD AUTO: 0.9 % (ref 0–1.5)
BILIRUB BLD-MCNC: NEGATIVE MG/DL
BILIRUB SERPL-MCNC: 0.4 MG/DL (ref 0–1.2)
BUN SERPL-MCNC: 13 MG/DL (ref 8–23)
BUN/CREAT SERPL: 18.1 (ref 7–25)
CALCIUM SPEC-SCNC: 9.2 MG/DL (ref 8.6–10.5)
CHLORIDE SERPL-SCNC: 104 MMOL/L (ref 98–107)
CHOLEST SERPL-MCNC: 210 MG/DL (ref 0–200)
CLARITY, POC: CLEAR
CO2 SERPL-SCNC: 25.6 MMOL/L (ref 22–29)
COLOR UR: YELLOW
CREAT SERPL-MCNC: 0.72 MG/DL (ref 0.57–1)
DEPRECATED RDW RBC AUTO: 40.5 FL (ref 37–54)
EGFRCR SERPLBLD CKD-EPI 2021: 95.9 ML/MIN/1.73
EOSINOPHIL # BLD AUTO: 0.17 10*3/MM3 (ref 0–0.4)
EOSINOPHIL NFR BLD AUTO: 3.1 % (ref 0.3–6.2)
ERYTHROCYTE [DISTWIDTH] IN BLOOD BY AUTOMATED COUNT: 13 % (ref 12.3–15.4)
EXPIRATION DATE: NORMAL
GLOBULIN UR ELPH-MCNC: 2.3 GM/DL
GLUCOSE SERPL-MCNC: 95 MG/DL (ref 65–99)
GLUCOSE UR STRIP-MCNC: NEGATIVE MG/DL
HCT VFR BLD AUTO: 41.5 % (ref 34–46.6)
HDLC SERPL-MCNC: 55 MG/DL (ref 40–60)
HGB BLD-MCNC: 13.6 G/DL (ref 12–15.9)
IMM GRANULOCYTES # BLD AUTO: 0.01 10*3/MM3 (ref 0–0.05)
IMM GRANULOCYTES NFR BLD AUTO: 0.2 % (ref 0–0.5)
KETONES UR QL: NEGATIVE
LDLC SERPL CALC-MCNC: 116 MG/DL (ref 0–100)
LDLC/HDLC SERPL: 2 {RATIO}
LEUKOCYTE EST, POC: NEGATIVE
LYMPHOCYTES # BLD AUTO: 1.09 10*3/MM3 (ref 0.7–3.1)
LYMPHOCYTES NFR BLD AUTO: 19.6 % (ref 19.6–45.3)
Lab: NORMAL
MCH RBC QN AUTO: 27.9 PG (ref 26.6–33)
MCHC RBC AUTO-ENTMCNC: 32.8 G/DL (ref 31.5–35.7)
MCV RBC AUTO: 85 FL (ref 79–97)
MONOCYTES # BLD AUTO: 0.46 10*3/MM3 (ref 0.1–0.9)
MONOCYTES NFR BLD AUTO: 8.3 % (ref 5–12)
NEUTROPHILS NFR BLD AUTO: 3.77 10*3/MM3 (ref 1.7–7)
NEUTROPHILS NFR BLD AUTO: 67.9 % (ref 42.7–76)
NITRITE UR-MCNC: NEGATIVE MG/ML
NRBC BLD AUTO-RTO: 0 /100 WBC (ref 0–0.2)
PH UR: 6 [PH] (ref 5–8)
PLATELET # BLD AUTO: 172 10*3/MM3 (ref 140–450)
PMV BLD AUTO: 11 FL (ref 6–12)
POTASSIUM SERPL-SCNC: 4.2 MMOL/L (ref 3.5–5.2)
PROT SERPL-MCNC: 6.5 G/DL (ref 6–8.5)
PROT UR STRIP-MCNC: NEGATIVE MG/DL
RBC # BLD AUTO: 4.88 10*6/MM3 (ref 3.77–5.28)
RBC # UR STRIP: NEGATIVE /UL
SODIUM SERPL-SCNC: 139 MMOL/L (ref 136–145)
SP GR UR: 1.03 (ref 1–1.03)
T3FREE SERPL-MCNC: 3.01 PG/ML (ref 2–4.4)
T4 FREE SERPL-MCNC: 1.59 NG/DL (ref 0.93–1.7)
TRIGL SERPL-MCNC: 226 MG/DL (ref 0–150)
TSH SERPL DL<=0.05 MIU/L-ACNC: 0.03 UIU/ML (ref 0.27–4.2)
UROBILINOGEN UR QL: NORMAL
VLDLC SERPL-MCNC: 39 MG/DL (ref 5–40)
WBC NRBC COR # BLD AUTO: 5.55 10*3/MM3 (ref 3.4–10.8)

## 2024-05-20 PROCEDURE — 80061 LIPID PANEL: CPT | Performed by: FAMILY MEDICINE

## 2024-05-20 PROCEDURE — 80050 GENERAL HEALTH PANEL: CPT | Performed by: FAMILY MEDICINE

## 2024-05-20 PROCEDURE — 81003 URINALYSIS AUTO W/O SCOPE: CPT | Performed by: FAMILY MEDICINE

## 2024-05-20 PROCEDURE — 84481 FREE ASSAY (FT-3): CPT | Performed by: FAMILY MEDICINE

## 2024-05-20 PROCEDURE — 99396 PREV VISIT EST AGE 40-64: CPT | Performed by: FAMILY MEDICINE

## 2024-05-20 PROCEDURE — 36415 COLL VENOUS BLD VENIPUNCTURE: CPT | Performed by: FAMILY MEDICINE

## 2024-05-20 PROCEDURE — 84439 ASSAY OF FREE THYROXINE: CPT | Performed by: FAMILY MEDICINE

## 2024-05-20 RX ORDER — LOSARTAN POTASSIUM 100 MG/1
100 TABLET ORAL DAILY
Qty: 90 TABLET | Refills: 1 | Status: SHIPPED | OUTPATIENT
Start: 2024-05-20

## 2024-05-20 RX ORDER — METOPROLOL SUCCINATE 50 MG/1
50 TABLET, EXTENDED RELEASE ORAL DAILY
Qty: 90 TABLET | Refills: 1 | Status: SHIPPED | OUTPATIENT
Start: 2024-05-20

## 2024-05-20 NOTE — PROGRESS NOTES
"Subjective   Tiarra Becerra is a 59 y.o. female.     Chief Complaint   Patient presents with    Annual Exam       Visit Vitals  /78 (BP Location: Left arm, Patient Position: Sitting, Cuff Size: Adult)   Pulse 53   Temp 96.9 °F (36.1 °C)   Ht 168.7 cm (66.4\")   Wt 109 kg (240 lb 12.8 oz)   LMP 01/02/2018   SpO2 99%   BMI 38.40 kg/m²       Wt Readings from Last 3 Encounters:   05/20/24 109 kg (240 lb 12.8 oz)   02/16/24 117 kg (257 lb)   02/06/24 117 kg (257 lb)         Hypertension  This is a chronic problem. The current episode started more than 1 year ago. The problem is unchanged. The problem is controlled. Pertinent negatives include no anxiety, blurred vision, chest pain, headaches, malaise/fatigue, neck pain, orthopnea, palpitations, peripheral edema, PND, shortness of breath or sweats. Agents associated with hypertension include thyroid hormones. Risk factors for coronary artery disease include dyslipidemia, family history, obesity and post-menopausal state. Past treatments include beta blockers and angiotensin blockers. Current antihypertension treatment includes beta blockers and angiotensin blockers. There are no compliance problems.  Identifiable causes of hypertension include sleep apnea (mild) and a thyroid problem.      Pt has hx of thyroid cancer that has been treated and is following up with Dr Reyes-Endocrine.     Pt has had elevated cholesterol and triglycerides in the past. Pt is watching her diet and exercising, up until last week when her left knee started hurting.    Pt states that she had genetic counseling for cancer and was ok.     Pt is up to date on mammogram, DEXA and colonoscopy.   The following portions of the patient's history were reviewed and updated as appropriate: allergies, current medications, past family history, past medical history, past social history, past surgical history, and problem list.    Past Medical History:   Diagnosis Date    Diverticulitis of colon     " Endometrial cancer 2018    Essential hypertension 2018    Hx of radiation therapy     Mixed hyperlipidemia 2022    Postoperative hypothyroidism 2023    Thyroid cancer 2023    papillary      Past Surgical History:   Procedure Laterality Date    COLONOSCOPY  2019    Dr Giraldo, tics, internal hemorrhoids    COLONOSCOPY  2022    Dr Giraldo 5 yr f/u    D & C HYSTEROSCOPY MYOSURE  2018    OOPHORECTOMY      TOTAL LAPAROSCOPIC HYSTERECTOMY WITH DAVINCI ROBOT  2018    laproscopic with BSO, lymph node dissection, left parametrectomy, lysis ureters, Dr Jaffe. Cancer    TOTAL LAPAROSCOPIC HYSTERECTOMY, LAPAROSCOPIC NODE DISSECTION N/A 2018    Procedure: TOTAL LAPAROSCOPIC HYSTERECTOMY WITH DAVINCI ROBOT, BILATERAL SALPINGO OOPHORECTOMY, WITH RADICAL LEFT PARAMETRECTOMY, BILATERAL UTERETAL LYSIS, PELVIC LYMPH NODE DISSECTION;  Surgeon: April Jaffe MD;  Location: [facility];  Service: DaVinci    TOTAL THYROIDECTOMY  2022    Dr LILY Brown for left thyroid cancer    TUBAL ABDOMINAL LIGATION  2006      Family History   Problem Relation Age of Onset    Arthritis Mother     Heart attack Mother     Cancer Father         prostate    Heart attack Father     Hypertension Father     Stroke Father     Colon polyps Father     Diabetes Sister         type 1    Diabetes Brother         type 1    Gout Brother     Other Brother         glioblastoma    Stroke Paternal Grandmother     Breast cancer Cousin 55        50's    Ovarian cancer Neg Hx       Social History     Socioeconomic History    Marital status:    Tobacco Use    Smoking status: Former     Current packs/day: 0.00     Average packs/day: 0.8 packs/day for 12.0 years (9.0 ttl pk-yrs)     Types: Cigarettes     Start date:      Quit date:      Years since quittin.4    Smokeless tobacco: Never   Vaping Use    Vaping status: Never Used   Substance and Sexual Activity    Alcohol use: Yes     Comment: on  occasion    Drug use: No    Sexual activity: Yes     Partners: Male      Allergies   Allergen Reactions    Lisinopril Cough       Review of Systems   Constitutional: Negative.  Negative for activity change, appetite change, chills, diaphoresis, fatigue, fever, malaise/fatigue and unexpected weight change.   HENT:  Positive for postnasal drip and rhinorrhea. Negative for congestion, dental problem, drooling, ear discharge, ear pain, facial swelling, hearing loss, mouth sores, nosebleeds, sinus pressure, sinus pain, sneezing, sore throat, tinnitus, trouble swallowing and voice change.    Eyes:  Positive for itching. Negative for blurred vision, photophobia, pain, discharge, redness and visual disturbance.   Respiratory: Negative.  Negative for apnea, cough, choking, chest tightness, shortness of breath, wheezing and stridor.    Cardiovascular: Negative.  Negative for chest pain, palpitations, orthopnea, leg swelling and PND.   Gastrointestinal: Negative.  Negative for abdominal distention, abdominal pain, anal bleeding, blood in stool, constipation, diarrhea, nausea, rectal pain and vomiting.   Endocrine: Negative.  Negative for cold intolerance, heat intolerance, polydipsia, polyphagia and polyuria.   Genitourinary: Negative.  Negative for decreased urine volume, difficulty urinating, dyspareunia, dysuria, enuresis, flank pain, frequency, genital sores, hematuria, menstrual problem, pelvic pain, urgency, vaginal bleeding, vaginal discharge and vaginal pain.   Musculoskeletal:  Positive for arthralgias (left knee). Negative for back pain, gait problem, joint swelling, myalgias, neck pain and neck stiffness.   Skin: Negative.  Negative for color change, pallor, rash and wound.   Allergic/Immunologic: Positive for environmental allergies. Negative for food allergies and immunocompromised state.   Neurological: Negative.  Negative for dizziness, tremors, seizures, syncope, facial asymmetry, speech difficulty, weakness,  light-headedness, numbness and headaches.   Hematological: Negative.  Negative for adenopathy. Does not bruise/bleed easily.   Psychiatric/Behavioral: Negative.  Negative for agitation, behavioral problems, confusion, decreased concentration, dysphoric mood, hallucinations, self-injury, sleep disturbance and suicidal ideas. The patient is not nervous/anxious and is not hyperactive.      PHQ-2 Depression Screening  Little interest or pleasure in doing things? 0-->not at all   Feeling down, depressed, or hopeless? 0-->not at all   PHQ-2 Total Score 0       Objective   Physical Exam  Vitals and nursing note reviewed.   Constitutional:       General: She is not in acute distress.     Appearance: She is well-developed. She is not diaphoretic.   HENT:      Head: Normocephalic and atraumatic.      Right Ear: Tympanic membrane, ear canal and external ear normal.      Left Ear: Tympanic membrane, ear canal and external ear normal.      Nose: Nose normal.      Mouth/Throat:      Lips: Pink.      Mouth: Mucous membranes are moist. Mucous membranes are not pale, not dry and not cyanotic. No injury.      Dentition: Normal dentition.      Tongue: No lesions.      Palate: No mass.      Pharynx: Uvula midline. No pharyngeal swelling, oropharyngeal exudate, posterior oropharyngeal erythema or uvula swelling.   Eyes:      General: Lids are normal. No scleral icterus.        Right eye: No foreign body, discharge or hordeolum.         Left eye: No foreign body, discharge or hordeolum.      Extraocular Movements:      Right eye: Normal extraocular motion and no nystagmus.      Left eye: Normal extraocular motion and no nystagmus.      Conjunctiva/sclera: Conjunctivae normal.      Right eye: Right conjunctiva is not injected. No chemosis, exudate or hemorrhage.     Left eye: Left conjunctiva is not injected. No chemosis, exudate or hemorrhage.     Pupils: Pupils are equal, round, and reactive to light.   Neck:      Thyroid: No thyroid  mass or thyromegaly.      Vascular: No carotid bruit.      Trachea: Trachea normal. No tracheal deviation.   Cardiovascular:      Rate and Rhythm: Normal rate and regular rhythm.      Pulses:           Dorsalis pedis pulses are 2+ on the right side and 2+ on the left side.        Posterior tibial pulses are 2+ on the right side and 2+ on the left side.      Heart sounds: Normal heart sounds. No murmur heard.     No friction rub. No gallop.   Pulmonary:      Effort: Pulmonary effort is normal. No respiratory distress.      Breath sounds: Normal breath sounds. No decreased breath sounds, wheezing, rhonchi or rales.   Chest:      Chest wall: No tenderness. There is no dullness to percussion.   Breasts:     Pritesh Score is 5.      Breasts are symmetrical.      Right: Normal. No swelling, bleeding, inverted nipple, mass, nipple discharge, skin change or tenderness.      Left: Normal. No swelling, bleeding, inverted nipple, mass, nipple discharge, skin change or tenderness.   Abdominal:      General: Bowel sounds are normal. There is no distension.      Palpations: Abdomen is soft. There is no hepatomegaly, splenomegaly or mass.      Tenderness: There is no abdominal tenderness. There is no guarding or rebound.      Hernia: No hernia is present.   Musculoskeletal:         General: No tenderness or deformity. Normal range of motion.      Cervical back: Normal range of motion and neck supple.      Right lower leg: No edema.      Left lower leg: No edema.   Lymphadenopathy:      Head:      Right side of head: No submandibular adenopathy.      Left side of head: No submandibular adenopathy.      Cervical: No cervical adenopathy.      Right cervical: No superficial, deep or posterior cervical adenopathy.     Left cervical: No superficial, deep or posterior cervical adenopathy.      Upper Body:      Right upper body: No supraclavicular, axillary or pectoral adenopathy.      Left upper body: No supraclavicular, axillary or  pectoral adenopathy.   Skin:     General: Skin is warm and dry.      Findings: No rash.      Nails: There is no clubbing.   Neurological:      Mental Status: She is alert and oriented to person, place, and time.      Cranial Nerves: No cranial nerve deficit.      Sensory: No sensory deficit.      Coordination: Coordination normal.      Deep Tendon Reflexes: Reflexes are normal and symmetric.      Reflex Scores:       Bicep reflexes are 2+ on the right side and 2+ on the left side.       Brachioradialis reflexes are 2+ on the right side and 2+ on the left side.       Patellar reflexes are 2+ on the right side and 2+ on the left side.  Psychiatric:         Attention and Perception: Attention and perception normal.         Mood and Affect: Mood and affect normal.         Speech: Speech normal.         Behavior: Behavior normal.         Thought Content: Thought content normal.         Cognition and Memory: Cognition and memory normal.         Judgment: Judgment normal.         Assessment & Plan   Problems Addressed this Visit          Cardiac and Vasculature    Essential hypertension    Relevant Medications    metoprolol succinate XL (TOPROL-XL) 50 MG 24 hr tablet    losartan (COZAAR) 100 MG tablet    Other Relevant Orders    Comprehensive Metabolic Panel (Completed)    TSH (Completed)    Lipid Panel (Completed)    CBC & Differential (Completed)    Mixed hyperlipidemia    Relevant Orders    Comprehensive Metabolic Panel (Completed)    Lipid Panel (Completed)       Endocrine and Metabolic    Postoperative hypothyroidism    Relevant Medications    metoprolol succinate XL (TOPROL-XL) 50 MG 24 hr tablet    Other Relevant Orders    TSH (Completed)    T4, Free (Completed)    T3, Free (Completed)     Other Visit Diagnoses       Annual physical exam    -  Primary    Relevant Orders    POCT urinalysis dipstick, automated (Completed)    Comprehensive Metabolic Panel (Completed)    TSH (Completed)    Lipid Panel (Completed)    CBC  & Differential (Completed)    Class 2 severe obesity with serious comorbidity and body mass index (BMI) of 38.0 to 38.9 in adult, unspecified obesity type              Diagnoses         Codes Comments    Annual physical exam    -  Primary ICD-10-CM: Z00.00  ICD-9-CM: V70.0     Essential hypertension     ICD-10-CM: I10  ICD-9-CM: 401.9     Mixed hyperlipidemia     ICD-10-CM: E78.2  ICD-9-CM: 272.2     Postoperative hypothyroidism     ICD-10-CM: E89.0  ICD-9-CM: 244.0     Class 2 severe obesity with serious comorbidity and body mass index (BMI) of 38.0 to 38.9 in adult, unspecified obesity type     ICD-10-CM: E66.01, Z68.38  ICD-9-CM: 278.01, V85.38           Discussed RSV vaccine.    Please follow a low animal fat diet that is also low in sugar, low in junk food, low in sweet drinks and low in alcohol.  Please increase the amount of fiber in your diet as well as increasing your daily exercise, such as walking.    Class 2 Severe Obesity (BMI >=35 and <=39.9). Obesity-related health conditions include the following: obstructive sleep apnea, hypertension, and dyslipidemias. Obesity is improving with lifestyle modifications. BMI is is above average; BMI management plan is completed. We discussed portion control and increasing exercise.            Current Outpatient Medications:     cetirizine (ZyrTEC) 10 MG tablet, Take 1 tablet by mouth Daily., Disp: , Rfl:     Cholecalciferol (VITAMIN D-3 PO), Take 2,000 Units by mouth Daily., Disp: , Rfl:     levothyroxine (SYNTHROID, LEVOTHROID) 125 MCG tablet, , Disp: , Rfl:     losartan (COZAAR) 100 MG tablet, Take 1 tablet by mouth Daily., Disp: 90 tablet, Rfl: 1    metoprolol succinate XL (TOPROL-XL) 50 MG 24 hr tablet, Take 1 tablet by mouth Daily., Disp: 90 tablet, Rfl: 1    Return in about 6 months (around 11/20/2024), or if symptoms worsen or fail to improve, for Recheck bp and lipids.    Recent Results (from the past 168 hour(s))   Comprehensive Metabolic Panel    Collection  Time: 05/20/24  9:25 AM    Specimen: Arm, Right; Blood   Result Value Ref Range    Glucose 95 65 - 99 mg/dL    BUN 13 8 - 23 mg/dL    Creatinine 0.72 0.57 - 1.00 mg/dL    Sodium 139 136 - 145 mmol/L    Potassium 4.2 3.5 - 5.2 mmol/L    Chloride 104 98 - 107 mmol/L    CO2 25.6 22.0 - 29.0 mmol/L    Calcium 9.2 8.6 - 10.5 mg/dL    Total Protein 6.5 6.0 - 8.5 g/dL    Albumin 4.2 3.5 - 5.2 g/dL    ALT (SGPT) 19 1 - 33 U/L    AST (SGOT) 16 1 - 32 U/L    Alkaline Phosphatase 107 39 - 117 U/L    Total Bilirubin 0.4 0.0 - 1.2 mg/dL    Globulin 2.3 gm/dL    A/G Ratio 1.8 g/dL    BUN/Creatinine Ratio 18.1 7.0 - 25.0    Anion Gap 9.4 5.0 - 15.0 mmol/L    eGFR 95.9 >60.0 mL/min/1.73   TSH    Collection Time: 05/20/24  9:25 AM    Specimen: Arm, Right; Blood   Result Value Ref Range    TSH 0.026 (L) 0.270 - 4.200 uIU/mL   T4, Free    Collection Time: 05/20/24  9:25 AM    Specimen: Arm, Right; Blood   Result Value Ref Range    Free T4 1.59 0.93 - 1.70 ng/dL   T3, Free    Collection Time: 05/20/24  9:25 AM    Specimen: Arm, Right; Blood   Result Value Ref Range    T3, Free 3.01 2.00 - 4.40 pg/mL   Lipid Panel    Collection Time: 05/20/24  9:25 AM    Specimen: Arm, Right; Blood   Result Value Ref Range    Total Cholesterol 210 (H) 0 - 200 mg/dL    Triglycerides 226 (H) 0 - 150 mg/dL    HDL Cholesterol 55 40 - 60 mg/dL    LDL Cholesterol  116 (H) 0 - 100 mg/dL    VLDL Cholesterol 39 5 - 40 mg/dL    LDL/HDL Ratio 2.00    CBC Auto Differential    Collection Time: 05/20/24  9:25 AM    Specimen: Arm, Right; Blood   Result Value Ref Range    WBC 5.55 3.40 - 10.80 10*3/mm3    RBC 4.88 3.77 - 5.28 10*6/mm3    Hemoglobin 13.6 12.0 - 15.9 g/dL    Hematocrit 41.5 34.0 - 46.6 %    MCV 85.0 79.0 - 97.0 fL    MCH 27.9 26.6 - 33.0 pg    MCHC 32.8 31.5 - 35.7 g/dL    RDW 13.0 12.3 - 15.4 %    RDW-SD 40.5 37.0 - 54.0 fl    MPV 11.0 6.0 - 12.0 fL    Platelets 172 140 - 450 10*3/mm3    Neutrophil % 67.9 42.7 - 76.0 %    Lymphocyte % 19.6 19.6 - 45.3 %     Monocyte % 8.3 5.0 - 12.0 %    Eosinophil % 3.1 0.3 - 6.2 %    Basophil % 0.9 0.0 - 1.5 %    Immature Grans % 0.2 0.0 - 0.5 %    Neutrophils, Absolute 3.77 1.70 - 7.00 10*3/mm3    Lymphocytes, Absolute 1.09 0.70 - 3.10 10*3/mm3    Monocytes, Absolute 0.46 0.10 - 0.90 10*3/mm3    Eosinophils, Absolute 0.17 0.00 - 0.40 10*3/mm3    Basophils, Absolute 0.05 0.00 - 0.20 10*3/mm3    Immature Grans, Absolute 0.01 0.00 - 0.05 10*3/mm3    nRBC 0.0 0.0 - 0.2 /100 WBC   POCT urinalysis dipstick, automated    Collection Time: 05/20/24  9:26 AM    Specimen: Urine   Result Value Ref Range    Color Yellow Yellow, Straw, Dark Yellow, Sushila    Clarity, UA Clear Clear    Specific Gravity  1.030 1.005 - 1.030    pH, Urine 6.0 5.0 - 8.0    Leukocytes Negative Negative    Nitrite, UA Negative Negative    Protein, POC Negative Negative mg/dL    Glucose, UA Negative Negative mg/dL    Ketones, UA Negative Negative    Urobilinogen, UA Normal Normal, 0.2 E.U./dL    Bilirubin Negative Negative    Blood, UA Negative Negative    Lot Number [id number]     Expiration Date 1/14/2025

## 2024-05-21 ENCOUNTER — TELEPHONE (OUTPATIENT)
Dept: INTERNAL MEDICINE | Facility: CLINIC | Age: 59
End: 2024-05-21
Payer: COMMERCIAL

## 2024-05-29 DIAGNOSIS — I10 ESSENTIAL HYPERTENSION: ICD-10-CM

## 2024-05-30 RX ORDER — LOSARTAN POTASSIUM 100 MG/1
100 TABLET ORAL DAILY
Qty: 90 TABLET | Refills: 1 | OUTPATIENT
Start: 2024-05-30

## 2024-05-30 RX ORDER — METOPROLOL SUCCINATE 50 MG/1
50 TABLET, EXTENDED RELEASE ORAL DAILY
Qty: 90 TABLET | Refills: 1 | OUTPATIENT
Start: 2024-05-30

## 2024-07-08 ENCOUNTER — TELEPHONE (OUTPATIENT)
Dept: INTERNAL MEDICINE | Facility: CLINIC | Age: 59
End: 2024-07-08
Payer: COMMERCIAL

## 2024-07-08 DIAGNOSIS — M25.562 CHRONIC PAIN OF LEFT KNEE: Primary | ICD-10-CM

## 2024-07-08 DIAGNOSIS — G89.29 CHRONIC PAIN OF LEFT KNEE: Primary | ICD-10-CM

## 2024-07-08 NOTE — TELEPHONE ENCOUNTER
Patient has been notified that ortho referral has been placed.  Someone should call her in 7-14 days.  Patient verbalized understanding.

## 2024-07-08 NOTE — TELEPHONE ENCOUNTER
Caller: Tiarra Becerra    Relationship: Self    Best call back number: [phone]     What is the medical concern/diagnosis: PAIN SINCE MAY, HAPPENED WHILE EXERCISING, SOMETHING SNAPPED AND NEVER FELT RIGHT AGAIN.     What specialty or service is being requested: KNEE SPECIALIST    What is the provider, practice or medical service name: DOES NOT CARE WHERE PRACTICE IS LOCATED.    Any additional details: NO

## 2024-07-08 NOTE — TELEPHONE ENCOUNTER
Called patient to schedule an appointment she said discussed this with the provider at there appointment in May.  She was advised to rest knee but it is not better.  Would like a referral to ortho.

## 2024-07-11 ENCOUNTER — OFFICE VISIT (OUTPATIENT)
Dept: ORTHOPEDIC SURGERY | Facility: CLINIC | Age: 59
End: 2024-07-11
Payer: COMMERCIAL

## 2024-07-11 VITALS
SYSTOLIC BLOOD PRESSURE: 132 MMHG | BODY MASS INDEX: 36.64 KG/M2 | DIASTOLIC BLOOD PRESSURE: 78 MMHG | HEIGHT: 66 IN | WEIGHT: 228 LBS

## 2024-07-11 DIAGNOSIS — M25.562 LEFT KNEE PAIN, UNSPECIFIED CHRONICITY: ICD-10-CM

## 2024-07-11 DIAGNOSIS — M17.12 PRIMARY OSTEOARTHRITIS OF LEFT KNEE: Primary | ICD-10-CM

## 2024-07-11 PROCEDURE — 99204 OFFICE O/P NEW MOD 45 MIN: CPT | Performed by: ORTHOPAEDIC SURGERY

## 2024-07-11 RX ORDER — MELOXICAM 15 MG/1
TABLET ORAL
Qty: 90 TABLET | Refills: 1 | Status: SHIPPED | OUTPATIENT
Start: 2024-07-11

## 2024-07-11 NOTE — PROGRESS NOTES
Orthopaedic Clinic Note: Knee New Patient    Chief Complaint   Patient presents with    Left Knee - Pain        HPI    Tiarra Becerra is a 59 y.o. female who presents with left knee pain for 2 month(s). Onset atraumatic and gradual in nature. Pain is localized to the medial joint line, anterior knee and is a 3/10 on the pain scale. Pain is described as throbbing and shooting. Associated symptoms include pain. The pain is worse with walking, standing, and climbing stairs; resting and sitting make it better. Previous treatments have included: NSAIDS since symptom onset. Although some transient relief was reported with these interventions, these conservative measures have failed and symptoms have persisted. The patient is limited in daily activities and has had a significant decrease in quality of life as a result. She denies fevers, chills, or constitutional symptoms.    I have reviewed the following portions of the patient's history:History of Present Illness    Past Medical History:   Diagnosis Date    Diverticulitis of colon     Endometrial cancer 11/28/2018    Essential hypertension 08/06/2018    Hx of radiation therapy     Mixed hyperlipidemia 9/8/2022    Postoperative hypothyroidism 1/23/2023    Thyroid cancer 1/23/2023    papillary      Past Surgical History:   Procedure Laterality Date    COLONOSCOPY  11/25/2019    Dr Giraldo, tics, internal hemorrhoids    COLONOSCOPY  06/27/2022    Dr Giraldo 5 yr f/u    D & C HYSTEROSCOPY MYOSURE  11/05/2018    OOPHORECTOMY      TOTAL LAPAROSCOPIC HYSTERECTOMY WITH DAVINCI ROBOT  11/30/2018    laproscopic with BSO, lymph node dissection, left parametrectomy, lysis ureters, Dr Jaffe. Cancer    TOTAL LAPAROSCOPIC HYSTERECTOMY, LAPAROSCOPIC NODE DISSECTION N/A 11/30/2018    Procedure: TOTAL LAPAROSCOPIC HYSTERECTOMY WITH DAVINCI ROBOT, BILATERAL SALPINGO OOPHORECTOMY, WITH RADICAL LEFT PARAMETRECTOMY, BILATERAL UTERETAL LYSIS, PELVIC LYMPH NODE DISSECTION;  Surgeon: Ld  April ZAMORA MD;  Location: [facility];  Service: DaVinci    TOTAL THYROIDECTOMY  2022    Dr LILY Brown for left thyroid cancer    TUBAL ABDOMINAL LIGATION  2006      Family History   Problem Relation Age of Onset    Arthritis Mother     Heart attack Mother     Cancer Father         prostate    Heart attack Father     Hypertension Father     Stroke Father     Colon polyps Father     Diabetes Sister         type 1    Diabetes Brother         type 1    Gout Brother     Other Brother         glioblastoma    Stroke Paternal Grandmother     Breast cancer Cousin 55        50's    Ovarian cancer Neg Hx      Social History     Socioeconomic History    Marital status:    Tobacco Use    Smoking status: Former     Current packs/day: 0.00     Average packs/day: 0.8 packs/day for 12.0 years (9.0 ttl pk-yrs)     Types: Cigarettes     Start date:      Quit date:      Years since quittin.5    Smokeless tobacco: Never   Vaping Use    Vaping status: Never Used   Substance and Sexual Activity    Alcohol use: Yes     Comment: on occasion    Drug use: No    Sexual activity: Yes     Partners: Male      Current Outpatient Medications on File Prior to Visit   Medication Sig Dispense Refill    cetirizine (ZyrTEC) 10 MG tablet Take 1 tablet by mouth Daily.      Cholecalciferol (VITAMIN D-3 PO) Take 2,000 Units by mouth Daily.      levothyroxine (SYNTHROID, LEVOTHROID) 125 MCG tablet       losartan (COZAAR) 100 MG tablet Take 1 tablet by mouth Daily. 90 tablet 1    metoprolol succinate XL (TOPROL-XL) 50 MG 24 hr tablet Take 1 tablet by mouth Daily. 90 tablet 1     No current facility-administered medications on file prior to visit.      Allergies   Allergen Reactions    Lisinopril Cough        Review of Systems   Constitutional: Negative.    HENT: Negative.     Eyes: Negative.    Respiratory: Negative.     Cardiovascular: Negative.    Gastrointestinal: Negative.    Endocrine: Negative.    Genitourinary: Negative.   "  Musculoskeletal:  Positive for arthralgias.   Skin: Negative.    Allergic/Immunologic: Negative.    Neurological: Negative.    Hematological: Negative.    Psychiatric/Behavioral: Negative.          The patient's Review of Systems was personally reviewed and confirmed as accurate.    The following portions of the patient's history were reviewed and updated as appropriate: allergies, current medications, past family history, past medical history, past social history, past surgical history, and problem list.    Physical Exam  Blood pressure 132/78, height 168.7 cm (66.4\"), weight 103 kg (228 lb), last menstrual period 01/02/2018.    Body mass index is 36.36 kg/m².    GENERAL APPEARANCE: awake, alert & oriented x 3, in no acute distress and well developed, well nourished  PSYCH: normal affect  LUNGS:  breathing nonlabored  EYES: sclera anicteric  CARDIOVASCULAR: palpable dorsalis pedis, palpable posterior tibial bilaterally. Capillary refill less than 2 seconds  EXTREMITIES: no clubbing, cyanosis  GAIT:  Normal            Right Lower Extremity Exam:   ----------  Hip Exam  ----------  FLEXION CONTRACTURE: None  FLEXION: 110 degrees  INTERNAL ROTATION: 20 degrees at 90 degrees of flexion   EXTERNAL ROTATION: 40 degrees at 90 degrees of flexion    PAIN WITH HIP MOTION: no  ----------  Knee Exam  ----------  ALIGNMENT: neutral, no varus or valgus deformity     RANGE OF MOTION:  Normal (0-120 degrees) with no extensor lag or flexion contracture  LIGAMENTOUS STABILITY:   stable to varus and valgus stress at terminal extension and 30 degrees without any evidence of laxity     STRENGTH:  5/5 knee flexion, extension. 5/5 ankle dorsiflexion and plantarflexion.     PAIN WITH PALPATION: denies tenderness to palpation about the knee, denies medial or lateral joint line pain  KNEE EFFUSION: no  PAIN WITH KNEE ROM: no  PATELLAR CREPITUS: yes, asymptomatic  SPECIAL EXAM FINDINGS:  Negative patellar compression    REFLEXES:  PATELLAR " 2+/4  ACHILLES 2+/4    CLONUS: negative  STRAIGHT LEG TEST:   negative    SENSATION TO LIGHT TOUCH:  DEEP PERONEAL/SUPERFICIAL PERONEAL/SURAL/SAPHENOUS/TIBIAL:   intact    EDEMA:  no  ERYTHEMA:  no  WOUNDS/INCISIONS: no overlying skin problems.      Left Lower Extremity Exam:   ----------  Hip Exam  ----------  FLEXION CONTRACTURE: None  FLEXION: 110 degrees  INTERNAL ROTATION: 20 degrees at 90 degrees of flexion   EXTERNAL ROTATION: 40 degrees at 90 degrees of flexion    PAIN WITH HIP MOTION: no  ----------  Knee Exam  ----------  ALIGNMENT: moderate varus, correctible to neutral    RANGE OF MOTION:  Normal (0-120 degrees) with no extensor lag or flexion contracture  LIGAMENTOUS STABILITY:   stable to varus and valgus stress at terminal extension and 30 degrees; retensioning of the MCL is appreciated with valgus stress at 30 degrees consistent with medial compartment degeneration     STRENGTH:  5/5 knee flexion, extension. 5/5 ankle dorsiflexion and plantarflexion.     PAIN WITH PALPATION: medial joint line and anterior knee  KNEE EFFUSION: yes, trace effusion  PAIN WITH KNEE ROM: yes, anterior  PATELLAR CREPITUS: yes, painful and symptomatic  SPECIAL EXAM FINDINGS:  none    REFLEXES:  PATELLAR 2+/4  ACHILLES 2+/4    CLONUS: no  STRAIGHT LEG TEST:   negative    SENSATION TO LIGHT TOUCH:  DEEP PERONEAL/SUPERFICIAL PERONEAL/SURAL/SAPHENOUS/TIBIAL:   intact    EDEMA:  no  ERYTHEMA:  no  WOUNDS/INCISIONS:  no    ______________________________________________________________________  ______________________________________________________________________    RADIOGRAPHIC FINDINGS:   Indication: Left knee pain    Comparison: No prior xrays are available for comparison    Left knee(s) 4 views: moderate tricompartmental arthritis with genu varum alignment, periarticular osteophytes visualized in all compartments      Assessment/Plan:   Diagnosis Plan   1. Primary osteoarthritis of left knee  meloxicam (MOBIC) 15 MG tablet       2. Left knee pain, unspecified chronicity  XR Knee 4+ View Left        Patient suffering from arthritic flareup of the left knee.  Discussed treatment options with her regarding her ongoing knee pain.  She is agreeable to prescription anti-inflammatory meloxicam.  Meloxicam prescribed.  She will follow-up as needed.      Tony Pisano MD  07/11/24  08:19 EDT

## 2024-07-12 ENCOUNTER — PATIENT ROUNDING (BHMG ONLY) (OUTPATIENT)
Age: 59
End: 2024-07-12
Payer: COMMERCIAL

## 2024-07-12 NOTE — PROGRESS NOTES
July 12, 2024      Left message for the patient to welcome them to our practice and thank them for choosing us to take care of their orthopedic needs.  Also to see if they have any suggestions on how we can improve the patient experience and care.  I stated that they could also reach out if there are any questions or concerns they have about their visit. I left my name and phone number.      Barb CENTENO) ROT

## 2024-10-14 ENCOUNTER — TRANSCRIBE ORDERS (OUTPATIENT)
Dept: ADMINISTRATIVE | Facility: HOSPITAL | Age: 59
End: 2024-10-14
Payer: COMMERCIAL

## 2024-10-14 DIAGNOSIS — Z12.31 VISIT FOR SCREENING MAMMOGRAM: Primary | ICD-10-CM

## 2024-11-15 ENCOUNTER — HOSPITAL ENCOUNTER (OUTPATIENT)
Dept: MAMMOGRAPHY | Facility: HOSPITAL | Age: 59
Discharge: HOME | End: 2024-11-15
Admitting: FAMILY MEDICINE
Payer: COMMERCIAL

## 2024-11-15 DIAGNOSIS — Z12.31 VISIT FOR SCREENING MAMMOGRAM: ICD-10-CM

## 2024-11-15 PROCEDURE — 77063 BREAST TOMOSYNTHESIS BI: CPT

## 2024-11-15 PROCEDURE — 77067 SCR MAMMO BI INCL CAD: CPT

## 2024-11-22 ENCOUNTER — TELEPHONE (OUTPATIENT)
Dept: INTERNAL MEDICINE | Facility: CLINIC | Age: 59
End: 2024-11-22

## 2024-11-22 ENCOUNTER — OFFICE VISIT (OUTPATIENT)
Dept: INTERNAL MEDICINE | Facility: CLINIC | Age: 59
End: 2024-11-22
Payer: COMMERCIAL

## 2024-11-22 ENCOUNTER — LAB (OUTPATIENT)
Dept: INTERNAL MEDICINE | Facility: CLINIC | Age: 59
End: 2024-11-22
Payer: COMMERCIAL

## 2024-11-22 VITALS
TEMPERATURE: 97.5 F | BODY MASS INDEX: 36.8 KG/M2 | WEIGHT: 229 LBS | SYSTOLIC BLOOD PRESSURE: 118 MMHG | OXYGEN SATURATION: 98 % | DIASTOLIC BLOOD PRESSURE: 88 MMHG | HEIGHT: 66 IN | HEART RATE: 53 BPM

## 2024-11-22 DIAGNOSIS — I10 ESSENTIAL HYPERTENSION: ICD-10-CM

## 2024-11-22 DIAGNOSIS — E89.0 POSTOPERATIVE HYPOTHYROIDISM: ICD-10-CM

## 2024-11-22 DIAGNOSIS — Z23 NEED FOR COVID-19 VACCINE: ICD-10-CM

## 2024-11-22 DIAGNOSIS — Z23 NEEDS FLU SHOT: ICD-10-CM

## 2024-11-22 DIAGNOSIS — I10 ESSENTIAL HYPERTENSION: Primary | ICD-10-CM

## 2024-11-22 LAB
ALBUMIN SERPL-MCNC: 4.3 G/DL (ref 3.5–5.2)
ALBUMIN/GLOB SERPL: 2 G/DL
ALP SERPL-CCNC: 108 U/L (ref 39–117)
ALT SERPL W P-5'-P-CCNC: 18 U/L (ref 1–33)
ANION GAP SERPL CALCULATED.3IONS-SCNC: 12.9 MMOL/L (ref 5–15)
AST SERPL-CCNC: 18 U/L (ref 1–32)
BASOPHILS # BLD AUTO: 0.05 10*3/MM3 (ref 0–0.2)
BASOPHILS NFR BLD AUTO: 0.8 % (ref 0–1.5)
BILIRUB SERPL-MCNC: 0.5 MG/DL (ref 0–1.2)
BUN SERPL-MCNC: 12 MG/DL (ref 8–23)
BUN/CREAT SERPL: 16 (ref 7–25)
CALCIUM SPEC-SCNC: 9.5 MG/DL (ref 8.6–10.5)
CHLORIDE SERPL-SCNC: 99 MMOL/L (ref 98–107)
CHOLEST SERPL-MCNC: 217 MG/DL (ref 0–200)
CO2 SERPL-SCNC: 27.1 MMOL/L (ref 22–29)
CREAT SERPL-MCNC: 0.75 MG/DL (ref 0.57–1)
DEPRECATED RDW RBC AUTO: 39.9 FL (ref 37–54)
EGFRCR SERPLBLD CKD-EPI 2021: 91.3 ML/MIN/1.73
EOSINOPHIL # BLD AUTO: 0.2 10*3/MM3 (ref 0–0.4)
EOSINOPHIL NFR BLD AUTO: 3.2 % (ref 0.3–6.2)
ERYTHROCYTE [DISTWIDTH] IN BLOOD BY AUTOMATED COUNT: 12.9 % (ref 12.3–15.4)
GLOBULIN UR ELPH-MCNC: 2.2 GM/DL
GLUCOSE SERPL-MCNC: 98 MG/DL (ref 65–99)
HCT VFR BLD AUTO: 42 % (ref 34–46.6)
HDLC SERPL-MCNC: 60 MG/DL (ref 40–60)
HGB BLD-MCNC: 14.3 G/DL (ref 12–15.9)
IMM GRANULOCYTES # BLD AUTO: 0.02 10*3/MM3 (ref 0–0.05)
IMM GRANULOCYTES NFR BLD AUTO: 0.3 % (ref 0–0.5)
LDLC SERPL CALC-MCNC: 120 MG/DL (ref 0–100)
LDLC/HDLC SERPL: 1.91 {RATIO}
LYMPHOCYTES # BLD AUTO: 1.26 10*3/MM3 (ref 0.7–3.1)
LYMPHOCYTES NFR BLD AUTO: 20.4 % (ref 19.6–45.3)
MCH RBC QN AUTO: 29.1 PG (ref 26.6–33)
MCHC RBC AUTO-ENTMCNC: 34 G/DL (ref 31.5–35.7)
MCV RBC AUTO: 85.4 FL (ref 79–97)
MONOCYTES # BLD AUTO: 0.37 10*3/MM3 (ref 0.1–0.9)
MONOCYTES NFR BLD AUTO: 6 % (ref 5–12)
NEUTROPHILS NFR BLD AUTO: 4.27 10*3/MM3 (ref 1.7–7)
NEUTROPHILS NFR BLD AUTO: 69.3 % (ref 42.7–76)
NRBC BLD AUTO-RTO: 0 /100 WBC (ref 0–0.2)
PLATELET # BLD AUTO: 201 10*3/MM3 (ref 140–450)
PMV BLD AUTO: 10.6 FL (ref 6–12)
POTASSIUM SERPL-SCNC: 4.6 MMOL/L (ref 3.5–5.2)
PROT SERPL-MCNC: 6.5 G/DL (ref 6–8.5)
RBC # BLD AUTO: 4.92 10*6/MM3 (ref 3.77–5.28)
SODIUM SERPL-SCNC: 139 MMOL/L (ref 136–145)
T3FREE SERPL-MCNC: 2.81 PG/ML (ref 2–4.4)
T4 FREE SERPL-MCNC: 1.66 NG/DL (ref 0.92–1.68)
TRIGL SERPL-MCNC: 212 MG/DL (ref 0–150)
TSH SERPL DL<=0.05 MIU/L-ACNC: 0.05 UIU/ML (ref 0.27–4.2)
VLDLC SERPL-MCNC: 37 MG/DL (ref 5–40)
WBC NRBC COR # BLD AUTO: 6.17 10*3/MM3 (ref 3.4–10.8)

## 2024-11-22 PROCEDURE — 80061 LIPID PANEL: CPT | Performed by: FAMILY MEDICINE

## 2024-11-22 PROCEDURE — 84481 FREE ASSAY (FT-3): CPT | Performed by: FAMILY MEDICINE

## 2024-11-22 PROCEDURE — 84439 ASSAY OF FREE THYROXINE: CPT | Performed by: FAMILY MEDICINE

## 2024-11-22 PROCEDURE — 80050 GENERAL HEALTH PANEL: CPT | Performed by: FAMILY MEDICINE

## 2024-11-22 RX ORDER — LOSARTAN POTASSIUM 100 MG/1
100 TABLET ORAL DAILY
Qty: 90 TABLET | Refills: 1 | Status: SHIPPED | OUTPATIENT
Start: 2024-11-22

## 2024-11-22 RX ORDER — METOPROLOL SUCCINATE 50 MG/1
50 TABLET, EXTENDED RELEASE ORAL DAILY
Qty: 90 TABLET | Refills: 1 | Status: SHIPPED | OUTPATIENT
Start: 2024-11-22

## 2024-11-22 NOTE — TELEPHONE ENCOUNTER
----- Message from Fallon Cope sent at 11/22/2024  5:52 PM EST -----  Send copy of lab to Specialist Dr Reyes, Endocrine

## 2024-11-22 NOTE — PROGRESS NOTES
"Subjective   Tiarra Becerra is a 59 y.o. female.     Chief Complaint   Patient presents with    Hypertension    Hyperlipidemia       Visit Vitals  /88 (BP Location: Left arm, Patient Position: Sitting, Cuff Size: Adult)   Pulse 53   Temp 97.5 °F (36.4 °C)   Ht 168.7 cm (66.4\")   Wt 104 kg (229 lb)   LMP 01/02/2018   SpO2 98%   BMI 36.52 kg/m²       Wt Readings from Last 3 Encounters:   11/22/24 104 kg (229 lb)   07/11/24 103 kg (228 lb)   05/20/24 109 kg (240 lb 12.8 oz)         Hypertension  This is a chronic problem. The current episode started more than 1 year ago. The problem has been stable since onset. The problem is controlled. Pertinent negatives include no anxiety, blurred vision, chest pain, headaches, malaise/fatigue, neck pain, orthopnea, palpitations, peripheral edema, PND, shortness of breath or sweats. Agents associated with hypertension include thyroid hormones. Risk factors for coronary artery disease include dyslipidemia, family history, obesity and post-menopausal state. Current antihypertension treatment includes beta blockers and angiotensin blockers. The current treatment provides significant improvement. There are no compliance problems.  There is no history of angina, kidney disease, CAD/MI, CVA, heart failure, left ventricular hypertrophy, PVD or retinopathy. Identifiable causes of hypertension include sleep apnea (mild sleep study done in February) and a thyroid problem. There is no history of chronic renal disease.   Hyperlipidemia  This is a chronic problem. The current episode started more than 1 year ago. The problem is uncontrolled. Lipid results: pending. Exacerbating diseases include hypothyroidism and obesity. She has no history of chronic renal disease, diabetes, liver disease or nephrotic syndrome. Factors aggravating her hyperlipidemia include beta blockers. Pertinent negatives include no chest pain, focal sensory loss, focal weakness, leg pain, myalgias or shortness of " breath. Current antihyperlipidemic treatment includes statins. The current treatment provides significant improvement of lipids. There are no compliance problems.  Risk factors for coronary artery disease include dyslipidemia, hypertension, obesity, post-menopausal and family history.      Pt is seeing Dr Reyes in Endocrine for her hypothyroidism s/p thyroid resection for thyroid cancer. .  Pt still having day time fatigue. Pt has mild TEN per study and not currently on CPAP.     The following portions of the patient's history were reviewed and updated as appropriate: allergies, current medications, past family history, past medical history, past social history, past surgical history, and problem list.    Past Medical History:   Diagnosis Date    Diverticulitis of colon     Endometrial cancer 11/28/2018    Essential hypertension 08/06/2018    Hx of radiation therapy     Mixed hyperlipidemia 09/08/2022    Postoperative hypothyroidism 01/23/2023    Thyroid cancer 01/23/2023    papillary      Past Surgical History:   Procedure Laterality Date    COLONOSCOPY  11/25/2019    Dr Giraldo, tics, internal hemorrhoids    COLONOSCOPY  06/27/2022    Dr Giraldo 5 yr f/u    D & C HYSTEROSCOPY MYOSURE  11/05/2018    OOPHORECTOMY      TOTAL LAPAROSCOPIC HYSTERECTOMY WITH DAVINCI ROBOT  11/30/2018    laproscopic with BSO, lymph node dissection, left parametrectomy, lysis ureters, Dr Jaffe. Cancer    TOTAL LAPAROSCOPIC HYSTERECTOMY, LAPAROSCOPIC NODE DISSECTION N/A 11/30/2018    Procedure: TOTAL LAPAROSCOPIC HYSTERECTOMY WITH DAVINCI ROBOT, BILATERAL SALPINGO OOPHORECTOMY, WITH RADICAL LEFT PARAMETRECTOMY, BILATERAL UTERETAL LYSIS, PELVIC LYMPH NODE DISSECTION;  Surgeon: April Jaffe MD;  Location: [facility];  Service: DaVinci    TOTAL THYROIDECTOMY  12/13/2022    Dr LILY Brown for left thyroid cancer    TUBAL ABDOMINAL LIGATION  2006      Family History   Problem Relation Age of Onset    Arthritis Mother     Heart attack Mother      Cancer Father         prostate    Heart attack Father     Hypertension Father     Stroke Father     Colon polyps Father     Diabetes Sister         type 1    Diabetes Brother         type 1    Gout Brother     Other Brother         glioblastoma    Stroke Paternal Grandmother     Breast cancer Cousin 55        50's    Ovarian cancer Neg Hx       Social History     Socioeconomic History    Marital status:    Tobacco Use    Smoking status: Former     Current packs/day: 0.00     Average packs/day: 0.8 packs/day for 12.0 years (9.0 ttl pk-yrs)     Types: Cigarettes     Start date:      Quit date:      Years since quittin.9    Smokeless tobacco: Never   Vaping Use    Vaping status: Never Used   Substance and Sexual Activity    Alcohol use: Yes     Comment: on occasion    Drug use: No    Sexual activity: Yes     Partners: Male      Allergies   Allergen Reactions    Lisinopril Cough       Review of Systems   Constitutional:  Negative for malaise/fatigue.   Eyes:  Negative for blurred vision.   Respiratory:  Negative for shortness of breath.    Cardiovascular:  Negative for chest pain, palpitations, orthopnea and PND.   Musculoskeletal:  Negative for myalgias and neck pain.   Neurological:  Negative for focal weakness and headaches.       Objective   Physical Exam  Vitals and nursing note reviewed.   Constitutional:       Appearance: She is well-developed.   HENT:      Head: Normocephalic.      Right Ear: External ear normal.      Left Ear: External ear normal.      Nose: Nose normal.   Eyes:      General: Lids are normal.      Conjunctiva/sclera: Conjunctivae normal.      Pupils: Pupils are equal, round, and reactive to light.   Neck:      Thyroid: No thyroid mass or thyromegaly.      Vascular: No carotid bruit.      Trachea: Trachea normal.   Cardiovascular:      Rate and Rhythm: Normal rate and regular rhythm.      Heart sounds: No murmur heard.  Pulmonary:      Effort: Pulmonary effort is normal.  No respiratory distress.      Breath sounds: Normal breath sounds. No decreased breath sounds, wheezing, rhonchi or rales.   Chest:      Chest wall: No tenderness.   Abdominal:      General: Bowel sounds are normal.      Palpations: Abdomen is soft.      Tenderness: There is no abdominal tenderness.   Musculoskeletal:         General: Normal range of motion.      Cervical back: Normal range of motion and neck supple.   Skin:     General: Skin is warm and dry.   Neurological:      Mental Status: She is alert and oriented to person, place, and time.   Psychiatric:         Behavior: Behavior normal.         Assessment & Plan   Problems Addressed this Visit          Cardiac and Vasculature    Essential hypertension - Primary    Relevant Medications    metoprolol succinate XL (TOPROL-XL) 50 MG 24 hr tablet    losartan (COZAAR) 100 MG tablet    Other Relevant Orders    TSH    Comprehensive Metabolic Panel    Lipid Panel    CBC & Differential       Endocrine and Metabolic    Postoperative hypothyroidism    Relevant Medications    metoprolol succinate XL (TOPROL-XL) 50 MG 24 hr tablet    Other Relevant Orders    TSH    T4, Free    T3, Free     Other Visit Diagnoses       Need for COVID-19 vaccine        Relevant Orders    COVID-19 (Pfizer) 12yrs+ (COMIRNATY)    Needs flu shot        Relevant Orders    Fluzone >6mos ([id number])          Diagnoses         Codes Comments    Essential hypertension    -  Primary ICD-10-CM: I10  ICD-9-CM: 401.9     Need for COVID-19 vaccine     ICD-10-CM: Z23  ICD-9-CM: V04.89     Needs flu shot     ICD-10-CM: Z23  ICD-9-CM: V04.81     Postoperative hypothyroidism     ICD-10-CM: E89.0  ICD-9-CM: 244.0           Pt notified that LIANNA Jang notified that she does not have CPAP.  Sleep lab has tried to contact her since the study.  Arrangements have been made for CPAP and pt will be contacted by [facility] [employer] [facility]. Pt to call if she has not heard from [employer] within 2 weeks.              Current Outpatient  Medications:     cetirizine (ZyrTEC) 10 MG tablet, Take 1 tablet by mouth Daily., Disp: , Rfl:     Cholecalciferol (VITAMIN D-3 PO), Take 2,000 Units by mouth Daily., Disp: , Rfl:     levothyroxine (SYNTHROID, LEVOTHROID) 125 MCG tablet, , Disp: , Rfl:     losartan (COZAAR) 100 MG tablet, Take 1 tablet by mouth Daily., Disp: 90 tablet, Rfl: 1    metoprolol succinate XL (TOPROL-XL) 50 MG 24 hr tablet, Take 1 tablet by mouth Daily., Disp: 90 tablet, Rfl: 1    Return in about 6 months (around 5/22/2025), or if symptoms worsen or fail to improve, for Recheck, Annual physical.    Recent Results (from the past week)   TSH    Collection Time: 11/22/24  8:38 AM    Specimen: Arm, Right; Blood   Result Value Ref Range    TSH 0.048 (L) 0.270 - 4.200 uIU/mL   T4, Free    Collection Time: 11/22/24  8:38 AM    Specimen: Arm, Right; Blood   Result Value Ref Range    Free T4 1.66 0.92 - 1.68 ng/dL   T3, Free    Collection Time: 11/22/24  8:38 AM    Specimen: Arm, Right; Blood   Result Value Ref Range    T3, Free 2.81 2.00 - 4.40 pg/mL   Comprehensive Metabolic Panel    Collection Time: 11/22/24  8:38 AM    Specimen: Arm, Right; Blood   Result Value Ref Range    Glucose 98 65 - 99 mg/dL    BUN 12 8 - 23 mg/dL    Creatinine 0.75 0.57 - 1.00 mg/dL    Sodium 139 136 - 145 mmol/L    Potassium 4.6 3.5 - 5.2 mmol/L    Chloride 99 98 - 107 mmol/L    CO2 27.1 22.0 - 29.0 mmol/L    Calcium 9.5 8.6 - 10.5 mg/dL    Total Protein 6.5 6.0 - 8.5 g/dL    Albumin 4.3 3.5 - 5.2 g/dL    ALT (SGPT) 18 1 - 33 U/L    AST (SGOT) 18 1 - 32 U/L    Alkaline Phosphatase 108 39 - 117 U/L    Total Bilirubin 0.5 0.0 - 1.2 mg/dL    Globulin 2.2 gm/dL    A/G Ratio 2.0 g/dL    BUN/Creatinine Ratio 16.0 7.0 - 25.0    Anion Gap 12.9 5.0 - 15.0 mmol/L    eGFR 91.3 >60.0 mL/min/1.73   Lipid Panel    Collection Time: 11/22/24  8:38 AM    Specimen: Arm, Right; Blood   Result Value Ref Range    Total Cholesterol 217 (H) 0 - 200 mg/dL    Triglycerides 212 (H) 0 - 150  mg/dL    HDL Cholesterol 60 40 - 60 mg/dL    LDL Cholesterol  120 (H) 0 - 100 mg/dL    VLDL Cholesterol 37 5 - 40 mg/dL    LDL/HDL Ratio 1.91    CBC Auto Differential    Collection Time: 11/22/24  8:38 AM    Specimen: Arm, Right; Blood   Result Value Ref Range    WBC 6.17 3.40 - 10.80 10*3/mm3    RBC 4.92 3.77 - 5.28 10*6/mm3    Hemoglobin 14.3 12.0 - 15.9 g/dL    Hematocrit 42.0 34.0 - 46.6 %    MCV 85.4 79.0 - 97.0 fL    MCH 29.1 26.6 - 33.0 pg    MCHC 34.0 31.5 - 35.7 g/dL    RDW 12.9 12.3 - 15.4 %    RDW-SD 39.9 37.0 - 54.0 fl    MPV 10.6 6.0 - 12.0 fL    Platelets 201 140 - 450 10*3/mm3    Neutrophil % 69.3 42.7 - 76.0 %    Lymphocyte % 20.4 19.6 - 45.3 %    Monocyte % 6.0 5.0 - 12.0 %    Eosinophil % 3.2 0.3 - 6.2 %    Basophil % 0.8 0.0 - 1.5 %    Immature Grans % 0.3 0.0 - 0.5 %    Neutrophils, Absolute 4.27 1.70 - 7.00 10*3/mm3    Lymphocytes, Absolute 1.26 0.70 - 3.10 10*3/mm3    Monocytes, Absolute 0.37 0.10 - 0.90 10*3/mm3    Eosinophils, Absolute 0.20 0.00 - 0.40 10*3/mm3    Basophils, Absolute 0.05 0.00 - 0.20 10*3/mm3    Immature Grans, Absolute 0.02 0.00 - 0.05 10*3/mm3    nRBC 0.0 0.0 - 0.2 /100 WBC

## 2024-11-24 DIAGNOSIS — I10 ESSENTIAL HYPERTENSION: ICD-10-CM

## 2024-11-25 RX ORDER — METOPROLOL SUCCINATE 50 MG/1
50 TABLET, EXTENDED RELEASE ORAL DAILY
Qty: 90 TABLET | Refills: 1 | OUTPATIENT
Start: 2024-11-25

## 2024-11-25 RX ORDER — LOSARTAN POTASSIUM 100 MG/1
100 TABLET ORAL DAILY
Qty: 90 TABLET | Refills: 1 | OUTPATIENT
Start: 2024-11-25

## 2025-03-06 ENCOUNTER — OFFICE VISIT (OUTPATIENT)
Dept: SLEEP MEDICINE | Facility: CLINIC | Age: 60
End: 2025-03-06
Payer: COMMERCIAL

## 2025-03-06 VITALS
HEIGHT: 66 IN | TEMPERATURE: 97.3 F | BODY MASS INDEX: 36.96 KG/M2 | DIASTOLIC BLOOD PRESSURE: 82 MMHG | HEART RATE: 60 BPM | SYSTOLIC BLOOD PRESSURE: 124 MMHG | OXYGEN SATURATION: 97 % | WEIGHT: 230 LBS

## 2025-03-06 DIAGNOSIS — G47.33 OSA (OBSTRUCTIVE SLEEP APNEA): Primary | ICD-10-CM

## 2025-03-06 PROCEDURE — 99213 OFFICE O/P EST LOW 20 MIN: CPT | Performed by: NURSE PRACTITIONER

## 2025-03-06 NOTE — PROGRESS NOTES
Chief Complaint:   Chief Complaint   Patient presents with    Follow-up    Sleep Apnea       HPI:    Tiarra Becerra is a 60 y.o. female here for follow-up of sleep apnea patient was last seen 2/6/2024.    Patient stated for greater than 5 years she does have snoring, excessive daytime sleepiness, morning headaches 2-3 times a month heartburn, nasal allergies, teeth grinding, frequent nighttime urination, pain at night, and abnormal heart rhythm.  Patient stated her palpitations have been better since removing her thyroid.  She does deny nasal fracture, head injury, hypnagogic hallucinations and sleep paralysis.  Patient had a sleep study 2/16/2024 that did show mild obstructive sleep apnea and patient initiated CPAP therapy.  Patient feels she does do well with CPAP but does not feel any different than before using.  She is sleeping 6 hours nightly.  She will go to sleep within 15 minutes and does get up 2-3 times in the night.  She has an [place] score of 11/24.  We did discuss the health benefits she is receiving from being compliant on CPAP and if she still stays excessively sleepy she will definitely make an appointment we will discuss further stimulant therapy.  Her [place] now is currently 11/24.  She does understand at 12/24 we can offer low-dose stimulant therapy.    Current medications are:   Current Outpatient Medications:     cetirizine (ZyrTEC) 10 MG tablet, Take 1 tablet by mouth Daily., Disp: , Rfl:     Cholecalciferol (VITAMIN D-3 PO), Take 2,000 Units by mouth Daily., Disp: , Rfl:     levothyroxine (SYNTHROID, LEVOTHROID) 125 MCG tablet, , Disp: , Rfl:     losartan (COZAAR) 100 MG tablet, Take 1 tablet by mouth Daily., Disp: 90 tablet, Rfl: 1    metoprolol succinate XL (TOPROL-XL) 50 MG 24 hr tablet, Take 1 tablet by mouth Daily., Disp: 90 tablet, Rfl: 1.      The patient's relevant past medical, surgical, family and social history were reviewed and updated in Epic as appropriate.       Review of  "Systems   HENT:  Positive for postnasal drip, rhinorrhea, sneezing and tinnitus.    Eyes:  Positive for itching. Negative for visual disturbance.   Respiratory:  Positive for apnea.    Cardiovascular:  Positive for palpitations.   Genitourinary:  Positive for frequency and urgency.   Allergic/Immunologic: Positive for environmental allergies and food allergies.   Neurological:  Positive for dizziness and light-headedness.   Psychiatric/Behavioral:  Positive for sleep disturbance.    All other systems reviewed and are negative.        Objective:    Physical Exam  Constitutional:       Appearance: Normal appearance.   HENT:      Head: Normocephalic and atraumatic.      Mouth/Throat:      Comments: Mallampati 1 anatomy  Cardiovascular:      Rate and Rhythm: Regular rhythm. Bradycardia present.   Pulmonary:      Effort: Pulmonary effort is normal.      Breath sounds: Normal breath sounds.   Skin:     General: Skin is warm and dry.   Neurological:      Mental Status: She is alert and oriented to person, place, and time.   Psychiatric:         Mood and Affect: Mood normal.         Behavior: Behavior normal.         Thought Content: Thought content normal.         Judgment: Judgment normal.       /82   Pulse 60   Temp 97.3 °F (36.3 °C)   Ht 168.7 cm (66.42\")   Wt 104 kg (230 lb)   LMP 01/02/2018   SpO2 97%   BMI 36.66 kg/m²     CPAP Report  63/75 days of use  Greater than 4-hour use 67%  Setting 8-18  95th percentile pressure 11.6  AHI 0.8    The patient continues to use and benefit from CPAP therapy.    ASSESSMENT/PLAN    Diagnoses and all orders for this visit:    1. TEN (obstructive sleep apnea) (Primary)  -     PAP Therapy        Refill supplies x 1 year.  Return to clinic 1 year or sooner as symptoms warrant.  Signed by  LIANNA Rosa    March 6, 2025      CC: Fallon Cope MD         No ref. provider found      "

## 2025-05-23 ENCOUNTER — OFFICE VISIT (OUTPATIENT)
Dept: INTERNAL MEDICINE | Facility: CLINIC | Age: 60
End: 2025-05-23
Payer: COMMERCIAL

## 2025-05-23 VITALS
SYSTOLIC BLOOD PRESSURE: 116 MMHG | WEIGHT: 229.2 LBS | HEIGHT: 66 IN | DIASTOLIC BLOOD PRESSURE: 74 MMHG | OXYGEN SATURATION: 98 % | HEART RATE: 55 BPM | BODY MASS INDEX: 36.83 KG/M2 | TEMPERATURE: 97.3 F

## 2025-05-23 DIAGNOSIS — Z00.00 ANNUAL PHYSICAL EXAM: Primary | ICD-10-CM

## 2025-05-23 DIAGNOSIS — I10 ESSENTIAL HYPERTENSION: ICD-10-CM

## 2025-05-23 DIAGNOSIS — Z01.84 IMMUNITY STATUS TESTING: ICD-10-CM

## 2025-05-23 LAB
BILIRUB BLD-MCNC: NEGATIVE MG/DL
CLARITY, POC: CLEAR
COLOR UR: YELLOW
EXPIRATION DATE: NORMAL
GLUCOSE UR STRIP-MCNC: NEGATIVE MG/DL
KETONES UR QL: NEGATIVE
LEUKOCYTE EST, POC: NEGATIVE
Lab: NORMAL
NITRITE UR-MCNC: NEGATIVE MG/ML
PH UR: 7 [PH] (ref 5–8)
PROT UR STRIP-MCNC: NEGATIVE MG/DL
RBC # UR STRIP: NEGATIVE /UL
SP GR UR: 1.01 (ref 1–1.03)
UROBILINOGEN UR QL: NORMAL

## 2025-05-23 RX ORDER — METOPROLOL SUCCINATE 50 MG/1
50 TABLET, EXTENDED RELEASE ORAL DAILY
Qty: 90 TABLET | Refills: 1 | Status: SHIPPED | OUTPATIENT
Start: 2025-05-23

## 2025-05-23 RX ORDER — LOSARTAN POTASSIUM 100 MG/1
100 TABLET ORAL DAILY
Qty: 90 TABLET | Refills: 1 | Status: SHIPPED | OUTPATIENT
Start: 2025-05-23

## 2025-05-23 NOTE — PROGRESS NOTES
"Subjective   Tiarra Becerra is a 60 y.o. female.         Chief Complaint   Patient presents with    Annual Exam       Visit Vitals  /74   Pulse 55   Temp 97.3 °F (36.3 °C) (Infrared)   Ht 168.7 cm (66.42\")   Wt 104 kg (229 lb 3.2 oz)   LMP 01/02/2018   SpO2 98%   BMI 36.53 kg/m²         Hypertension  Chronicity:  Chronic  Onset:  More than 1 year ago  Progression since onset:  Unchanged  Condition status:  Controlled  Associated symptoms: no anxiety, no blurred vision, no chest pain, no headaches, no malaise/fatigue, no neck pain, no orthopnea, no palpitations, no peripheral edema, no PND, no shortness of breath, no sweats, no dyspnea with exertion and no dizziness    Agents associated with hypertension:  Thyroid hormones  CAD risks:  Post-menopausal state, obesity and family history  Current therapy:  Angiotensin blockers and beta blockers  Improvement on treatment:  Significant  Compliance problems:  No compliance problems  End-organ damage: no angina, no kidney disease, no CAD/MI, no CVA, no heart failure, no left ventricular hypertrophy, no PVD and no retinopathy    Identifiable causes: thyroid problem    Identifiable causes: no sleep apnea       Pt here or annual exam.   Pt has been treated for thyroid cancer and is seeing Dr Reyes in Endo. Pt is seeing GYN. Pt is up to date on mammogram and colonoscopy.     The following portions of the patient's history were reviewed and updated as appropriate: allergies, current medications, past family history, past medical history, past social history, past surgical history, and problem list.    Past Medical History:   Diagnosis Date    Diverticulitis of colon     Endometrial cancer 11/28/2018    Essential hypertension 08/06/2018    Hx of radiation therapy     Mixed hyperlipidemia 09/08/2022    Postoperative hypothyroidism 01/23/2023    Thyroid cancer 01/23/2023    papillary      Past Surgical History:   Procedure Laterality Date    COLONOSCOPY  11/25/2019    Dr" Enzo, tics, internal hemorrhoids    COLONOSCOPY  2022    Dr Giraldo 5 yr f/u    D & C HYSTEROSCOPY MYOSURE  2018    OOPHORECTOMY      TOTAL LAPAROSCOPIC HYSTERECTOMY WITH DAVINCI ROBOT  2018    laproscopic with BSO, lymph node dissection, left parametrectomy, lysis ureters, Dr Jaffe. Cancer    TOTAL LAPAROSCOPIC HYSTERECTOMY, LAPAROSCOPIC NODE DISSECTION N/A 2018    Procedure: TOTAL LAPAROSCOPIC HYSTERECTOMY WITH DAVINCI ROBOT, BILATERAL SALPINGO OOPHORECTOMY, WITH RADICAL LEFT PARAMETRECTOMY, BILATERAL UTERETAL LYSIS, PELVIC LYMPH NODE DISSECTION;  Surgeon: April Jaffe MD;  Location: [facility];  Service: DaVinci    TOTAL THYROIDECTOMY  2022    Dr LILY Brown for left thyroid cancer    TUBAL ABDOMINAL LIGATION        Family History   Problem Relation Age of Onset    Arthritis Mother     Heart attack Mother     Cancer Father         prostate    Heart attack Father     Hypertension Father     Stroke Father     Colon polyps Father     Diabetes Sister         type 1    Diabetes Brother         type 1    Gout Brother     Other Brother         glioblastoma    Stroke Paternal Grandmother     Breast cancer Cousin 55        50's    Ovarian cancer Neg Hx       Social History     Socioeconomic History    Marital status:    Tobacco Use    Smoking status: Former     Current packs/day: 0.00     Average packs/day: 0.8 packs/day for 12.0 years (9.0 ttl pk-yrs)     Types: Cigarettes     Start date:      Quit date:      Years since quittin.4    Smokeless tobacco: Never   Vaping Use    Vaping status: Never Used   Substance and Sexual Activity    Alcohol use: Yes     Comment: on occasion    Drug use: No    Sexual activity: Yes     Partners: Male      Allergies   Allergen Reactions    Lisinopril Cough       Review of Systems   Constitutional:  Negative for activity change, appetite change, chills, diaphoresis, fatigue, fever, malaise/fatigue and unexpected weight change.    HENT:  Positive for tinnitus. Negative for congestion, dental problem, drooling, ear discharge, ear pain, facial swelling, hearing loss, mouth sores, nosebleeds, postnasal drip, rhinorrhea, sinus pressure, sinus pain, sneezing, sore throat, trouble swallowing and voice change.    Eyes:  Negative for blurred vision, photophobia, pain, discharge, redness, itching and visual disturbance.   Respiratory:  Negative for apnea, cough, choking, chest tightness, shortness of breath, wheezing and stridor.    Cardiovascular:  Negative for chest pain, palpitations, orthopnea, leg swelling and PND.   Gastrointestinal:  Negative for abdominal distention, abdominal pain, anal bleeding, blood in stool, constipation, diarrhea, nausea, rectal pain and vomiting.   Endocrine: Negative for cold intolerance, heat intolerance, polydipsia, polyphagia and polyuria.   Genitourinary:  Positive for frequency and urgency. Negative for decreased urine volume, difficulty urinating, dyspareunia, dysuria, enuresis, flank pain, genital sores, hematuria, menstrual problem, pelvic pain, vaginal bleeding, vaginal discharge and vaginal pain.   Musculoskeletal:  Positive for arthralgias. Negative for back pain, gait problem, joint swelling, myalgias, neck pain and neck stiffness.   Skin:  Negative for color change, pallor, rash and wound.   Allergic/Immunologic: Negative for environmental allergies, food allergies and immunocompromised state.   Neurological:  Negative for dizziness, tremors, seizures, syncope, facial asymmetry, speech difficulty, weakness, light-headedness, numbness and headaches.   Hematological:  Negative for adenopathy. Does not bruise/bleed easily.   Psychiatric/Behavioral:  Negative for agitation, behavioral problems, confusion, decreased concentration, dysphoric mood, hallucinations, self-injury, sleep disturbance and suicidal ideas. The patient is not nervous/anxious and is not hyperactive.      PHQ-2 Depression Screening  Little  interest or pleasure in doing things? Not at all   Feeling down, depressed, or hopeless? Not at all   PHQ-2 Total Score 0       Objective   Physical Exam  Vitals and nursing note reviewed.   Constitutional:       General: She is not in acute distress.     Appearance: She is well-developed. She is not diaphoretic.   HENT:      Head: Normocephalic and atraumatic.      Right Ear: Tympanic membrane, ear canal and external ear normal.      Left Ear: Tympanic membrane, ear canal and external ear normal.      Nose: Nose normal.      Mouth/Throat:      Lips: Pink. No lesions.      Mouth: Mucous membranes are moist. Mucous membranes are not pale, not dry and not cyanotic. No injury.      Dentition: Normal dentition.      Tongue: No lesions.      Palate: No mass.      Pharynx: Uvula midline. No pharyngeal swelling, oropharyngeal exudate, posterior oropharyngeal erythema or uvula swelling.   Eyes:      General: Lids are normal. No scleral icterus.        Right eye: No foreign body, discharge or hordeolum.         Left eye: No foreign body, discharge or hordeolum.      Extraocular Movements:      Right eye: Normal extraocular motion and no nystagmus.      Left eye: Normal extraocular motion and no nystagmus.      Conjunctiva/sclera: Conjunctivae normal.      Right eye: Right conjunctiva is not injected. No chemosis, exudate or hemorrhage.     Left eye: Left conjunctiva is not injected. No chemosis, exudate or hemorrhage.     Pupils: Pupils are equal, round, and reactive to light.   Neck:      Thyroid: No thyroid mass or thyromegaly.      Vascular: No carotid bruit.      Trachea: Trachea normal. No tracheal deviation.   Cardiovascular:      Rate and Rhythm: Normal rate and regular rhythm.      Pulses:           Dorsalis pedis pulses are 2+ on the right side and 2+ on the left side.        Posterior tibial pulses are 2+ on the right side and 2+ on the left side.      Heart sounds: Normal heart sounds. No murmur heard.     No  friction rub. No gallop.   Pulmonary:      Effort: Pulmonary effort is normal. No respiratory distress.      Breath sounds: Normal breath sounds. No decreased breath sounds, wheezing, rhonchi or rales.   Chest:      Chest wall: No deformity or tenderness. There is no dullness to percussion.   Breasts:     Pritesh Score is 5.      Breasts are symmetrical.      Right: Normal. No swelling, bleeding, inverted nipple, mass, nipple discharge, skin change or tenderness.      Left: Normal. No swelling, bleeding, inverted nipple, mass, nipple discharge, skin change or tenderness.   Abdominal:      General: Bowel sounds are normal. There is no distension.      Palpations: Abdomen is soft. There is no hepatomegaly, splenomegaly, mass or pulsatile mass.      Tenderness: There is no abdominal tenderness. There is no guarding or rebound.      Hernia: No hernia is present.   Musculoskeletal:         General: No tenderness or deformity. Normal range of motion.      Cervical back: Normal range of motion and neck supple.      Right lower leg: No edema.      Left lower leg: No edema.   Lymphadenopathy:      Head:      Right side of head: No submandibular adenopathy.      Left side of head: No submandibular adenopathy.      Cervical: No cervical adenopathy.      Right cervical: No superficial, deep or posterior cervical adenopathy.     Left cervical: No superficial, deep or posterior cervical adenopathy.      Upper Body:      Right upper body: No supraclavicular, axillary or pectoral adenopathy.      Left upper body: No supraclavicular, axillary or pectoral adenopathy.   Skin:     General: Skin is warm and dry.      Findings: No rash.      Nails: There is no clubbing.   Neurological:      Mental Status: She is alert and oriented to person, place, and time.      Cranial Nerves: No cranial nerve deficit.      Sensory: No sensory deficit.      Coordination: Coordination normal.      Deep Tendon Reflexes: Reflexes are normal and symmetric.       Reflex Scores:       Bicep reflexes are 2+ on the right side and 2+ on the left side.       Brachioradialis reflexes are 2+ on the right side and 2+ on the left side.       Patellar reflexes are 2+ on the right side and 2+ on the left side.  Psychiatric:         Attention and Perception: Attention and perception normal.         Mood and Affect: Mood and affect normal.         Speech: Speech normal.         Behavior: Behavior normal.         Thought Content: Thought content normal.         Cognition and Memory: Cognition and memory normal.         Judgment: Judgment normal.         Assessment & Plan   Problems Addressed this Visit          Cardiac and Vasculature    Essential hypertension    Relevant Medications    metoprolol succinate XL (TOPROL-XL) 50 MG 24 hr tablet    losartan (COZAAR) 100 MG tablet     Other Visit Diagnoses         Annual physical exam    -  Primary    Relevant Orders    TSH Rfx On Abnormal To Free T4    Comprehensive Metabolic Panel    Lipid Panel    CBC & Differential    POC Urinalysis Dipstick, Automated (Completed)      Immunity status testing        Relevant Orders    Measles / Mumps / Rubella Immunity          Diagnoses         Codes Comments      Annual physical exam    -  Primary ICD-10-CM: Z00.00  ICD-9-CM: V70.0       Essential hypertension     ICD-10-CM: I10  ICD-9-CM: 401.9       Immunity status testing     ICD-10-CM: Z01.84  ICD-9-CM: V72.61             Please follow a low animal fat diet that is also low in sugar, low in junk food, low in sweet drinks and low in alcohol.  Please increase the amount of fiber in your diet as well as increasing your daily exercise, such as walking.             Current Outpatient Medications:     cetirizine (ZyrTEC) 10 MG tablet, Take 1 tablet by mouth Daily., Disp: , Rfl:     Cholecalciferol (VITAMIN D-3 PO), Take 2,000 Units by mouth Daily., Disp: , Rfl:     levothyroxine (SYNTHROID, LEVOTHROID) 125 MCG tablet, , Disp: , Rfl:     losartan  (COZAAR) 100 MG tablet, Take 1 tablet by mouth Daily., Disp: 90 tablet, Rfl: 1    metoprolol succinate XL (TOPROL-XL) 50 MG 24 hr tablet, Take 1 tablet by mouth Daily., Disp: 90 tablet, Rfl: 1    Return in about 6 months (around 11/23/2025), or if symptoms worsen or fail to improve, for Recheck bp.    Recent Results (from the past week)   POC Urinalysis Dipstick, Automated    Collection Time: 05/23/25 12:01 PM    Specimen: Urine   Result Value Ref Range    Color Yellow Yellow, Straw, Dark Yellow, Sushila    Clarity, UA Clear Clear    Specific Gravity  1.010 1.005 - 1.030    pH, Urine 7.0 5.0 - 8.0    Leukocytes Negative Negative    Nitrite, UA Negative Negative    Protein, POC Negative Negative mg/dL    Glucose, UA Negative Negative mg/dL    Ketones, UA Negative Negative    Urobilinogen, UA 0.2 E.U./dL Normal, 0.2 E.U./dL    Bilirubin Negative Negative    Blood, UA Negative Negative    Lot Number [id number]     Expiration Date 2026/10/05

## 2025-05-24 DIAGNOSIS — I10 ESSENTIAL HYPERTENSION: ICD-10-CM

## 2025-05-27 RX ORDER — METOPROLOL SUCCINATE 50 MG/1
50 TABLET, EXTENDED RELEASE ORAL DAILY
Qty: 90 TABLET | Refills: 1 | OUTPATIENT
Start: 2025-05-27

## 2025-05-27 RX ORDER — LOSARTAN POTASSIUM 100 MG/1
100 TABLET ORAL DAILY
Qty: 90 TABLET | Refills: 1 | OUTPATIENT
Start: 2025-05-27

## 2025-06-06 ENCOUNTER — LAB (OUTPATIENT)
Dept: INTERNAL MEDICINE | Facility: CLINIC | Age: 60
End: 2025-06-06
Payer: COMMERCIAL

## 2025-06-06 ENCOUNTER — RESULTS FOLLOW-UP (OUTPATIENT)
Dept: INTERNAL MEDICINE | Facility: CLINIC | Age: 60
End: 2025-06-06

## 2025-06-06 DIAGNOSIS — Z00.00 ANNUAL PHYSICAL EXAM: ICD-10-CM

## 2025-06-06 DIAGNOSIS — Z01.84 IMMUNITY STATUS TESTING: ICD-10-CM

## 2025-06-06 LAB
ALBUMIN SERPL-MCNC: 4.3 G/DL (ref 3.5–5.2)
ALBUMIN/GLOB SERPL: 1.8 G/DL
ALP SERPL-CCNC: 106 U/L (ref 39–117)
ALT SERPL W P-5'-P-CCNC: 16 U/L (ref 1–33)
ANION GAP SERPL CALCULATED.3IONS-SCNC: 11 MMOL/L (ref 5–15)
AST SERPL-CCNC: 16 U/L (ref 1–32)
BASOPHILS # BLD AUTO: 0.05 10*3/MM3 (ref 0–0.2)
BASOPHILS NFR BLD AUTO: 1 % (ref 0–1.5)
BILIRUB SERPL-MCNC: 0.5 MG/DL (ref 0–1.2)
BUN SERPL-MCNC: 12 MG/DL (ref 8–23)
BUN/CREAT SERPL: 14.5 (ref 7–25)
CALCIUM SPEC-SCNC: 9.5 MG/DL (ref 8.6–10.5)
CHLORIDE SERPL-SCNC: 103 MMOL/L (ref 98–107)
CHOLEST SERPL-MCNC: 253 MG/DL (ref 0–200)
CO2 SERPL-SCNC: 27 MMOL/L (ref 22–29)
CREAT SERPL-MCNC: 0.83 MG/DL (ref 0.57–1)
DEPRECATED RDW RBC AUTO: 40.5 FL (ref 37–54)
EGFRCR SERPLBLD CKD-EPI 2021: 80.3 ML/MIN/1.73
EOSINOPHIL # BLD AUTO: 0.13 10*3/MM3 (ref 0–0.4)
EOSINOPHIL NFR BLD AUTO: 2.6 % (ref 0.3–6.2)
ERYTHROCYTE [DISTWIDTH] IN BLOOD BY AUTOMATED COUNT: 13.1 % (ref 12.3–15.4)
GLOBULIN UR ELPH-MCNC: 2.4 GM/DL
GLUCOSE SERPL-MCNC: 99 MG/DL (ref 65–99)
HCT VFR BLD AUTO: 41.7 % (ref 34–46.6)
HDLC SERPL-MCNC: 64 MG/DL (ref 40–60)
HGB BLD-MCNC: 14.1 G/DL (ref 12–15.9)
IMM GRANULOCYTES # BLD AUTO: 0.01 10*3/MM3 (ref 0–0.05)
IMM GRANULOCYTES NFR BLD AUTO: 0.2 % (ref 0–0.5)
LDLC SERPL CALC-MCNC: 146 MG/DL (ref 0–100)
LDLC/HDLC SERPL: 2.21 {RATIO}
LYMPHOCYTES # BLD AUTO: 1.1 10*3/MM3 (ref 0.7–3.1)
LYMPHOCYTES NFR BLD AUTO: 22.1 % (ref 19.6–45.3)
MCH RBC QN AUTO: 29.2 PG (ref 26.6–33)
MCHC RBC AUTO-ENTMCNC: 33.8 G/DL (ref 31.5–35.7)
MCV RBC AUTO: 86.3 FL (ref 79–97)
MONOCYTES # BLD AUTO: 0.3 10*3/MM3 (ref 0.1–0.9)
MONOCYTES NFR BLD AUTO: 6 % (ref 5–12)
NEUTROPHILS NFR BLD AUTO: 3.39 10*3/MM3 (ref 1.7–7)
NEUTROPHILS NFR BLD AUTO: 68.1 % (ref 42.7–76)
NRBC BLD AUTO-RTO: 0 /100 WBC (ref 0–0.2)
PLATELET # BLD AUTO: 178 10*3/MM3 (ref 140–450)
PMV BLD AUTO: 10.6 FL (ref 6–12)
POTASSIUM SERPL-SCNC: 4.6 MMOL/L (ref 3.5–5.2)
PROT SERPL-MCNC: 6.7 G/DL (ref 6–8.5)
RBC # BLD AUTO: 4.83 10*6/MM3 (ref 3.77–5.28)
SODIUM SERPL-SCNC: 141 MMOL/L (ref 136–145)
T4 FREE SERPL-MCNC: 1.62 NG/DL (ref 0.92–1.68)
TRIGL SERPL-MCNC: 237 MG/DL (ref 0–150)
TSH SERPL DL<=0.05 MIU/L-ACNC: 0.06 UIU/ML (ref 0.27–4.2)
VLDLC SERPL-MCNC: 43 MG/DL (ref 5–40)
WBC NRBC COR # BLD AUTO: 4.98 10*3/MM3 (ref 3.4–10.8)

## 2025-06-06 PROCEDURE — 84439 ASSAY OF FREE THYROXINE: CPT | Performed by: FAMILY MEDICINE

## 2025-06-06 PROCEDURE — 86762 RUBELLA ANTIBODY: CPT | Performed by: FAMILY MEDICINE

## 2025-06-06 PROCEDURE — 86765 RUBEOLA ANTIBODY: CPT | Performed by: FAMILY MEDICINE

## 2025-06-06 PROCEDURE — 80050 GENERAL HEALTH PANEL: CPT | Performed by: FAMILY MEDICINE

## 2025-06-06 PROCEDURE — 86735 MUMPS ANTIBODY: CPT | Performed by: FAMILY MEDICINE

## 2025-06-06 PROCEDURE — 80061 LIPID PANEL: CPT | Performed by: FAMILY MEDICINE

## 2025-06-07 LAB
MEV IGG SER IA-ACNC: >300 AU/ML
MUV IGG SER IA-ACNC: 201 AU/ML
RUBV IGG SERPL IA-ACNC: 6.67 INDEX

## (undated) DEVICE — GLV SURG DERMASSURE GRN LF PF SZ 6.5

## (undated) DEVICE — SHEET, DRAPE, SPLIT, STERILE: Brand: MEDLINE

## (undated) DEVICE — [HIGH FLOW INSUFFLATOR,  DO NOT USE IF PACKAGE IS DAMAGED,  KEEP DRY,  KEEP AWAY FROM SUNLIGHT,  PROTECT FROM HEAT AND RADIOACTIVE SOURCES.]: Brand: PNEUMOSURE

## (undated) DEVICE — SUT MNCRYL PLS ANTIB UD 3/0 PS2 27IN

## (undated) DEVICE — PK MAJ GYN DAVINCI 10

## (undated) DEVICE — PAD ARMBRD SURG CONVOL 7.5X20X2IN

## (undated) DEVICE — SKIN AFFIX SURG ADHESIVE 72/CS 0.55ML: Brand: MEDLINE

## (undated) DEVICE — GLV SURG SENSICARE MICRO PF LF 6 STRL

## (undated) DEVICE — APPL CHLORAPREP W/TINT 26ML BLU

## (undated) DEVICE — MANIP UTER RUMI TP 6.7MMX8CM BLU

## (undated) DEVICE — ENDOPATH XCEL BLADELESS TROCARS WITH STABILITY SLEEVES: Brand: ENDOPATH XCEL

## (undated) DEVICE — ANTIBACTERIAL UNDYED BRAIDED (POLYGLACTIN 910), SYNTHETIC ABSORBABLE SURGICAL SUTURE: Brand: COATED VICRYL

## (undated) DEVICE — INTENDED FOR TISSUE SEPARATION, AND OTHER PROCEDURES THAT REQUIRE A SHARP SURGICAL BLADE TO PUNCTURE OR CUT.: Brand: BARD-PARKER ® STAINLESS STEEL BLADES

## (undated) DEVICE — OBT BLADLES ENDOWRIST DAVINCI/S 8MM

## (undated) DEVICE — BOWL UTIL STRL 32OZ

## (undated) DEVICE — ANTIBACTERIAL UNDYED BRAIDED (POLYGLACTIN 910), SYNTHETIC ABSORBABLE SUTURE: Brand: COATED VICRYL

## (undated) DEVICE — Device

## (undated) DEVICE — SAFESECURE,SECUREMENT,FOLEY CATH,STERILE: Brand: MEDLINE

## (undated) DEVICE — FLTR PLUMEPORT LAP W/CONN STRL

## (undated) DEVICE — SUCTION CANISTER, 2500CC, RIGID: Brand: DEROYAL

## (undated) DEVICE — DRP ADAPT ALLY UTER POSTN SYS 1P/U

## (undated) DEVICE — CONTAINER,SPECIMEN,OR STERILE,4OZ: Brand: MEDLINE

## (undated) DEVICE — COVER,LIGHT HANDLE,FLX,1/PK: Brand: MEDLINE INDUSTRIES, INC.

## (undated) DEVICE — TIP COVER ACCESSORY

## (undated) DEVICE — MANIP UTER RUMI 2 KOH EFFICIENT 3CM BL

## (undated) DEVICE — SUT SILK 2/0 SH 30IN K833H